# Patient Record
Sex: MALE | Race: WHITE
[De-identification: names, ages, dates, MRNs, and addresses within clinical notes are randomized per-mention and may not be internally consistent; named-entity substitution may affect disease eponyms.]

---

## 2017-08-15 ENCOUNTER — HOSPITAL ENCOUNTER (OUTPATIENT)
Dept: HOSPITAL 83 - MRI | Age: 57
Discharge: HOME | End: 2017-08-15
Attending: INTERNAL MEDICINE
Payer: MEDICARE
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
## 2017-08-15 DIAGNOSIS — S99.911A: Primary | ICD-10-CM
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
## 2018-05-10 ENCOUNTER — HOSPITAL ENCOUNTER (OUTPATIENT)
Dept: HOSPITAL 83 - CT | Age: 58
Discharge: HOME | End: 2018-05-10
Attending: INTERNAL MEDICINE
Payer: MEDICARE

## 2018-05-10 DIAGNOSIS — R10.9: Primary | ICD-10-CM

## 2018-05-13 ENCOUNTER — APPOINTMENT (OUTPATIENT)
Dept: GENERAL RADIOLOGY | Age: 58
End: 2018-05-13
Payer: MEDICARE

## 2018-05-13 ENCOUNTER — HOSPITAL ENCOUNTER (EMERGENCY)
Age: 58
Discharge: HOME | End: 2018-05-13
Attending: EMERGENCY MEDICINE
Payer: MEDICARE

## 2018-05-13 VITALS
BODY MASS INDEX: 29.52 KG/M2 | DIASTOLIC BLOOD PRESSURE: 70 MMHG | WEIGHT: 250 LBS | TEMPERATURE: 98.3 F | HEART RATE: 76 BPM | OXYGEN SATURATION: 96 % | RESPIRATION RATE: 16 BRPM | SYSTOLIC BLOOD PRESSURE: 105 MMHG | HEIGHT: 77 IN

## 2018-05-13 DIAGNOSIS — R52 BODY ACHES: Primary | ICD-10-CM

## 2018-05-13 LAB
ANION GAP SERPL CALCULATED.3IONS-SCNC: 13 MMOL/L (ref 7–16)
BASOPHILS ABSOLUTE: 0.02 E9/L (ref 0–0.2)
BASOPHILS RELATIVE PERCENT: 0.3 % (ref 0–2)
BUN BLDV-MCNC: 10 MG/DL (ref 6–20)
CALCIUM SERPL-MCNC: 9.1 MG/DL (ref 8.6–10.2)
CHLORIDE BLD-SCNC: 106 MMOL/L (ref 98–107)
CO2: 22 MMOL/L (ref 22–29)
CREAT SERPL-MCNC: 0.9 MG/DL (ref 0.7–1.2)
EKG ATRIAL RATE: 72 BPM
EKG P AXIS: 24 DEGREES
EKG P-R INTERVAL: 150 MS
EKG Q-T INTERVAL: 402 MS
EKG QRS DURATION: 82 MS
EKG QTC CALCULATION (BAZETT): 440 MS
EKG R AXIS: 20 DEGREES
EKG T AXIS: 23 DEGREES
EKG VENTRICULAR RATE: 72 BPM
EOSINOPHILS ABSOLUTE: 0.2 E9/L (ref 0.05–0.5)
EOSINOPHILS RELATIVE PERCENT: 2.8 % (ref 0–6)
GFR AFRICAN AMERICAN: >60
GFR NON-AFRICAN AMERICAN: >60 ML/MIN/1.73
GLUCOSE BLD-MCNC: 107 MG/DL (ref 74–109)
HCT VFR BLD CALC: 42.2 % (ref 37–54)
HEMOGLOBIN: 14.2 G/DL (ref 12.5–16.5)
IMMATURE GRANULOCYTES #: 0.02 E9/L
IMMATURE GRANULOCYTES %: 0.3 % (ref 0–5)
INFLUENZA A BY PCR: NOT DETECTED
INFLUENZA B BY PCR: NOT DETECTED
LYMPHOCYTES ABSOLUTE: 2.56 E9/L (ref 1.5–4)
LYMPHOCYTES RELATIVE PERCENT: 35.5 % (ref 20–42)
MCH RBC QN AUTO: 31.1 PG (ref 26–35)
MCHC RBC AUTO-ENTMCNC: 33.6 % (ref 32–34.5)
MCV RBC AUTO: 92.3 FL (ref 80–99.9)
MONOCYTES ABSOLUTE: 0.52 E9/L (ref 0.1–0.95)
MONOCYTES RELATIVE PERCENT: 7.2 % (ref 2–12)
NEUTROPHILS ABSOLUTE: 3.9 E9/L (ref 1.8–7.3)
NEUTROPHILS RELATIVE PERCENT: 53.9 % (ref 43–80)
PDW BLD-RTO: 12.2 FL (ref 11.5–15)
PLATELET # BLD: 184 E9/L (ref 130–450)
PMV BLD AUTO: 10.7 FL (ref 7–12)
POTASSIUM SERPL-SCNC: 4 MMOL/L (ref 3.5–5)
RBC # BLD: 4.57 E12/L (ref 3.8–5.8)
SODIUM BLD-SCNC: 141 MMOL/L (ref 132–146)
TROPONIN: <0.01 NG/ML (ref 0–0.03)
WBC # BLD: 7.2 E9/L (ref 4.5–11.5)

## 2018-05-13 PROCEDURE — 87502 INFLUENZA DNA AMP PROBE: CPT

## 2018-05-13 PROCEDURE — 80048 BASIC METABOLIC PNL TOTAL CA: CPT

## 2018-05-13 PROCEDURE — 85025 COMPLETE CBC W/AUTO DIFF WBC: CPT

## 2018-05-13 PROCEDURE — 6370000000 HC RX 637 (ALT 250 FOR IP): Performed by: EMERGENCY MEDICINE

## 2018-05-13 PROCEDURE — 99285 EMERGENCY DEPT VISIT HI MDM: CPT

## 2018-05-13 PROCEDURE — 84484 ASSAY OF TROPONIN QUANT: CPT

## 2018-05-13 PROCEDURE — 71045 X-RAY EXAM CHEST 1 VIEW: CPT

## 2018-05-13 RX ORDER — HYDROCODONE BITARTRATE AND ACETAMINOPHEN 5; 325 MG/1; MG/1
1 TABLET ORAL DAILY PRN
COMMUNITY
End: 2021-01-11

## 2018-05-13 RX ORDER — PHENOBARBITAL 60 MG/1
60 TABLET ORAL 3 TIMES DAILY
COMMUNITY
End: 2022-06-15 | Stop reason: SDUPTHER

## 2018-05-13 RX ORDER — M-VIT,TX,IRON,MINS/CALC/FOLIC 27MG-0.4MG
1 TABLET ORAL DAILY
COMMUNITY

## 2018-05-13 RX ORDER — CYCLOSPORINE 0.5 MG/ML
1 EMULSION OPHTHALMIC EVERY 12 HOURS
COMMUNITY
End: 2022-10-20

## 2018-05-13 RX ORDER — CYANOCOBALAMIN 1000 UG/ML
1000 INJECTION INTRAMUSCULAR; SUBCUTANEOUS
COMMUNITY

## 2018-05-13 RX ORDER — SIMVASTATIN 10 MG
10 TABLET ORAL NIGHTLY
COMMUNITY
End: 2022-02-10

## 2018-05-13 RX ORDER — LEVETIRACETAM 500 MG/1
1000 TABLET ORAL 2 TIMES DAILY
COMMUNITY
End: 2021-01-11

## 2018-05-13 RX ORDER — MIRTAZAPINE 15 MG/1
15 TABLET, FILM COATED ORAL NIGHTLY
COMMUNITY
End: 2021-01-11

## 2018-05-13 RX ORDER — CLONAZEPAM 0.5 MG/1
0.5 TABLET ORAL 2 TIMES DAILY
COMMUNITY
End: 2022-02-10 | Stop reason: SDUPTHER

## 2018-05-13 RX ORDER — ACETAMINOPHEN 325 MG/1
650 TABLET ORAL ONCE
Status: COMPLETED | OUTPATIENT
Start: 2018-05-13 | End: 2018-05-13

## 2018-05-13 RX ORDER — VITAMIN E 268 MG
400 CAPSULE ORAL DAILY
COMMUNITY

## 2018-05-13 RX ORDER — ERGOCALCIFEROL 1.25 MG/1
50000 CAPSULE ORAL WEEKLY
COMMUNITY

## 2018-05-13 RX ORDER — RIZATRIPTAN BENZOATE 10 MG/1
10 TABLET ORAL
COMMUNITY

## 2018-05-13 RX ORDER — ESOMEPRAZOLE MAGNESIUM 40 MG/1
40 CAPSULE, DELAYED RELEASE ORAL
Status: ON HOLD | COMMUNITY
End: 2020-02-17 | Stop reason: HOSPADM

## 2018-05-13 RX ORDER — TOPIRAMATE 100 MG/1
100 TABLET, FILM COATED ORAL 2 TIMES DAILY
COMMUNITY
End: 2022-05-06

## 2018-05-13 RX ORDER — FLUTICASONE PROPIONATE 50 MCG
1 SPRAY, SUSPENSION (ML) NASAL DAILY
COMMUNITY

## 2018-05-13 RX ADMIN — ACETAMINOPHEN 650 MG: 325 TABLET ORAL at 02:12

## 2018-05-13 ASSESSMENT — ENCOUNTER SYMPTOMS
COUGH: 0
BACK PAIN: 1
SHORTNESS OF BREATH: 0
COLOR CHANGE: 0
SORE THROAT: 0
BLOOD IN STOOL: 0
RHINORRHEA: 0
NAUSEA: 0
CONSTIPATION: 0
DIARRHEA: 0
ABDOMINAL PAIN: 0
VOMITING: 0

## 2018-05-13 ASSESSMENT — PAIN SCALES - GENERAL
PAINLEVEL_OUTOF10: 6
PAINLEVEL_OUTOF10: 6

## 2018-05-13 ASSESSMENT — PAIN DESCRIPTION - LOCATION: LOCATION: GENERALIZED

## 2018-05-13 ASSESSMENT — PAIN DESCRIPTION - PAIN TYPE: TYPE: ACUTE PAIN

## 2018-05-24 ENCOUNTER — HOSPITAL ENCOUNTER (OUTPATIENT)
Dept: HOSPITAL 83 - MRI | Age: 58
Discharge: HOME | End: 2018-05-24
Attending: INTERNAL MEDICINE
Payer: MEDICARE

## 2018-05-24 DIAGNOSIS — G93.89: Primary | ICD-10-CM

## 2018-07-25 ENCOUNTER — HOSPITAL ENCOUNTER (OUTPATIENT)
Dept: HOSPITAL 83 - SDC | Age: 58
Discharge: HOME | End: 2018-07-25
Attending: INTERNAL MEDICINE
Payer: MEDICARE

## 2018-07-25 VITALS — SYSTOLIC BLOOD PRESSURE: 109 MMHG | DIASTOLIC BLOOD PRESSURE: 74 MMHG

## 2018-07-25 VITALS — SYSTOLIC BLOOD PRESSURE: 100 MMHG | DIASTOLIC BLOOD PRESSURE: 70 MMHG

## 2018-07-25 VITALS — DIASTOLIC BLOOD PRESSURE: 50 MMHG

## 2018-07-25 VITALS — BODY MASS INDEX: 29.16 KG/M2 | WEIGHT: 220 LBS | HEIGHT: 72.99 IN

## 2018-07-25 VITALS — DIASTOLIC BLOOD PRESSURE: 44 MMHG

## 2018-07-25 VITALS — DIASTOLIC BLOOD PRESSURE: 54 MMHG | SYSTOLIC BLOOD PRESSURE: 89 MMHG

## 2018-07-25 DIAGNOSIS — Z90.49: ICD-10-CM

## 2018-07-25 DIAGNOSIS — K22.10: ICD-10-CM

## 2018-07-25 DIAGNOSIS — K44.9: ICD-10-CM

## 2018-07-25 DIAGNOSIS — Z88.0: ICD-10-CM

## 2018-07-25 DIAGNOSIS — Z88.8: ICD-10-CM

## 2018-07-25 DIAGNOSIS — Z82.49: ICD-10-CM

## 2018-07-25 DIAGNOSIS — Z87.820: ICD-10-CM

## 2018-07-25 DIAGNOSIS — K29.50: Primary | ICD-10-CM

## 2018-07-25 DIAGNOSIS — K21.9: ICD-10-CM

## 2018-07-25 DIAGNOSIS — F17.210: ICD-10-CM

## 2018-07-25 DIAGNOSIS — Z98.890: ICD-10-CM

## 2018-07-25 DIAGNOSIS — Z80.9: ICD-10-CM

## 2020-02-16 ENCOUNTER — APPOINTMENT (OUTPATIENT)
Dept: GENERAL RADIOLOGY | Age: 60
End: 2020-02-16
Payer: MEDICARE

## 2020-02-16 ENCOUNTER — HOSPITAL ENCOUNTER (OUTPATIENT)
Age: 60
Setting detail: OBSERVATION
Discharge: OTHER INSTITUTION | End: 2020-02-17
Attending: EMERGENCY MEDICINE | Admitting: INTERNAL MEDICINE
Payer: MEDICARE

## 2020-02-16 LAB
ALBUMIN SERPL-MCNC: 4.3 G/DL (ref 3.5–5.2)
ALP BLD-CCNC: 86 U/L (ref 40–129)
ALT SERPL-CCNC: 23 U/L (ref 0–40)
ANION GAP SERPL CALCULATED.3IONS-SCNC: 10 MMOL/L (ref 7–16)
AST SERPL-CCNC: 38 U/L (ref 0–39)
BASOPHILS ABSOLUTE: 0.03 E9/L (ref 0–0.2)
BASOPHILS RELATIVE PERCENT: 0.4 % (ref 0–2)
BILIRUB SERPL-MCNC: <0.2 MG/DL (ref 0–1.2)
BUN BLDV-MCNC: 14 MG/DL (ref 6–20)
CALCIUM SERPL-MCNC: 9.1 MG/DL (ref 8.6–10.2)
CHLORIDE BLD-SCNC: 101 MMOL/L (ref 98–107)
CO2: 24 MMOL/L (ref 22–29)
CREAT SERPL-MCNC: 1 MG/DL (ref 0.7–1.2)
EOSINOPHILS ABSOLUTE: 0.06 E9/L (ref 0.05–0.5)
EOSINOPHILS RELATIVE PERCENT: 0.8 % (ref 0–6)
GFR AFRICAN AMERICAN: >60
GFR NON-AFRICAN AMERICAN: >60 ML/MIN/1.73
GLUCOSE BLD-MCNC: 109 MG/DL (ref 74–99)
HCT VFR BLD CALC: 44.3 % (ref 37–54)
HEMOGLOBIN: 14.7 G/DL (ref 12.5–16.5)
IMMATURE GRANULOCYTES #: 0.03 E9/L
IMMATURE GRANULOCYTES %: 0.4 % (ref 0–5)
INFLUENZA A BY PCR: DETECTED
INFLUENZA B BY PCR: NOT DETECTED
LACTIC ACID, SEPSIS: 0.9 MMOL/L (ref 0.5–1.9)
LYMPHOCYTES ABSOLUTE: 1.2 E9/L (ref 1.5–4)
LYMPHOCYTES RELATIVE PERCENT: 15.6 % (ref 20–42)
MCH RBC QN AUTO: 31.3 PG (ref 26–35)
MCHC RBC AUTO-ENTMCNC: 33.2 % (ref 32–34.5)
MCV RBC AUTO: 94.5 FL (ref 80–99.9)
MONOCYTES ABSOLUTE: 0.82 E9/L (ref 0.1–0.95)
MONOCYTES RELATIVE PERCENT: 10.7 % (ref 2–12)
NEUTROPHILS ABSOLUTE: 5.54 E9/L (ref 1.8–7.3)
NEUTROPHILS RELATIVE PERCENT: 72.1 % (ref 43–80)
PDW BLD-RTO: 12.8 FL (ref 11.5–15)
PLATELET # BLD: 154 E9/L (ref 130–450)
PMV BLD AUTO: 10.5 FL (ref 7–12)
POTASSIUM REFLEX MAGNESIUM: 4.3 MMOL/L (ref 3.5–5)
PRO-BNP: 40 PG/ML (ref 0–125)
RBC # BLD: 4.69 E12/L (ref 3.8–5.8)
SODIUM BLD-SCNC: 135 MMOL/L (ref 132–146)
TOTAL PROTEIN: 6.8 G/DL (ref 6.4–8.3)
TROPONIN: <0.01 NG/ML (ref 0–0.03)
WBC # BLD: 7.7 E9/L (ref 4.5–11.5)

## 2020-02-16 PROCEDURE — 71046 X-RAY EXAM CHEST 2 VIEWS: CPT

## 2020-02-16 PROCEDURE — 87040 BLOOD CULTURE FOR BACTERIA: CPT

## 2020-02-16 PROCEDURE — 84484 ASSAY OF TROPONIN QUANT: CPT

## 2020-02-16 PROCEDURE — 85025 COMPLETE CBC W/AUTO DIFF WBC: CPT

## 2020-02-16 PROCEDURE — 83880 ASSAY OF NATRIURETIC PEPTIDE: CPT

## 2020-02-16 PROCEDURE — 93005 ELECTROCARDIOGRAM TRACING: CPT | Performed by: STUDENT IN AN ORGANIZED HEALTH CARE EDUCATION/TRAINING PROGRAM

## 2020-02-16 PROCEDURE — 36415 COLL VENOUS BLD VENIPUNCTURE: CPT

## 2020-02-16 PROCEDURE — 80053 COMPREHEN METABOLIC PANEL: CPT

## 2020-02-16 PROCEDURE — 83605 ASSAY OF LACTIC ACID: CPT

## 2020-02-16 PROCEDURE — 2580000003 HC RX 258: Performed by: STUDENT IN AN ORGANIZED HEALTH CARE EDUCATION/TRAINING PROGRAM

## 2020-02-16 PROCEDURE — 6370000000 HC RX 637 (ALT 250 FOR IP): Performed by: STUDENT IN AN ORGANIZED HEALTH CARE EDUCATION/TRAINING PROGRAM

## 2020-02-16 PROCEDURE — 99285 EMERGENCY DEPT VISIT HI MDM: CPT

## 2020-02-16 PROCEDURE — 87502 INFLUENZA DNA AMP PROBE: CPT

## 2020-02-16 RX ORDER — ACETAMINOPHEN 500 MG
1000 TABLET ORAL ONCE
Status: COMPLETED | OUTPATIENT
Start: 2020-02-16 | End: 2020-02-16

## 2020-02-16 RX ORDER — OMEPRAZOLE 20 MG/1
20 CAPSULE, DELAYED RELEASE ORAL DAILY
COMMUNITY

## 2020-02-16 RX ORDER — RIVASTIGMINE TARTRATE 6 MG/1
6 CAPSULE ORAL 2 TIMES DAILY
COMMUNITY

## 2020-02-16 RX ORDER — OSELTAMIVIR PHOSPHATE 75 MG/1
75 CAPSULE ORAL ONCE
Status: COMPLETED | OUTPATIENT
Start: 2020-02-16 | End: 2020-02-16

## 2020-02-16 RX ORDER — ONDANSETRON 4 MG/1
4 TABLET, FILM COATED ORAL EVERY 8 HOURS PRN
COMMUNITY

## 2020-02-16 RX ORDER — CALCIUM CARBONATE 200(500)MG
1 TABLET,CHEWABLE ORAL DAILY
COMMUNITY
End: 2022-04-13

## 2020-02-16 RX ORDER — METOCLOPRAMIDE 5 MG/1
5 TABLET ORAL 4 TIMES DAILY
COMMUNITY

## 2020-02-16 RX ORDER — 0.9 % SODIUM CHLORIDE 0.9 %
30 INTRAVENOUS SOLUTION INTRAVENOUS ONCE
Status: COMPLETED | OUTPATIENT
Start: 2020-02-16 | End: 2020-02-17

## 2020-02-16 RX ORDER — MEMANTINE HYDROCHLORIDE 10 MG/1
10 TABLET ORAL 2 TIMES DAILY
COMMUNITY

## 2020-02-16 RX ADMIN — OSELTAMIVIR PHOSPHATE 75 MG: 75 CAPSULE ORAL at 23:21

## 2020-02-16 RX ADMIN — ACETAMINOPHEN 1000 MG: 500 TABLET ORAL at 22:12

## 2020-02-16 RX ADMIN — SODIUM CHLORIDE 3156 ML: 9 INJECTION, SOLUTION INTRAVENOUS at 22:12

## 2020-02-16 ASSESSMENT — PAIN SCALES - GENERAL
PAINLEVEL_OUTOF10: 10
PAINLEVEL_OUTOF10: 0

## 2020-02-16 ASSESSMENT — PAIN DESCRIPTION - LOCATION: LOCATION: RIB CAGE

## 2020-02-16 ASSESSMENT — PAIN DESCRIPTION - DESCRIPTORS: DESCRIPTORS: SORE

## 2020-02-16 ASSESSMENT — PAIN DESCRIPTION - ORIENTATION: ORIENTATION: LEFT;RIGHT

## 2020-02-16 ASSESSMENT — PAIN DESCRIPTION - PAIN TYPE: TYPE: ACUTE PAIN

## 2020-02-17 VITALS
OXYGEN SATURATION: 96 % | HEART RATE: 92 BPM | RESPIRATION RATE: 18 BRPM | BODY MASS INDEX: 27.39 KG/M2 | WEIGHT: 232 LBS | HEIGHT: 77 IN | TEMPERATURE: 100.3 F | DIASTOLIC BLOOD PRESSURE: 63 MMHG | SYSTOLIC BLOOD PRESSURE: 107 MMHG

## 2020-02-17 PROBLEM — J11.1 INFLUENZA: Status: ACTIVE | Noted: 2020-02-17

## 2020-02-17 PROBLEM — J10.1 INFLUENZA A: Status: ACTIVE | Noted: 2020-02-17

## 2020-02-17 LAB
ADENOVIRUS BY PCR: NOT DETECTED
BACTERIA: NORMAL /HPF
BILIRUBIN URINE: NEGATIVE
BLOOD, URINE: NEGATIVE
BORDETELLA PARAPERTUSSIS BY PCR: NOT DETECTED
BORDETELLA PERTUSSIS BY PCR: NOT DETECTED
CHLAMYDOPHILIA PNEUMONIAE BY PCR: NOT DETECTED
CLARITY: CLEAR
COLOR: YELLOW
CORONAVIRUS 229E BY PCR: NOT DETECTED
CORONAVIRUS HKU1 BY PCR: NOT DETECTED
CORONAVIRUS NL63 BY PCR: NOT DETECTED
CORONAVIRUS OC43 BY PCR: NOT DETECTED
EKG ATRIAL RATE: 103 BPM
EKG P AXIS: 33 DEGREES
EKG P-R INTERVAL: 132 MS
EKG Q-T INTERVAL: 318 MS
EKG QRS DURATION: 82 MS
EKG QTC CALCULATION (BAZETT): 416 MS
EKG R AXIS: 54 DEGREES
EKG T AXIS: 39 DEGREES
EKG VENTRICULAR RATE: 103 BPM
GLUCOSE URINE: NEGATIVE MG/DL
HUMAN METAPNEUMOVIRUS BY PCR: NOT DETECTED
HUMAN RHINOVIRUS/ENTEROVIRUS BY PCR: NOT DETECTED
INFLUENZA A H1-2009 BY PCR: DETECTED
INFLUENZA B BY PCR: NOT DETECTED
KETONES, URINE: NEGATIVE MG/DL
LEUKOCYTE ESTERASE, URINE: NEGATIVE
MYCOPLASMA PNEUMONIAE BY PCR: NOT DETECTED
NITRITE, URINE: NEGATIVE
PARAINFLUENZA VIRUS 1 BY PCR: NOT DETECTED
PARAINFLUENZA VIRUS 2 BY PCR: NOT DETECTED
PARAINFLUENZA VIRUS 3 BY PCR: NOT DETECTED
PARAINFLUENZA VIRUS 4 BY PCR: NOT DETECTED
PH UA: 6 (ref 5–9)
PROCALCITONIN: 0.05 NG/ML (ref 0–0.08)
PROTEIN UA: NEGATIVE MG/DL
RBC UA: NORMAL /HPF (ref 0–2)
RESPIRATORY SYNCYTIAL VIRUS BY PCR: NOT DETECTED
SPECIFIC GRAVITY UA: 1.02 (ref 1–1.03)
UROBILINOGEN, URINE: 0.2 E.U./DL
WBC UA: NORMAL /HPF (ref 0–5)

## 2020-02-17 PROCEDURE — 0100U HC RESPIRPTHGN MULT REV TRANS & AMP PRB TECH 21 TRGT: CPT

## 2020-02-17 PROCEDURE — 84145 PROCALCITONIN (PCT): CPT

## 2020-02-17 PROCEDURE — 36415 COLL VENOUS BLD VENIPUNCTURE: CPT

## 2020-02-17 PROCEDURE — G0378 HOSPITAL OBSERVATION PER HR: HCPCS

## 2020-02-17 PROCEDURE — 93010 ELECTROCARDIOGRAM REPORT: CPT | Performed by: INTERNAL MEDICINE

## 2020-02-17 PROCEDURE — 96372 THER/PROPH/DIAG INJ SC/IM: CPT

## 2020-02-17 PROCEDURE — 81001 URINALYSIS AUTO W/SCOPE: CPT

## 2020-02-17 PROCEDURE — 99217 PR OBSERVATION CARE DISCHARGE MANAGEMENT: CPT | Performed by: INTERNAL MEDICINE

## 2020-02-17 PROCEDURE — 87088 URINE BACTERIA CULTURE: CPT

## 2020-02-17 PROCEDURE — 99220 PR INITIAL OBSERVATION CARE/DAY 70 MINUTES: CPT | Performed by: INTERNAL MEDICINE

## 2020-02-17 PROCEDURE — 6370000000 HC RX 637 (ALT 250 FOR IP): Performed by: INTERNAL MEDICINE

## 2020-02-17 PROCEDURE — 6360000002 HC RX W HCPCS: Performed by: INTERNAL MEDICINE

## 2020-02-17 PROCEDURE — 2580000003 HC RX 258: Performed by: INTERNAL MEDICINE

## 2020-02-17 RX ORDER — SODIUM CHLORIDE 0.9 % (FLUSH) 0.9 %
10 SYRINGE (ML) INJECTION PRN
Status: DISCONTINUED | OUTPATIENT
Start: 2020-02-17 | End: 2020-02-17 | Stop reason: HOSPADM

## 2020-02-17 RX ORDER — PHENOBARBITAL 32.4 MG/1
60 TABLET ORAL 3 TIMES DAILY
Status: DISCONTINUED | OUTPATIENT
Start: 2020-02-17 | End: 2020-02-17 | Stop reason: HOSPADM

## 2020-02-17 RX ORDER — RIVASTIGMINE TARTRATE 3 MG/1
6 CAPSULE ORAL 2 TIMES DAILY
Status: DISCONTINUED | OUTPATIENT
Start: 2020-02-17 | End: 2020-02-17 | Stop reason: HOSPADM

## 2020-02-17 RX ORDER — CLONAZEPAM 0.5 MG/1
0.5 TABLET ORAL 2 TIMES DAILY
Status: DISCONTINUED | OUTPATIENT
Start: 2020-02-17 | End: 2020-02-17 | Stop reason: HOSPADM

## 2020-02-17 RX ORDER — LEVETIRACETAM 500 MG/1
1000 TABLET ORAL 2 TIMES DAILY
Status: DISCONTINUED | OUTPATIENT
Start: 2020-02-17 | End: 2020-02-17 | Stop reason: HOSPADM

## 2020-02-17 RX ORDER — MIRTAZAPINE 15 MG/1
15 TABLET, FILM COATED ORAL NIGHTLY
Status: DISCONTINUED | OUTPATIENT
Start: 2020-02-17 | End: 2020-02-17 | Stop reason: HOSPADM

## 2020-02-17 RX ORDER — OSELTAMIVIR PHOSPHATE 75 MG/1
75 CAPSULE ORAL 2 TIMES DAILY
Qty: 10 CAPSULE | Refills: 0 | Status: SHIPPED | OUTPATIENT
Start: 2020-02-17 | End: 2020-02-22

## 2020-02-17 RX ORDER — TOPIRAMATE 100 MG/1
100 TABLET, FILM COATED ORAL 2 TIMES DAILY
Status: DISCONTINUED | OUTPATIENT
Start: 2020-02-17 | End: 2020-02-17 | Stop reason: HOSPADM

## 2020-02-17 RX ORDER — OSELTAMIVIR PHOSPHATE 75 MG/1
75 CAPSULE ORAL 2 TIMES DAILY
Status: DISCONTINUED | OUTPATIENT
Start: 2020-02-17 | End: 2020-02-17 | Stop reason: HOSPADM

## 2020-02-17 RX ORDER — ONDANSETRON 2 MG/ML
4 INJECTION INTRAMUSCULAR; INTRAVENOUS EVERY 6 HOURS PRN
Status: DISCONTINUED | OUTPATIENT
Start: 2020-02-17 | End: 2020-02-17 | Stop reason: HOSPADM

## 2020-02-17 RX ORDER — MEMANTINE HYDROCHLORIDE 10 MG/1
10 TABLET ORAL 2 TIMES DAILY
Status: DISCONTINUED | OUTPATIENT
Start: 2020-02-17 | End: 2020-02-17 | Stop reason: HOSPADM

## 2020-02-17 RX ORDER — ACETAMINOPHEN 325 MG/1
650 TABLET ORAL EVERY 4 HOURS PRN
Status: DISCONTINUED | OUTPATIENT
Start: 2020-02-17 | End: 2020-02-17 | Stop reason: HOSPADM

## 2020-02-17 RX ORDER — SIMVASTATIN 10 MG
10 TABLET ORAL NIGHTLY
Status: DISCONTINUED | OUTPATIENT
Start: 2020-02-17 | End: 2020-02-17 | Stop reason: HOSPADM

## 2020-02-17 RX ORDER — SODIUM CHLORIDE 0.9 % (FLUSH) 0.9 %
10 SYRINGE (ML) INJECTION EVERY 12 HOURS SCHEDULED
Status: DISCONTINUED | OUTPATIENT
Start: 2020-02-17 | End: 2020-02-17 | Stop reason: HOSPADM

## 2020-02-17 RX ORDER — SODIUM CHLORIDE 9 MG/ML
INJECTION, SOLUTION INTRAVENOUS CONTINUOUS
Status: DISCONTINUED | OUTPATIENT
Start: 2020-02-17 | End: 2020-02-17 | Stop reason: HOSPADM

## 2020-02-17 RX ADMIN — TOPIRAMATE 100 MG: 100 TABLET, FILM COATED ORAL at 09:03

## 2020-02-17 RX ADMIN — PHENOBARBITAL 64.8 MG: 32.4 TABLET ORAL at 14:01

## 2020-02-17 RX ADMIN — PHENOBARBITAL 64.8 MG: 32.4 TABLET ORAL at 09:03

## 2020-02-17 RX ADMIN — ACETAMINOPHEN 650 MG: 325 TABLET ORAL at 14:05

## 2020-02-17 RX ADMIN — OSELTAMIVIR PHOSPHATE 75 MG: 75 CAPSULE ORAL at 11:03

## 2020-02-17 RX ADMIN — SODIUM CHLORIDE: 9 INJECTION, SOLUTION INTRAVENOUS at 03:54

## 2020-02-17 RX ADMIN — CLONAZEPAM 0.5 MG: 0.5 TABLET ORAL at 09:03

## 2020-02-17 RX ADMIN — ENOXAPARIN SODIUM 40 MG: 40 INJECTION SUBCUTANEOUS at 09:03

## 2020-02-17 RX ADMIN — LEVETIRACETAM 1000 MG: 500 TABLET ORAL at 09:03

## 2020-02-17 RX ADMIN — MEMANTINE HYDROCHLORIDE 10 MG: 10 TABLET, FILM COATED ORAL at 09:03

## 2020-02-17 RX ADMIN — RIVASTIGMINE TARTRATE 6 MG: 3 CAPSULE ORAL at 09:03

## 2020-02-17 ASSESSMENT — PAIN SCALES - WONG BAKER: WONGBAKER_NUMERICALRESPONSE: 2

## 2020-02-17 ASSESSMENT — PAIN SCALES - GENERAL
PAINLEVEL_OUTOF10: 0
PAINLEVEL_OUTOF10: 0

## 2020-02-17 NOTE — ED PROVIDER NOTES
Consulted with Dr. John Wei, hospitalist, who accepted the patient for admission. [KG]      ED Course User Index  [KG] Antonia Long, DO       --------------------------------------------- PAST HISTORY ---------------------------------------------  Past Medical History:  has a past medical history of Bipolar 1 disorder ([facility] Depression, GERD (gastroesophageal reflux disease), Hydrocephaly ([facility] and [facility] Past Surgical History:  has no past surgical history on file. Social History:  reports that he has been smoking cigarettes. He has been smoking about 0.50 packs per day. He has never used smokeless tobacco. He reports that he does not drink alcohol or use drugs. Family History: family history is not on file. The patients home medications have been reviewed. Allergies: Asa [aspirin];  Augmentin [amoxicillin-pot clavulanate]; and Pcn [penicillins]    -------------------------------------------------- RESULTS -------------------------------------------------    LABS:  Results for orders placed or performed during the hospital encounter of 02/16/20   Rapid influenza A/B antigens   Result Value Ref Range    Influenza A by PCR DETECTED (A) Not Detected    Influenza B by PCR Not Detected Not Detected   CBC auto differential   Result Value Ref Range    WBC 7.7 4.5 - 11.5 E9/L    RBC 4.69 3.80 - 5.80 E12/L    Hemoglobin 14.7 12.5 - 16.5 g/dL    Hematocrit 44.3 37.0 - 54.0 %    MCV 94.5 80.0 - 99.9 fL    MCH 31.3 26.0 - 35.0 pg    MCHC 33.2 32.0 - 34.5 %    RDW 12.8 11.5 - 15.0 fL    Platelets 207 040 - 968 E9/L    MPV 10.5 7.0 - 12.0 fL    Neutrophils % 72.1 43.0 - 80.0 %    Immature Granulocytes % 0.4 0.0 - 5.0 %    Lymphocytes % 15.6 (L) 20.0 - 42.0 %    Monocytes % 10.7 2.0 - 12.0 %    Eosinophils % 0.8 0.0 - 6.0 %    Basophils % 0.4 0.0 - 2.0 %    Neutrophils Absolute 5.54 1.80 - 7.30 E9/L    Immature Granulocytes # 0.03 E9/L    Lymphocytes Absolute 1.20 (L) 1.50 - 4.00 E9/L Monocytes Absolute 0.82 0.10 - 0.95 E9/L    Eosinophils Absolute 0.06 0.05 - 0.50 E9/L    Basophils Absolute 0.03 0.00 - 0.20 E9/L   Comprehensive Metabolic Panel w/ Reflex to MG   Result Value Ref Range    Sodium 135 132 - 146 mmol/L    Potassium reflex Magnesium 4.3 3.5 - 5.0 mmol/L    Chloride 101 98 - 107 mmol/L    CO2 24 22 - 29 mmol/L    Anion Gap 10 7 - 16 mmol/L    Glucose 109 (H) 74 - 99 mg/dL    BUN 14 6 - 20 mg/dL    CREATININE 1.0 0.7 - 1.2 mg/dL    GFR Non-African American >60 >=60 mL/min/1.73    GFR African American >60     Calcium 9.1 8.6 - 10.2 mg/dL    Total Protein 6.8 6.4 - 8.3 g/dL    Alb 4.3 3.5 - 5.2 g/dL    Total Bilirubin <0.2 0.0 - 1.2 mg/dL    Alkaline Phosphatase 86 40 - 129 U/L    ALT 23 0 - 40 U/L    AST 38 0 - 39 U/L   Lactate, Sepsis   Result Value Ref Range    Lactic Acid, Sepsis 0.9 0.5 - 1.9 mmol/L   Brain Natriuretic Peptide   Result Value Ref Range    Pro-BNP 40 0 - 125 pg/mL   Troponin   Result Value Ref Range    Troponin <0.01 0.00 - 0.03 ng/mL   Urinalysis with Microscopic   Result Value Ref Range    Color, UA Yellow Straw/Yellow    Clarity, UA Clear Clear    Glucose, Ur Negative Negative mg/dL    Bilirubin Urine Negative Negative    Ketones, Urine Negative Negative mg/dL    Specific Gravity, UA 1.020 1.005 - 1.030    Blood, Urine Negative Negative    pH, UA 6.0 5.0 - 9.0    Protein, UA Negative Negative mg/dL    Urobilinogen, Urine 0.2 <2.0 E.U./dL    Nitrite, Urine Negative Negative    Leukocyte Esterase, Urine Negative Negative    WBC, UA NONE 0 - 5 /HPF    RBC, UA NONE 0 - 2 /HPF    Bacteria, UA NONE SEEN None Seen /HPF   EKG 12 Lead   Result Value Ref Range    Ventricular Rate 103 BPM    Atrial Rate 103 BPM    P-R Interval 132 ms    QRS Duration 82 ms    Q-T Interval 318 ms    QTc Calculation (Bazett) 416 ms    P Axis 33 degrees    R Axis 54 degrees    T Axis 39 degrees       RADIOLOGY:  XR CHEST STANDARD (2 VW)   Final Result      No evidence of pneumonia or pleural effusion.      ------------------------- NURSING NOTES AND VITALS REVIEWED ---------------------------  Date / Time Roomed:  2/16/2020  9:09 PM  ED Bed Assignment:  8991/2404-J    The nursing notes within the ED encounter and vital signs as below have been reviewed. Patient Vitals for the past 24 hrs:   BP Temp Temp src Pulse Resp SpO2 Height Weight   02/17/20 0800 107/63 99 °F (37.2 °C) Oral 92 18 96 % -- --   02/17/20 0250 129/72 99.3 °F (37.4 °C) Oral 96 18 95 % -- --   02/17/20 0210 (!) 140/72 98.4 °F (36.9 °C) Axillary 92 16 94 % -- --   02/17/20 0131 -- 98.4 °F (36.9 °C) -- -- -- -- -- --   02/16/20 2253 132/77 -- -- 98 16 92 % -- --   02/16/20 2109 -- -- -- -- -- -- -- 232 lb (105.2 kg)   02/16/20 2107 106/74 103 °F (39.4 °C) Oral 113 14 92 % 6' 5\" (1.956 m) 250 lb (113.4 kg)       Oxygen Saturation Interpretation: Normal    ------------------------------------------ PROGRESS NOTES ------------------------------------------  Re-evaluation(s):Patients symptoms show no change  Repeat physical examination is not changed    Counseling:  I have spoken with the patient and discussed todays results, in addition to providing specific details for the plan of care and counseling regarding the diagnosis and prognosis. Their questions are answered at this time and they are agreeable with the plan of admission.    --------------------------------- ADDITIONAL PROVIDER NOTES ---------------------------------  Consultations Spoke with Dr. Kenneth Lopez. Discussed case. They will admit the patient. This patient's ED course included: a personal history and physicial examination, re-evaluation prior to disposition, multiple bedside re-evaluations, IV medications, cardiac monitoring, continuous pulse oximetry and complex medical decision making and emergency management    This patient has remained hemodynamically stable during their ED course. Diagnosis:  1. Influenza    2.  History of intellectual disability Disposition:  Patient's disposition: Admit to telemetry  Patient's condition is stable.          Roxie Triana DO  Resident  02/17/20 3673

## 2020-02-17 NOTE — H&P
Hospitalist Group   History and Physical      CHIEF COMPLAINT: Shortness of breath, cough    History of Present Illness:  64 y.o. male with a history of MRDD, bipolar, seizure disorder presents with shortness breath and cough. Patient lives at the group home and he was brought in by his nurse. No one in the room with the patient. Patient is poor historian given his MRDD. He is nonverbal and oriented to place and time. He does not know when his symptoms started. He is unaware of anyone sick in the group home. Patient said that he currently has rib pain due to coughing. He denies nausea or vomiting. He mentioned he had diarrhea last week. No urinary symptoms. REVIEW OF SYSTEMS:  no fevers, chills, has ribs pain, has sob, no n/v, ha, vision/hearing changes, wt changes, hot/cold flashes, other open skin lesions, had diarrhea last week with not anymore, no constipation, dysuria/hematuria unless noted in HPI. Complete ROS performed with the patient and is otherwise negative. PMH:  Past Medical History:   Diagnosis Date    [facility] ([facility]     Depression     GERD (gastroesophageal reflux disease)     Hydrocephaly (HCC)     [facility]        Surgical History:  History reviewed. No pertinent surgical history. Medications Prior to Admission:    Prior to Admission medications    Medication Sig Start Date End Date Taking?  Authorizing Provider   ondansetron (ZOFRAN) 4 MG tablet Take 4 mg by mouth every 8 hours as needed for Nausea or Vomiting   Yes Historical Provider, MD   calcium carbonate (TUMS) 500 MG chewable tablet Take 1 tablet by mouth daily   Yes Historical Provider, MD   memantine (NAMENDA) 10 MG tablet Take 10 mg by mouth 2 times daily   Yes Historical Provider, MD   metoclopramide (REGLAN) 5 MG tablet Take 5 mg by mouth 4 times daily   Yes Historical Provider, MD   omeprazole (PRILOSEC) 20 MG delayed release capsule Take 40 mg by mouth daily   Yes Historical Provider, MD rivastigmine (EXELON) 6 MG capsule Take 6 mg by mouth 2 times daily   Yes Historical Provider, MD   clonazePAM (KLONOPIN) 0.5 MG tablet Take 0.5 mg by mouth 2 times daily. .   Yes Historical Provider, MD   esomeprazole (NEXIUM) 40 MG delayed release capsule Take 40 mg by mouth every morning (before breakfast)   Yes Historical Provider, MD   fluticasone (FLONASE) 50 MCG/ACT nasal spray 1 spray by Nasal route daily   Yes Historical Provider, MD   levETIRAcetam (KEPPRA) 500 MG tablet Take 1,000 mg by mouth 2 times daily   Yes Historical Provider, MD   mirtazapine (REMERON) 15 MG tablet Take 15 mg by mouth nightly   Yes Historical Provider, MD   Multiple Vitamins-Minerals (THERAPEUTIC MULTIVITAMIN-MINERALS) tablet Take 1 tablet by mouth daily   Yes Historical Provider, MD   PHENobarbital (LUMINAL) 60 MG tablet Take 60 mg by mouth 3 times daily. .   Yes Historical Provider, MD   cycloSPORINE (RESTASIS) 0.05 % ophthalmic emulsion Place 1 drop into both eyes every 12 hours   Yes Historical Provider, MD   simvastatin (ZOCOR) 10 MG tablet Take 10 mg by mouth nightly   Yes Historical Provider, MD   topiramate (TOPAMAX) 100 MG tablet Take 100 mg by mouth 2 times daily   Yes Historical Provider, MD   vitamin D (ERGOCALCIFEROL) 16000 units CAPS capsule Take 50,000 Units by mouth once a week   Yes Historical Provider, MD   vitamin E 400 UNIT capsule Take 400 Units by mouth daily   Yes Historical Provider, MD   HYDROcodone-acetaminophen (NORCO) 5-325 MG per tablet Take 1 tablet by mouth daily as needed for Pain. Aayush Alfonso Historical Provider, MD   rizatriptan (MAXALT) 10 MG tablet Take 10 mg by mouth once as needed for Migraine May repeat in 2 hours if needed    Historical Provider, MD   cyanocobalamin 1000 MCG/ML injection Inject 1,000 mcg into the muscle every 30 days    Historical Provider, MD   Cariprazine HCl (VRAYLAR) 3 MG CAPS Take by mouth nightly    Historical Provider, MD       Allergies:    Tonja Montesinos [aspirin];  Augmentin [amoxicillin-pot clavulanate]; and Pcn [penicillins]    Social History:    reports that he has been smoking cigarettes. He has been smoking about 0.50 packs per day. He has never used smokeless tobacco. He reports that he does not drink alcohol or use drugs. Family History:   Patient is poor historian-unknown family history    PHYSICAL EXAM:  Vitals:  /72   Pulse 96   Temp 99.3 °F (37.4 °C) (Oral)   Resp 18   Ht 6' 5\" (1.956 m)   Wt 232 lb (105.2 kg)   SpO2 95%   BMI 27.51 kg/m²   General Appearance: Slow mentation, alert and oriented to person, place and time,  in no acute distress  Skin: warm and dry, no rash or erythema  Head: normocephalic and atraumatic  Eyes: pupils equal, round, and reactive to light, extraocular eye movements intact, conjunctivae normal  ENT: tympanic membrane, external ear and ear canal normal bilaterally, nose without deformity, nasal mucosa and turbinates normal without polyps  Neck: supple and non-tender without mass, no thyromegaly or thyroid nodules, no cervical lymphadenopathy  Pulmonary/Chest: clear to auscultation bilaterally- no wheezes  Cardiovascular: normal rate, regular rhythm, normal S1 and S2, no murmurs, rubs, clicks, or gallops, distal pulses intact, no carotid bruits  Abdomen: soft, non-tender, non-distended, normal bowel sounds, no masses or organomegaly  Extremities: no cyanosis, clubbing or edema  Musculoskeletal: normal range of motion, no joint swelling, deformity or tenderness  Neurologic: reflexes normal and symmetric, no cranial nerve deficit,coordination and speech normal      LABS:  Recent Labs     02/16/20  2139      K 4.3      CO2 24   BUN 14   CREATININE 1.0   GLUCOSE 109*   CALCIUM 9.1       Recent Labs     02/16/20 2139   WBC 7.7   RBC 4.69   HGB 14.7   HCT 44.3   MCV 94.5   MCH 31.3   MCHC 33.2   RDW 12.8      MPV 10.5       No results for input(s): POCGLU in the last 72 hours.     CBC with Differential:    Lab Results Component Value Date    WBC 7.7 2020    RBC 4.69 2020    HGB 14.7 2020    HCT 44.3 2020     2020    MCV 94.5 2020    MCH 31.3 2020    MCHC 33.2 2020    RDW 12.8 2020    LYMPHOPCT 15.6 2020    MONOPCT 10.7 2020    BASOPCT 0.4 2020    MONOSABS 0.82 2020    LYMPHSABS 1.20 2020    EOSABS 0.06 2020    BASOSABS 0.03 2020     CMP:    Lab Results   Component Value Date     2020    K 4.3 2020     2020    CO2 24 2020    BUN 14 2020    CREATININE 1.0 2020    GFRAA >60 2020    LABGLOM >60 2020    GLUCOSE 109 2020    PROT 6.8 2020    LABALBU 4.3 2020    CALCIUM 9.1 2020    BILITOT <0.2 2020    ALKPHOS 86 2020    AST 38 2020    ALT 23 2020       Radiology: Xr Chest Standard (2 Vw)    Result Date: 2020  Patient MRN:  [id number] : 1960 Age: 64 years Gender: Male Order Date:  2020 9:30 PM EXAM: XR CHEST (2 VW) COMPARISON: May 13, 2018 INDICATION:  chest pain chest pain FINDINGS: There are low lung volumes. No focal airspace opacity or pleural effusion. The heart is normal in size. Left-sided ventriculoperitoneal shunt catheter present. No pneumothorax. No evidence of pneumonia or pleural effusion. EKG: Sinus tachycardia    ASSESSMENT/ PLAN[de-identified]      Active Problems:    Influenza A  Resolved Problems:    * No resolved hospital problems. *      1. Influenza A   Lives at the group home- likely sick contact   Shortness of breath, cough-unknown duration   Influenza A positive on rapid flu test   Will continue Tamiflu   IV hydration  2.  SIRS   Tachycardia, fever on presentation   Diagnosed of influenza A   Chest x-ray negative   Will send procalcitonin to rule out bacterial pneumonia   Continue IV hydration and monitor    Code Status: Full  DVT prophylaxis: Lovenox    Electronically signed by Lisa Bunch,

## 2020-02-17 NOTE — DISCHARGE SUMMARY
94.5   MCH 31.3   MCHC 33.2   RDW 12.8      MPV 10.5       No results for input(s): POCGLU in the last 72 hours. Imaging:  Xr Chest Standard (2 Vw)    Result Date: 2020  Patient MRN:  [id number] : 1960 Age: 64 years Gender: Male Order Date:  2020 9:30 PM EXAM: XR CHEST (2 VW) COMPARISON: May 13, 2018 INDICATION:  chest pain chest pain FINDINGS: There are low lung volumes. No focal airspace opacity or pleural effusion. The heart is normal in size. Left-sided ventriculoperitoneal shunt catheter present. No pneumothorax. No evidence of pneumonia or pleural effusion.       Patient Instructions:      Medication List      START taking these medications    oseltamivir 75 MG capsule  Commonly known as:  TAMIFLU  Take 1 capsule by mouth 2 times daily for 5 days        CONTINUE taking these medications    calcium carbonate 500 MG chewable tablet  Commonly known as:  TUMS     clonazePAM 0.5 MG tablet  Commonly known as:  KLONOPIN     cyanocobalamin 1000 MCG/ML injection     fluticasone 50 MCG/ACT nasal spray  Commonly known as:  FLONASE     HYDROcodone-acetaminophen 5-325 MG per tablet  Commonly known as:  NORCO     Keppra 500 MG tablet  Generic drug:  levETIRAcetam     Maxalt 10 MG tablet  Generic drug:  rizatriptan     memantine 10 MG tablet  Commonly known as:  NAMENDA     metoclopramide 5 MG tablet  Commonly known as:  REGLAN     mirtazapine 15 MG tablet  Commonly known as:  REMERON     omeprazole 20 MG delayed release capsule  Commonly known as:  PRILOSEC     PHENobarbital 60 MG tablet  Commonly known as:  LUMINAL     Restasis 0.05 % ophthalmic emulsion  Generic drug:  cycloSPORINE     rivastigmine 6 MG capsule  Commonly known as:  EXELON     simvastatin 10 MG tablet  Commonly known as:  ZOCOR     therapeutic multivitamin-minerals tablet     topiramate 100 MG tablet  Commonly known as:  TOPAMAX     vitamin D 1.25 MG (81369 UT) Caps capsule  Commonly known as:  ERGOCALCIFEROL     vitamin E 400

## 2020-02-17 NOTE — PROGRESS NOTES
Spoke to Jose Gutiérrez at [facility] to let her know that the patient is being discharged. She stated that she will send someone in to evaluate the patient to see if he is ready to return to [facility]. I let her know that the Dr evaluated the patient and feels he is ready to return. Someone from [facility] will be in to see the patient.

## 2020-02-17 NOTE — PROGRESS NOTES
[facility]/Tele  [address]  [facility] [address]  Phone: [phone]             February 17, 2020    Patient: Victor Hugo Colon   YOB: 1960   Date of Visit: 2/16/2020       To Whom It May Concern:    Victor Hugo Colon was seen and treated in our facility  beginning 2/16/2020 until 2/17/2020. He may return to work on 2/24/2020 (1 week after discharge).       Sincerely,       Mauro Richards RN         Signature:__________________________________

## 2020-02-17 NOTE — DISCHARGE INSTR - COC
Independent  Transfer  Independent  Bathing  Independent  Dressing  Independent  Toileting  Assisted  Feeding  Independent  Med [address]  Assisted  Med Delivery   whole    Wound Care Documentation and Therapy:        Elimination:  Continence:   · Bowel: Yes  · Bladder: Yes  Urinary Catheter: None   Colostomy/Ileostomy/Ileal Conduit: No       Date of Last BM: 02/17/2020  No intake or output data in the 24 hours ending 02/17/20 1625  No intake/output data recorded. Safety Concerns: At Risk for Falls    Impairments/Disabilities:      None    Nutrition Therapy:  Current Nutrition Therapy:   - Oral Diet:  General    Routes of Feeding: Oral  Liquids: Thin Liquids  Daily Fluid Restriction: no  Last Modified Barium Swallow with Video (Video Swallowing Test): not done    Treatments at the Time of Hospital Discharge:   Respiratory Treatments: ***  Oxygen Therapy:  is not on home oxygen therapy.   Ventilator:    - No ventilator support    Rehab Therapies: Physical Therapy and Occupational Therapy  Weight Bearing Status/Restrictions: No weight bearing restirctions  Other Medical Equipment (for information only, NOT a DME order):  {EQUIPMENT:[id number]}  Other Treatments: ***    Patient's personal belongings (please select all that are sent with patient):  None    RN SIGNATURE:  {Esignature:[id number]} Cecilia Martinez RN     CASE MANAGEMENT/SOCIAL WORK SECTION    Inpatient Status Date: ***    Readmission Risk Assessment Score:  Readmission Risk              Risk of Unplanned Readmission:        12           Discharging to Facility/ Agency   · Name:   · Address:  · Phone:  · Fax:    Dialysis Facility (if applicable)   · Name:  · Address:  · Dialysis Schedule:  · Phone:  · Fax:    / signature: {Esignature:[id number]}    PHYSICIAN SECTION    Prognosis: {Prognosis:[id number]}    Condition at Discharge: [address] [place] [address] Patient Condition:[id number]}    Rehab Potential (if transferring to Rehab):

## 2020-02-19 LAB — URINE CULTURE, ROUTINE: NORMAL

## 2020-02-22 LAB
BLOOD CULTURE, ROUTINE: NORMAL
CULTURE, BLOOD 2: NORMAL

## 2020-08-06 ENCOUNTER — HOSPITAL ENCOUNTER (OUTPATIENT)
Dept: HOSPITAL 83 - LAB | Age: 60
Discharge: HOME | End: 2020-08-06
Attending: INTERNAL MEDICINE
Payer: MEDICARE

## 2020-08-06 DIAGNOSIS — R76.0: ICD-10-CM

## 2020-08-06 DIAGNOSIS — M06.4: ICD-10-CM

## 2020-08-06 DIAGNOSIS — Z12.5: Primary | ICD-10-CM

## 2020-08-06 DIAGNOSIS — E78.5: ICD-10-CM

## 2020-08-06 LAB
ALBUMIN SERPL-MCNC: 3.9 GM/DL (ref 3.1–4.5)
ALP SERPL-CCNC: 119 U/L (ref 45–117)
ALT SERPL W P-5'-P-CCNC: 27 U/L (ref 12–78)
AST SERPL-CCNC: 15 IU/L (ref 3–35)
BASOPHILS # BLD AUTO: 0 10*3/UL (ref 0–0.1)
BASOPHILS NFR BLD AUTO: 0.1 % (ref 0–1)
BUN SERPL-MCNC: 12 MG/DL (ref 7–24)
CHLORIDE SERPL-SCNC: 111 MMOL/L (ref 98–107)
CHOLEST SERPL-MCNC: 208 MG/DL (ref ?–200)
CREAT SERPL-MCNC: 0.96 MG/DL (ref 0.7–1.3)
EOSINOPHIL # BLD AUTO: 0.2 10*3/UL (ref 0–0.4)
EOSINOPHIL # BLD AUTO: 2.7 % (ref 1–4)
ERYTHROCYTE [DISTWIDTH] IN BLOOD BY AUTOMATED COUNT: 12 % (ref 0–14.5)
HCT VFR BLD AUTO: 45.8 % (ref 42–52)
HDLC SERPL-MCNC: 52 MG/DL (ref 40–60)
LDLC SERPL DIRECT ASSAY-MCNC: 113 MG/DL (ref 9–159)
LYMPHOCYTES # BLD AUTO: 2.6 10*3/UL (ref 1.3–4.4)
LYMPHOCYTES NFR BLD AUTO: 36.9 % (ref 27–41)
MCH RBC QN AUTO: 31.4 PG (ref 27–31)
MCHC RBC AUTO-ENTMCNC: 32.8 G/DL (ref 33–37)
MCV RBC AUTO: 95.8 FL (ref 80–94)
MONOCYTES # BLD AUTO: 0.5 10*3/UL (ref 0.1–1)
MONOCYTES NFR BLD MANUAL: 7.6 % (ref 3–9)
NEUT #: 3.7 10*3/UL (ref 2.3–7.9)
NEUT %: 52.6 % (ref 47–73)
NRBC BLD QL AUTO: 0 10*3/UL (ref 0–0)
PLATELET # BLD AUTO: 218 10*3/UL (ref 130–400)
PMV BLD AUTO: 10.9 FL (ref 9.6–12.3)
POTASSIUM SERPL-SCNC: 4.2 MMOL/L (ref 3.5–5.1)
PROT SERPL-MCNC: 7.6 GM/DL (ref 6.4–8.2)
RBC # BLD AUTO: 4.78 10*6/UL (ref 4.5–5.9)
SODIUM SERPL-SCNC: 140 MMOL/L (ref 136–145)
TRIGL SERPL-MCNC: 213 MG/DL (ref ?–150)
VLDLC SERPL CALC-MCNC: 43 MG/DL (ref 6–40)
WBC NRBC COR # BLD AUTO: 7 10*3/UL (ref 4.8–10.8)

## 2020-08-07 LAB
DSDNA AB SER-ACNC: 4 IU/ML (ref 0–9)
RHEUMATOID FACT SERPL-ACNC: <10 IU/ML (ref 0–13.9)

## 2020-08-08 LAB — CCP IGA+IGG SERPL IA-ACNC: 6 UNITS (ref 0–19)

## 2020-10-16 ENCOUNTER — HOSPITAL ENCOUNTER (OUTPATIENT)
Dept: HOSPITAL 83 - LAB | Age: 60
Discharge: HOME | End: 2020-10-16
Attending: INTERNAL MEDICINE
Payer: MEDICARE

## 2020-10-16 DIAGNOSIS — K76.0: Primary | ICD-10-CM

## 2020-10-16 DIAGNOSIS — K44.9: ICD-10-CM

## 2020-10-16 LAB — CREAT SERPL-MCNC: 1.03 MG/DL (ref 0.7–1.3)

## 2020-10-22 ENCOUNTER — HOSPITAL ENCOUNTER (OUTPATIENT)
Age: 60
Discharge: HOME | End: 2020-10-24
Payer: MEDICARE

## 2020-10-22 LAB — HBA1C MFR BLD: 5.5 % (ref 4–5.6)

## 2020-10-22 PROCEDURE — 83036 HEMOGLOBIN GLYCOSYLATED A1C: CPT

## 2020-10-22 PROCEDURE — 36415 COLL VENOUS BLD VENIPUNCTURE: CPT

## 2021-01-11 ENCOUNTER — OFFICE VISIT (OUTPATIENT)
Dept: PRIMARY CARE CLINIC | Age: 61
End: 2021-01-11
Payer: MEDICARE

## 2021-01-11 ENCOUNTER — APPOINTMENT (OUTPATIENT)
Dept: GENERAL RADIOLOGY | Age: 61
DRG: 871 | End: 2021-01-11
Payer: MEDICARE

## 2021-01-11 ENCOUNTER — APPOINTMENT (OUTPATIENT)
Dept: CT IMAGING | Age: 61
DRG: 871 | End: 2021-01-11
Payer: MEDICARE

## 2021-01-11 ENCOUNTER — HOSPITAL ENCOUNTER (INPATIENT)
Age: 61
LOS: 4 days | Discharge: HOME | DRG: 871 | End: 2021-01-15
Attending: EMERGENCY MEDICINE | Admitting: INTERNAL MEDICINE
Payer: MEDICARE

## 2021-01-11 VITALS
BODY MASS INDEX: 27.39 KG/M2 | OXYGEN SATURATION: 88 % | RESPIRATION RATE: 20 BRPM | HEIGHT: 77 IN | HEART RATE: 122 BPM | WEIGHT: 232 LBS | TEMPERATURE: 98.2 F

## 2021-01-11 DIAGNOSIS — Z20.822 SUSPECTED COVID-19 VIRUS INFECTION: ICD-10-CM

## 2021-01-11 DIAGNOSIS — J12.82 PNEUMONIA DUE TO COVID-19 VIRUS: Primary | ICD-10-CM

## 2021-01-11 DIAGNOSIS — J96.01 ACUTE RESPIRATORY FAILURE WITH HYPOXIA (HCC): ICD-10-CM

## 2021-01-11 DIAGNOSIS — U07.1 PNEUMONIA DUE TO COVID-19 VIRUS: Primary | ICD-10-CM

## 2021-01-11 DIAGNOSIS — J40 BRONCHITIS: Primary | ICD-10-CM

## 2021-01-11 DIAGNOSIS — R09.02 HYPOXIA: ICD-10-CM

## 2021-01-11 PROBLEM — J11.1 INFLUENZA: Status: RESOLVED | Noted: 2020-02-17 | Resolved: 2021-01-11

## 2021-01-11 LAB
ADENOVIRUS BY PCR: NOT DETECTED
ANION GAP SERPL CALCULATED.3IONS-SCNC: 11 MMOL/L (ref 7–16)
B.E.: -2.2 MMOL/L (ref -3–0)
BASOPHILS ABSOLUTE: 0.01 E9/L (ref 0–0.2)
BASOPHILS RELATIVE PERCENT: 0.1 % (ref 0–2)
BORDETELLA PARAPERTUSSIS BY PCR: NOT DETECTED
BORDETELLA PERTUSSIS BY PCR: NOT DETECTED
BUN BLDV-MCNC: 17 MG/DL (ref 6–20)
CALCIUM SERPL-MCNC: 8.3 MG/DL (ref 8.6–10.2)
CHLAMYDOPHILIA PNEUMONIAE BY PCR: NOT DETECTED
CHLORIDE BLD-SCNC: 103 MMOL/L (ref 98–107)
CO2: 23 MMOL/L (ref 22–29)
CORONAVIRUS 229E BY PCR: NOT DETECTED
CORONAVIRUS HKU1 BY PCR: NOT DETECTED
CORONAVIRUS NL63 BY PCR: NOT DETECTED
CORONAVIRUS OC43 BY PCR: NOT DETECTED
CREAT SERPL-MCNC: 1.2 MG/DL (ref 0.7–1.2)
D DIMER: 731 NG/ML DDU
DELIVERY SYSTEMS: ABNORMAL
DEVICE: ABNORMAL
EOSINOPHILS ABSOLUTE: 0.01 E9/L (ref 0.05–0.5)
EOSINOPHILS RELATIVE PERCENT: 0.1 % (ref 0–6)
GFR AFRICAN AMERICAN: >60
GFR NON-AFRICAN AMERICAN: >60 ML/MIN/1.73
GLUCOSE BLD-MCNC: 116 MG/DL (ref 74–99)
HCO3 ARTERIAL: 21.5 MMOL/L (ref 22–26)
HCT VFR BLD CALC: 45.3 % (ref 37–54)
HEMOGLOBIN: 15.6 G/DL (ref 12.5–16.5)
HUMAN METAPNEUMOVIRUS BY PCR: NOT DETECTED
HUMAN RHINOVIRUS/ENTEROVIRUS BY PCR: NOT DETECTED
IMMATURE GRANULOCYTES #: 0.07 E9/L
IMMATURE GRANULOCYTES %: 0.7 % (ref 0–5)
INFLUENZA A BY PCR: NOT DETECTED
INFLUENZA B BY PCR: NOT DETECTED
LACTIC ACID: 1.3 MMOL/L (ref 0.5–2.2)
LYMPHOCYTES ABSOLUTE: 1.08 E9/L (ref 1.5–4)
LYMPHOCYTES RELATIVE PERCENT: 10.1 % (ref 20–42)
Lab: NORMAL
MCH RBC QN AUTO: 31.1 PG (ref 26–35)
MCHC RBC AUTO-ENTMCNC: 34.4 % (ref 32–34.5)
MCV RBC AUTO: 90.2 FL (ref 80–99.9)
MONOCYTES ABSOLUTE: 0.48 E9/L (ref 0.1–0.95)
MONOCYTES RELATIVE PERCENT: 4.5 % (ref 2–12)
MYCOPLASMA PNEUMONIAE BY PCR: NOT DETECTED
NEUTROPHILS ABSOLUTE: 9.07 E9/L (ref 1.8–7.3)
NEUTROPHILS RELATIVE PERCENT: 84.5 % (ref 43–80)
O2 SATURATION: 79.8 % (ref 92–98.5)
OPERATOR ID: 1210
PARAINFLUENZA VIRUS 1 BY PCR: NOT DETECTED
PARAINFLUENZA VIRUS 2 BY PCR: NOT DETECTED
PARAINFLUENZA VIRUS 3 BY PCR: NOT DETECTED
PARAINFLUENZA VIRUS 4 BY PCR: NOT DETECTED
PCO2 ARTERIAL: 33.7 MMHG (ref 35–45)
PDW BLD-RTO: 12.3 FL (ref 11.5–15)
PH BLOOD GAS: 7.41 (ref 7.35–7.45)
PLATELET # BLD: 209 E9/L (ref 130–450)
PMV BLD AUTO: 10.6 FL (ref 7–12)
PO2 ARTERIAL: 43.1 MMHG (ref 80–100)
POTASSIUM SERPL-SCNC: 3.6 MMOL/L (ref 3.5–5)
PROCALCITONIN: 0.23 NG/ML (ref 0–0.08)
QC PASS/FAIL: NORMAL
RBC # BLD: 5.02 E12/L (ref 3.8–5.8)
RESPIRATORY SYNCYTIAL VIRUS BY PCR: NOT DETECTED
SARS-COV-2, PCR: DETECTED
SARS-COV-2, POC: NORMAL
SODIUM BLD-SCNC: 137 MMOL/L (ref 132–146)
SOURCE, BLOOD GAS: ABNORMAL
WBC # BLD: 10.7 E9/L (ref 4.5–11.5)

## 2021-01-11 PROCEDURE — 2580000003 HC RX 258: Performed by: INTERNAL MEDICINE

## 2021-01-11 PROCEDURE — 6360000002 HC RX W HCPCS: Performed by: STUDENT IN AN ORGANIZED HEALTH CARE EDUCATION/TRAINING PROGRAM

## 2021-01-11 PROCEDURE — 82803 BLOOD GASES ANY COMBINATION: CPT

## 2021-01-11 PROCEDURE — 96374 THER/PROPH/DIAG INJ IV PUSH: CPT

## 2021-01-11 PROCEDURE — 6360000002 HC RX W HCPCS: Performed by: NURSE PRACTITIONER

## 2021-01-11 PROCEDURE — 87040 BLOOD CULTURE FOR BACTERIA: CPT

## 2021-01-11 PROCEDURE — 93005 ELECTROCARDIOGRAM TRACING: CPT | Performed by: EMERGENCY MEDICINE

## 2021-01-11 PROCEDURE — 71275 CT ANGIOGRAPHY CHEST: CPT

## 2021-01-11 PROCEDURE — 3017F COLORECTAL CA SCREEN DOC REV: CPT | Performed by: FAMILY MEDICINE

## 2021-01-11 PROCEDURE — 2580000003 HC RX 258: Performed by: EMERGENCY MEDICINE

## 2021-01-11 PROCEDURE — 6370000000 HC RX 637 (ALT 250 FOR IP): Performed by: INTERNAL MEDICINE

## 2021-01-11 PROCEDURE — 87426 SARSCOV CORONAVIRUS AG IA: CPT | Performed by: FAMILY MEDICINE

## 2021-01-11 PROCEDURE — G8428 CUR MEDS NOT DOCUMENT: HCPCS | Performed by: FAMILY MEDICINE

## 2021-01-11 PROCEDURE — 84145 PROCALCITONIN (PCT): CPT

## 2021-01-11 PROCEDURE — 4004F PT TOBACCO SCREEN RCVD TLK: CPT | Performed by: FAMILY MEDICINE

## 2021-01-11 PROCEDURE — 6360000002 HC RX W HCPCS: Performed by: INTERNAL MEDICINE

## 2021-01-11 PROCEDURE — 2580000003 HC RX 258: Performed by: RADIOLOGY

## 2021-01-11 PROCEDURE — 2500000003 HC RX 250 WO HCPCS: Performed by: INTERNAL MEDICINE

## 2021-01-11 PROCEDURE — 85378 FIBRIN DEGRADE SEMIQUANT: CPT

## 2021-01-11 PROCEDURE — G8419 CALC BMI OUT NRM PARAM NOF/U: HCPCS | Performed by: FAMILY MEDICINE

## 2021-01-11 PROCEDURE — 6370000000 HC RX 637 (ALT 250 FOR IP): Performed by: EMERGENCY MEDICINE

## 2021-01-11 PROCEDURE — 6360000004 HC RX CONTRAST MEDICATION: Performed by: RADIOLOGY

## 2021-01-11 PROCEDURE — 85025 COMPLETE CBC W/AUTO DIFF WBC: CPT

## 2021-01-11 PROCEDURE — 0202U NFCT DS 22 TRGT SARS-COV-2: CPT

## 2021-01-11 PROCEDURE — 99214 OFFICE O/P EST MOD 30 MIN: CPT | Performed by: FAMILY MEDICINE

## 2021-01-11 PROCEDURE — 80048 BASIC METABOLIC PNL TOTAL CA: CPT

## 2021-01-11 PROCEDURE — 99285 EMERGENCY DEPT VISIT HI MDM: CPT

## 2021-01-11 PROCEDURE — G8484 FLU IMMUNIZE NO ADMIN: HCPCS | Performed by: FAMILY MEDICINE

## 2021-01-11 PROCEDURE — 83605 ASSAY OF LACTIC ACID: CPT

## 2021-01-11 PROCEDURE — 2060000000 HC ICU INTERMEDIATE R&B

## 2021-01-11 PROCEDURE — 71045 X-RAY EXAM CHEST 1 VIEW: CPT

## 2021-01-11 RX ORDER — ONDANSETRON 2 MG/ML
4 INJECTION INTRAMUSCULAR; INTRAVENOUS EVERY 6 HOURS PRN
Status: DISCONTINUED | OUTPATIENT
Start: 2021-01-11 | End: 2021-01-15 | Stop reason: HOSPADM

## 2021-01-11 RX ORDER — 0.9 % SODIUM CHLORIDE 0.9 %
30 INTRAVENOUS SOLUTION INTRAVENOUS PRN
Status: DISCONTINUED | OUTPATIENT
Start: 2021-01-11 | End: 2021-01-15 | Stop reason: HOSPADM

## 2021-01-11 RX ORDER — DEXAMETHASONE SODIUM PHOSPHATE 4 MG/ML
6 INJECTION, SOLUTION INTRA-ARTICULAR; INTRALESIONAL; INTRAMUSCULAR; INTRAVENOUS; SOFT TISSUE DAILY
Status: DISCONTINUED | OUTPATIENT
Start: 2021-01-12 | End: 2021-01-15 | Stop reason: HOSPADM

## 2021-01-11 RX ORDER — PHENOBARBITAL 32.4 MG/1
64.8 TABLET ORAL 3 TIMES DAILY
Status: DISCONTINUED | OUTPATIENT
Start: 2021-01-11 | End: 2021-01-15 | Stop reason: HOSPADM

## 2021-01-11 RX ORDER — VITAMIN B COMPLEX
2000 TABLET ORAL DAILY
Status: DISCONTINUED | OUTPATIENT
Start: 2021-01-12 | End: 2021-01-15 | Stop reason: HOSPADM

## 2021-01-11 RX ORDER — CYCLOSPORINE 0.5 MG/ML
1 EMULSION OPHTHALMIC EVERY 12 HOURS
Status: DISCONTINUED | OUTPATIENT
Start: 2021-01-11 | End: 2021-01-11 | Stop reason: CLARIF

## 2021-01-11 RX ORDER — ATORVASTATIN CALCIUM 10 MG/1
10 TABLET, FILM COATED ORAL NIGHTLY
Status: DISCONTINUED | OUTPATIENT
Start: 2021-01-11 | End: 2021-01-15 | Stop reason: HOSPADM

## 2021-01-11 RX ORDER — SODIUM CHLORIDE 0.9 % (FLUSH) 0.9 %
10 SYRINGE (ML) INJECTION PRN
Status: DISCONTINUED | OUTPATIENT
Start: 2021-01-11 | End: 2021-01-15 | Stop reason: HOSPADM

## 2021-01-11 RX ORDER — LANOLIN ALCOHOL/MO/W.PET/CERES
100 CREAM (GRAM) TOPICAL DAILY
Status: DISCONTINUED | OUTPATIENT
Start: 2021-01-12 | End: 2021-01-15 | Stop reason: HOSPADM

## 2021-01-11 RX ORDER — DEXAMETHASONE SODIUM PHOSPHATE 10 MG/ML
6 INJECTION, SOLUTION INTRAMUSCULAR; INTRAVENOUS ONCE
Status: COMPLETED | OUTPATIENT
Start: 2021-01-11 | End: 2021-01-11

## 2021-01-11 RX ORDER — 0.9 % SODIUM CHLORIDE 0.9 %
1000 INTRAVENOUS SOLUTION INTRAVENOUS ONCE
Status: COMPLETED | OUTPATIENT
Start: 2021-01-11 | End: 2021-01-11

## 2021-01-11 RX ORDER — TOPIRAMATE 100 MG/1
100 TABLET, FILM COATED ORAL 2 TIMES DAILY
Status: DISCONTINUED | OUTPATIENT
Start: 2021-01-11 | End: 2021-01-15 | Stop reason: HOSPADM

## 2021-01-11 RX ORDER — FAMOTIDINE 20 MG/1
20 TABLET, FILM COATED ORAL 2 TIMES DAILY
Status: DISCONTINUED | OUTPATIENT
Start: 2021-01-11 | End: 2021-01-15 | Stop reason: HOSPADM

## 2021-01-11 RX ORDER — SODIUM CHLORIDE 0.9 % (FLUSH) 0.9 %
SYRINGE (ML) INJECTION
Status: DISPENSED
Start: 2021-01-11 | End: 2021-01-12

## 2021-01-11 RX ORDER — ACETAMINOPHEN 650 MG/1
650 SUPPOSITORY RECTAL EVERY 6 HOURS PRN
Status: DISCONTINUED | OUTPATIENT
Start: 2021-01-11 | End: 2021-01-15 | Stop reason: HOSPADM

## 2021-01-11 RX ORDER — POLYVINYL ALCOHOL 14 MG/ML
1 SOLUTION/ DROPS OPHTHALMIC PRN
Status: DISCONTINUED | OUTPATIENT
Start: 2021-01-11 | End: 2021-01-15 | Stop reason: HOSPADM

## 2021-01-11 RX ORDER — POTASSIUM CHLORIDE 20 MEQ/1
40 TABLET, EXTENDED RELEASE ORAL PRN
Status: DISCONTINUED | OUTPATIENT
Start: 2021-01-11 | End: 2021-01-15 | Stop reason: HOSPADM

## 2021-01-11 RX ORDER — ACETAMINOPHEN 325 MG/1
TABLET ORAL
COMMUNITY

## 2021-01-11 RX ORDER — ACETAMINOPHEN 325 MG/1
650 TABLET ORAL EVERY 6 HOURS PRN
Status: DISCONTINUED | OUTPATIENT
Start: 2021-01-11 | End: 2021-01-15 | Stop reason: HOSPADM

## 2021-01-11 RX ORDER — ALBUTEROL SULFATE 90 UG/1
2 AEROSOL, METERED RESPIRATORY (INHALATION) EVERY 6 HOURS PRN
Status: DISCONTINUED | OUTPATIENT
Start: 2021-01-11 | End: 2021-01-15 | Stop reason: HOSPADM

## 2021-01-11 RX ORDER — CLONAZEPAM 0.5 MG/1
0.5 TABLET ORAL 2 TIMES DAILY
Status: DISCONTINUED | OUTPATIENT
Start: 2021-01-11 | End: 2021-01-15 | Stop reason: HOSPADM

## 2021-01-11 RX ORDER — SENNA PLUS 8.6 MG/1
1 TABLET ORAL DAILY PRN
Status: DISCONTINUED | OUTPATIENT
Start: 2021-01-11 | End: 2021-01-15 | Stop reason: HOSPADM

## 2021-01-11 RX ORDER — ACETAMINOPHEN 325 MG/1
650 TABLET ORAL ONCE
Status: COMPLETED | OUTPATIENT
Start: 2021-01-11 | End: 2021-01-11

## 2021-01-11 RX ORDER — MEMANTINE HYDROCHLORIDE 10 MG/1
10 TABLET ORAL 2 TIMES DAILY
Status: DISCONTINUED | OUTPATIENT
Start: 2021-01-11 | End: 2021-01-15 | Stop reason: HOSPADM

## 2021-01-11 RX ORDER — METOCLOPRAMIDE 10 MG/1
5 TABLET ORAL 4 TIMES DAILY
Status: DISCONTINUED | OUTPATIENT
Start: 2021-01-11 | End: 2021-01-13 | Stop reason: SDUPTHER

## 2021-01-11 RX ORDER — METHYLPREDNISOLONE SODIUM SUCCINATE 125 MG/2ML
80 INJECTION, POWDER, LYOPHILIZED, FOR SOLUTION INTRAMUSCULAR; INTRAVENOUS ONCE
Status: COMPLETED | OUTPATIENT
Start: 2021-01-11 | End: 2021-01-11

## 2021-01-11 RX ORDER — SODIUM CHLORIDE 0.9 % (FLUSH) 0.9 %
10 SYRINGE (ML) INJECTION PRN
Status: COMPLETED | OUTPATIENT
Start: 2021-01-11 | End: 2021-01-11

## 2021-01-11 RX ORDER — SIMVASTATIN 10 MG
10 TABLET ORAL NIGHTLY
Status: DISCONTINUED | OUTPATIENT
Start: 2021-01-11 | End: 2021-01-11 | Stop reason: CLARIF

## 2021-01-11 RX ORDER — ZINC SULFATE 50(220)MG
50 CAPSULE ORAL DAILY
Status: DISCONTINUED | OUTPATIENT
Start: 2021-01-12 | End: 2021-01-15 | Stop reason: HOSPADM

## 2021-01-11 RX ORDER — SODIUM CHLORIDE 0.9 % (FLUSH) 0.9 %
10 SYRINGE (ML) INJECTION EVERY 12 HOURS SCHEDULED
Status: DISCONTINUED | OUTPATIENT
Start: 2021-01-11 | End: 2021-01-15 | Stop reason: HOSPADM

## 2021-01-11 RX ORDER — POTASSIUM CHLORIDE 7.45 MG/ML
10 INJECTION INTRAVENOUS PRN
Status: DISCONTINUED | OUTPATIENT
Start: 2021-01-11 | End: 2021-01-15 | Stop reason: HOSPADM

## 2021-01-11 RX ORDER — RIVASTIGMINE TARTRATE 3 MG/1
6 CAPSULE ORAL 2 TIMES DAILY
Status: DISCONTINUED | OUTPATIENT
Start: 2021-01-11 | End: 2021-01-15 | Stop reason: HOSPADM

## 2021-01-11 RX ORDER — PROMETHAZINE HYDROCHLORIDE 25 MG/1
12.5 TABLET ORAL EVERY 6 HOURS PRN
Status: DISCONTINUED | OUTPATIENT
Start: 2021-01-11 | End: 2021-01-15 | Stop reason: HOSPADM

## 2021-01-11 RX ORDER — BENZONATATE 100 MG/1
100 CAPSULE ORAL 3 TIMES DAILY PRN
Status: DISCONTINUED | OUTPATIENT
Start: 2021-01-11 | End: 2021-01-15 | Stop reason: HOSPADM

## 2021-01-11 RX ADMIN — METOCLOPRAMIDE 5 MG: 10 TABLET ORAL at 23:59

## 2021-01-11 RX ADMIN — Medication 10 ML: at 20:19

## 2021-01-11 RX ADMIN — DEXAMETHASONE SODIUM PHOSPHATE 6 MG: 10 INJECTION, SOLUTION INTRAMUSCULAR; INTRAVENOUS at 22:02

## 2021-01-11 RX ADMIN — MEMANTINE HYDROCHLORIDE 10 MG: 10 TABLET, FILM COATED ORAL at 23:59

## 2021-01-11 RX ADMIN — ATORVASTATIN CALCIUM 10 MG: 10 TABLET, FILM COATED ORAL at 23:59

## 2021-01-11 RX ADMIN — ENOXAPARIN SODIUM 40 MG: 40 INJECTION SUBCUTANEOUS at 23:58

## 2021-01-11 RX ADMIN — CLONAZEPAM 0.5 MG: 0.5 TABLET ORAL at 23:59

## 2021-01-11 RX ADMIN — REMDESIVIR 200 MG: 100 INJECTION, POWDER, LYOPHILIZED, FOR SOLUTION INTRAVENOUS at 23:58

## 2021-01-11 RX ADMIN — FAMOTIDINE 20 MG: 20 TABLET, FILM COATED ORAL at 23:59

## 2021-01-11 RX ADMIN — SODIUM CHLORIDE 1000 ML: 9 INJECTION, SOLUTION INTRAVENOUS at 15:53

## 2021-01-11 RX ADMIN — IOPAMIDOL 75 ML: 755 INJECTION, SOLUTION INTRAVENOUS at 20:21

## 2021-01-11 RX ADMIN — ACETAMINOPHEN 650 MG: 325 TABLET ORAL at 15:52

## 2021-01-11 RX ADMIN — METHYLPREDNISOLONE SODIUM SUCCINATE 80 MG: 125 INJECTION, POWDER, FOR SOLUTION INTRAMUSCULAR; INTRAVENOUS at 15:52

## 2021-01-11 ASSESSMENT — PAIN SCALES - GENERAL: PAINLEVEL_OUTOF10: 5

## 2021-01-11 NOTE — ED NOTES
Placed on 3l nasal cannula on arrival for low pulse ox.  Pt grunting with labored respirations     Antonio Birmingham RN  01/11/21 7387

## 2021-01-11 NOTE — LETTER
Beneficiary Notification Letter  BPCI Advanced     Your Doctor or [address],    We wanted to let you know that your health care provider, [address] [place] [address] [place], has volunteered to take part in our [facility] Medicaid Services (CMS) Bundled Payments for [facility] (BPCI Advanced). This doesnt change your Medicare rights or benefits and you dont need to do anything. What are bundled payments? A bundled payment combines, or bundles together, payments that Medicare makes to your health care providers for the many different kinds of medical services you might get in a specific time period. In BPCI Advanced, this time period could include a hospital inpatient stay or outpatient procedure, plus 90 days. Why would Medicare bundle payments? Bundled payments are thought of as a value-based way to pay because health care providers are responsible for both the quality and cost of medical care they give. This is a relatively new way of paying health care providers compared to thefee-for-service way Medicare has traditionally paid, where providers are paid separately for each service they provide. Bundled payments encourage these providers to work together to provide better, more coordinated care during your hospital stay, or outpatient procedure, and through your recovery. What does BPCI Advance mean for me? Youre more likely to get even better care when hospitals, doctors, and other health care providers work together. In BPCI Advanced, hospitals, doctors, and other health care providers may be rewarded for providing better, more coordinated health care. Medicare will watch BPCI Advanced participants closely to make sure that you and other patients keep getting efficient, high quality care. What do I need to know about BPCI Advanced? Whats most important for you to know is that your Medicare rights and benefits wont change because your health care provider is participating in [address]. Medicare will keep covering all of your medically necessary services. Even though Medicare will pay your doctor in a different way under BPCI Advanced, how much you have to pay wont change. Health care providers and suppliers who are enrolled in Medicare will submit their Medicare claims like they always have. Youll have all the same Medicare rights and protections, including the right to choose which hospital, doctor, or other health care provider you see. If you dont want to get care from a health care provider whos participating in [address], then youll have to choose a different health care provider whos not participating in the Model. How can I give feedback about my health care? Medicare might ask you to take a voluntary survey about the services and care you received from [address] [place] [address] during your hospital stay or outpatient procedure and for a specific period of time afterwards. You can decide whether you want to take the voluntary survey, but if you do, itll help Medicare make BPCI Advanced and the care of other Medicare patients better. If you have concerns or complaints about your care, you can:   · Talk to your doctor or health care provider. · Contact your Beneficiary and Family Centered Care Quality Improvement   Organization [facility]). You can get your [employer]-QIOs phone number  at  Medicare.gov/contacts or by calling 1-800-MEDICARE. TTY users can call  8-148.348.6958. Where can I learn more about BPCI Advanced? Learn more about BPCI Advanced at https://innovation.cms.gov/initiatives/bpci-advanced/:  · A list of all the hospitals and physician group practices in the country participating in [address]. · All of the inpatient and outpatient Clinical Episodes that are currently included under BPCI Advanced. A Clinical Episode is a grouping of medical conditions or diagnoses that are included in the [address] [facility].

## 2021-01-11 NOTE — LETTER
Beneficiary Notification Letter  BPCI Advanced     Your Doctor or [address],    We wanted to let you know that your health care provider, [address], has volunteered to take part in our [facility] for [facility] Medicaid Services (CMS) Bundled Payments for [facility] (BPCI Advanced). This doesnt change your Medicare rights or benefits and you dont need to do anything. What are bundled payments? A bundled payment combines, or bundles together, payments that Medicare makes to your health care providers for the many different kinds of medical services you might get in a specific time period. In BPCI Advanced, this time period could include a hospital inpatient stay or outpatient procedure, plus 90 days. Why would Medicare bundle payments? Bundled payments are thought of as a value-based way to pay because health care providers are responsible for both the quality and cost of medical care they give. This is a relatively new way of paying health care providers compared to thefee-for-service way Medicare has traditionally paid, where providers are paid separately for each service they provide. Bundled payments encourage these providers to work together to provide better, more coordinated care during your hospital stay, or outpatient procedure, and through your recovery. What does BPCI Advance mean for me? Youre more likely to get even better care when hospitals, doctors, and other health care providers work together. In BPCI Advanced, hospitals, doctors, and other health care providers may be rewarded for providing better, more coordinated health care. Medicare will watch BPCI Advanced participants closely to make sure that you and other patients keep getting efficient, high quality care. What do I need to know about BPCI Advanced? Whats most important for you to know is that your Medicare rights and benefits wont change because your health care provider is participating in [address]. Medicare will keep covering all of your medically necessary services. Even though Medicare will pay your doctor in a different way under BPCI Advanced, how much you have to pay wont change. Health care providers and suppliers who are enrolled in Medicare will submit their Medicare claims like they always have. Youll have all the same Medicare rights and protections, including the right to choose which hospital, doctor, or other health care provider you see. If you dont want to get care from a health care provider whos participating in [address] [place], then youll have to choose a different health care provider whos not participating in the Model. How can I give feedback about my health care? Medicare might ask you to take a voluntary survey about the services and care you received from [address] [place] [address] during your hospital stay or outpatient procedure and for a specific period of time afterwards. You can decide whether you want to take the voluntary survey, but if you do, itll help Medicare make BPCI Advanced and the care of other Medicare patients better. If you have concerns or complaints about your care, you can:   · Talk to your doctor or health care provider. · Contact your Beneficiary and Family Centered Care Quality Improvement   Organization [facility]). You can get your [facility]-QIOs phone number  at  Medicare.gov/contacts or by calling 1-800-MEDICARE. TTY users can call  3-773.938.7787. Where can I learn more about BPCI Advanced? Learn more about BPCI Advanced at https://innovation.cms.gov/initiatives/bpci-advanced/:  · A list of all the hospitals and physician group practices in the country participating in [address].

## 2021-01-11 NOTE — ED NOTES
Assumed care of patient. Vitals remain stable. Bloodwork sent to lab.       Christopher Garcia RN  01/11/21 5014

## 2021-01-11 NOTE — ED NOTES
Bed:  [place] [address]  Expected date:   Expected time:   Means of arrival:   Comments:  19[address] Abigail Sanders,7Gws, RN  01/11/21 1702

## 2021-01-11 NOTE — ED NOTES
Bed: 11  Expected date:   Expected time:   Means of arrival:   Comments:  Shayy Triplett RN  01/11/21 2939

## 2021-01-11 NOTE — PROGRESS NOTES
21  Alyssa Vizcaino : 1960 Sex: male  Age: 64 y.o. Chief Complaint   Patient presents with    Cough     Symptoms started over weekend.  Fever    Congestion         HPI:      High risk individual  presents today with house nurse complaining of uri  for 7-8  days. Multiple comorbidities. Known exposure to COVID -  n    Symptoms include:    Adequate PO fluid intake - y  Adequate PO nutrition - y  Fever - y up to 101.4  Chills - y  [place] - y  Generalized weakness - y  Feeling of dehydration - ? Poor historian  Loss of smell - n  Loss of taste - n  Headache -  ? Stiff Neck - n  Dizziness/Lightheadedness/Syncope/Near Syncope - n  Blurred Vision/ Diplopia - n  Nasal Congestion - y  Sinus Pressure - y  Rhinorrhea - y  PND - y  Sore Throat - y  Chest Pain - n  Palpitations - n  Cough - y  SOB - y  Wheezing - y  Hemoptysis - n  Abdominal Pain - n  Nausea  - y  Vomiting - n  Diarrhea - n  Urinary Symptoms -  y  Leg Pain/Swelling/Redness/Inflammation - n  Numbness/Tingling/Focal Weakness/Slurred Speech/Facial Droop - n        No other complaints or concerns.       ROS:  As above      Current Outpatient Medications:     acetaminophen (TYLENOL) 325 MG tablet, acetaminophen acetaminophen 325 mg tablet 2 tablet(s) Every 6 hours as needed for pain as needed for pain By Christo ([id number]), Disp: , Rfl:     ondansetron (ZOFRAN) 4 MG tablet, Take 4 mg by mouth every 8 hours as needed for Nausea or Vomiting, Disp: , Rfl:     calcium carbonate (TUMS) 500 MG chewable tablet, Take 1 tablet by mouth daily, Disp: , Rfl:     memantine (NAMENDA) 10 MG tablet, Take 10 mg by mouth 2 times daily, Disp: , Rfl:     metoclopramide (REGLAN) 5 MG tablet, Take 5 mg by mouth 4 times daily, Disp: , Rfl:     omeprazole (PRILOSEC) 20 MG delayed release capsule, Take 40 mg by mouth daily, Disp: , Rfl:     rivastigmine (EXELON) 6 MG capsule, Take 6 mg by mouth 2 times daily, Disp: , Rfl:   rizatriptan (MAXALT) 10 MG tablet, Take 10 mg by mouth once as needed for Migraine May repeat in 2 hours if needed, Disp: , Rfl:     clonazePAM (KLONOPIN) 0.5 MG tablet, Take 0.5 mg by mouth 2 times daily. ., Disp: , Rfl:     fluticasone (FLONASE) 50 MCG/ACT nasal spray, 1 spray by Nasal route daily, Disp: , Rfl:     levETIRAcetam (KEPPRA) 500 MG tablet, Take 1,000 mg by mouth 2 times daily, Disp: , Rfl:     mirtazapine (REMERON) 15 MG tablet, Take 15 mg by mouth nightly, Disp: , Rfl:     Multiple Vitamins-Minerals (THERAPEUTIC MULTIVITAMIN-MINERALS) tablet, Take 1 tablet by mouth daily, Disp: , Rfl:     PHENobarbital (LUMINAL) 60 MG tablet, Take 60 mg by mouth 3 times daily. ., Disp: , Rfl:     cycloSPORINE (RESTASIS) 0.05 % ophthalmic emulsion, Place 1 drop into both eyes every 12 hours, Disp: , Rfl:     simvastatin (ZOCOR) 10 MG tablet, Take 10 mg by mouth nightly, Disp: , Rfl:     topiramate (TOPAMAX) 100 MG tablet, Take 100 mg by mouth 2 times daily, Disp: , Rfl:     cyanocobalamin 1000 MCG/ML injection, Inject 1,000 mcg into the muscle every 30 days, Disp: , Rfl:     vitamin D (ERGOCALCIFEROL) 52358 units CAPS capsule, Take 50,000 Units by mouth once a week, Disp: , Rfl:     vitamin E 400 UNIT capsule, Take 400 Units by mouth daily, Disp: , Rfl:     Cariprazine HCl (VRAYLAR) 3 MG CAPS, Take by mouth nightly, Disp: , Rfl:   Allergies   Allergen Reactions    Asa [Aspirin]     Augmentin [Amoxicillin-Pot Clavulanate]     Pcn [Penicillins]        Past Medical History:   Diagnosis Date    Bipolar 1 disorder (HCC)     Depression     GERD (gastroesophageal reflux disease)     Hydrocephaly (HCC)     Seizure ([facility]      No past surgical history on file. No family history on file. Social History     Tobacco Use    Smoking status: Current Every Day Smoker     Packs/day: 0.50     Types: Cigarettes    Smokeless tobacco: Never Used   Substance Use Topics    Alcohol use: No    Drug use:  No Social History     Social History Narrative    Not on file        Vitals:    01/11/21 1324   Pulse: 122   Resp: 20   Temp: 98.2 °F (36.8 °C)   SpO2: (!) 88%   Weight: 232 lb (105.2 kg)   Height: 6' 5\" (1.956 m)     Wt Readings from Last 3 Encounters:   01/11/21 232 lb (105.2 kg)   02/16/20 232 lb (105.2 kg)   05/13/18 250 lb (113.4 kg)        Physical Exam    Exam:  Const: Appears weak and SOB  Head/Face: Atraumatic, normocephalic on inspection. Eyes: No discharge from the eyes. Sclerae clear. ENMT:  Ears Clear, dry MM  Neck: Supple. Palpation reveals no adenopathy. No masses appreciated. No JVD. Resp: diminished and rhonchi  CV: RRR   Extremities: No clubbing or cyanosis. No edema of the lower limbs  bilaterally. No calf inflammation or tenderness. Abdomen: Abdomen is soft, nontender, and nondistended. No abdominal masses  appreciated. No palpable hepatosplenomegaly. Bowel sounds are normoactive. Skin: Dry and warm with no rash. Muscular skeletal: No acute joint inflammation. Neuro: No acute deficit    Office Labs This Visit :  No results found for this visit on 01/11/21. Assessment and Plan:     Diagnosis Orders   1. Bronchitis r/o pneumonia COVID-19 Ambulatory   2. Suspected COVID-19 virus infection  POCT COVID-19, Antigen    COVID-19 Ambulatory   3. Hypoxia         No problem-specific Assessment & Plan notes found for this encounter. Plan as above. Counseled extensively and differential diagnoses relevant to above were reviewed, including serious etiologies. Side effects and interactions of medications were reviewed. Rapid COVID  test Negative. The risk of FN reviewed. Confirmatory test will be sent. Patient is to call us if they do not receive result. Niharika continues to be suspected based on clinical presentation. Counseled extensively. Potential comorbidities that may increase risk reviewed, multiple. Standard precautions reviewed. Quarantine recommendations reviewed. There was shared decision making throughout the process. Limitations of this flu clinic reviewed. Role of emergency room reviewed. The unfortunate uncertainties and unpredictable nature of this virus reviewed. Various potential outcomes reviewed including complete recovery, debilitating sequelae which can be permanent as well as fatality. Certain aspects of this virus including the potential thrombotic risk can cause sudden debilitating/fatal events, without warning. After discussion and presentation, it was agreed upon to proceed directly to ER for further eval/tx. Risk of PT reviewed. prog guarded. No follow-ups on file. Signs and symptoms to watch for discussed, serious signs and symptoms reviewed. ER if any. Charu Kam MD    Patients are advised to check with insurance company to ensure coverage and to fully understand benefits and cost prior to any testing. This note was created with the assistance of voice recognition software. Document was reviewed however may contain grammatical errors.

## 2021-01-11 NOTE — ED NOTES
Cherrie Chen, nurse, caretaker for patient, waiting in waiting room.       Peg Segal RN  01/11/21 4538

## 2021-01-12 DIAGNOSIS — J40 BRONCHITIS: ICD-10-CM

## 2021-01-12 DIAGNOSIS — Z20.822 SUSPECTED COVID-19 VIRUS INFECTION: ICD-10-CM

## 2021-01-12 LAB
APTT: 31.6 SEC (ref 24.5–35.1)
BASOPHILS ABSOLUTE: 0.03 E9/L (ref 0–0.2)
BASOPHILS RELATIVE PERCENT: 0.4 % (ref 0–2)
C-REACTIVE PROTEIN: 23.6 MG/DL (ref 0–0.4)
EKG ATRIAL RATE: 115 BPM
EKG P AXIS: 45 DEGREES
EKG P-R INTERVAL: 130 MS
EKG Q-T INTERVAL: 318 MS
EKG QRS DURATION: 92 MS
EKG QTC CALCULATION (BAZETT): 439 MS
EKG R AXIS: 69 DEGREES
EKG T AXIS: 60 DEGREES
EKG VENTRICULAR RATE: 115 BPM
EOSINOPHILS ABSOLUTE: 0.05 E9/L (ref 0.05–0.5)
EOSINOPHILS RELATIVE PERCENT: 0.6 % (ref 0–6)
FERRITIN: 1088 NG/ML
FIBRINOGEN: >700 MG/DL (ref 225–540)
HCT VFR BLD CALC: 41.6 % (ref 37–54)
HEMOGLOBIN: 14 G/DL (ref 12.5–16.5)
IMMATURE GRANULOCYTES #: 0.04 E9/L
IMMATURE GRANULOCYTES %: 0.5 % (ref 0–5)
INR BLD: 1.4
LYMPHOCYTES ABSOLUTE: 2.11 E9/L (ref 1.5–4)
LYMPHOCYTES RELATIVE PERCENT: 27.3 % (ref 20–42)
MCH RBC QN AUTO: 31.5 PG (ref 26–35)
MCHC RBC AUTO-ENTMCNC: 33.7 % (ref 32–34.5)
MCV RBC AUTO: 93.7 FL (ref 80–99.9)
MONOCYTES ABSOLUTE: 0.48 E9/L (ref 0.1–0.95)
MONOCYTES RELATIVE PERCENT: 6.2 % (ref 2–12)
NEUTROPHILS ABSOLUTE: 5.01 E9/L (ref 1.8–7.3)
NEUTROPHILS RELATIVE PERCENT: 65 % (ref 43–80)
PDW BLD-RTO: 12.9 FL (ref 11.5–15)
PLATELET # BLD: 177 E9/L (ref 130–450)
PMV BLD AUTO: 11.4 FL (ref 7–12)
PROTHROMBIN TIME: 17 SEC (ref 9.3–12.4)
RBC # BLD: 4.44 E12/L (ref 3.8–5.8)
REASON FOR REJECTION: NORMAL
REJECTED TEST: NORMAL
SEDIMENTATION RATE, ERYTHROCYTE: 54 MM/HR (ref 0–15)
WBC # BLD: 7.7 E9/L (ref 4.5–11.5)

## 2021-01-12 PROCEDURE — 2500000003 HC RX 250 WO HCPCS: Performed by: INTERNAL MEDICINE

## 2021-01-12 PROCEDURE — 85610 PROTHROMBIN TIME: CPT

## 2021-01-12 PROCEDURE — 82728 ASSAY OF FERRITIN: CPT

## 2021-01-12 PROCEDURE — 85025 COMPLETE CBC W/AUTO DIFF WBC: CPT

## 2021-01-12 PROCEDURE — 85384 FIBRINOGEN ACTIVITY: CPT

## 2021-01-12 PROCEDURE — 6360000002 HC RX W HCPCS: Performed by: INTERNAL MEDICINE

## 2021-01-12 PROCEDURE — 2580000003 HC RX 258: Performed by: INTERNAL MEDICINE

## 2021-01-12 PROCEDURE — 2060000000 HC ICU INTERMEDIATE R&B

## 2021-01-12 PROCEDURE — 6370000000 HC RX 637 (ALT 250 FOR IP): Performed by: INTERNAL MEDICINE

## 2021-01-12 PROCEDURE — 97530 THERAPEUTIC ACTIVITIES: CPT

## 2021-01-12 PROCEDURE — 97161 PT EVAL LOW COMPLEX 20 MIN: CPT

## 2021-01-12 PROCEDURE — 93010 ELECTROCARDIOGRAM REPORT: CPT | Performed by: INTERNAL MEDICINE

## 2021-01-12 PROCEDURE — 85730 THROMBOPLASTIN TIME PARTIAL: CPT

## 2021-01-12 PROCEDURE — 86140 C-REACTIVE PROTEIN: CPT

## 2021-01-12 PROCEDURE — 2700000000 HC OXYGEN THERAPY PER DAY

## 2021-01-12 PROCEDURE — 97165 OT EVAL LOW COMPLEX 30 MIN: CPT

## 2021-01-12 PROCEDURE — 85651 RBC SED RATE NONAUTOMATED: CPT

## 2021-01-12 RX ORDER — PETROLATUM 420 MG/G
OINTMENT TOPICAL 2 TIMES DAILY PRN
Status: DISCONTINUED | OUTPATIENT
Start: 2021-01-12 | End: 2021-01-15 | Stop reason: HOSPADM

## 2021-01-12 RX ADMIN — TOPIRAMATE 100 MG: 100 TABLET, FILM COATED ORAL at 11:07

## 2021-01-12 RX ADMIN — FAMOTIDINE 20 MG: 20 TABLET, FILM COATED ORAL at 11:07

## 2021-01-12 RX ADMIN — ACETAMINOPHEN 650 MG: 325 TABLET ORAL at 22:00

## 2021-01-12 RX ADMIN — Medication 2000 UNITS: at 11:08

## 2021-01-12 RX ADMIN — RIVASTIGMINE TARTRATE 6 MG: 3 CAPSULE ORAL at 22:00

## 2021-01-12 RX ADMIN — RIVASTIGMINE TARTRATE 6 MG: 3 CAPSULE ORAL at 11:07

## 2021-01-12 RX ADMIN — MICONAZOLE NITRATE: 20 POWDER TOPICAL at 22:00

## 2021-01-12 RX ADMIN — METOCLOPRAMIDE 5 MG: 10 TABLET ORAL at 11:08

## 2021-01-12 RX ADMIN — FAMOTIDINE 20 MG: 20 TABLET, FILM COATED ORAL at 22:01

## 2021-01-12 RX ADMIN — CARIPRAZINE 3 MG: 1.5 CAPSULE, GELATIN COATED ORAL at 22:00

## 2021-01-12 RX ADMIN — PHENOBARBITAL 64.8 MG: 32.4 TABLET ORAL at 11:07

## 2021-01-12 RX ADMIN — RIVASTIGMINE TARTRATE 6 MG: 3 CAPSULE ORAL at 00:00

## 2021-01-12 RX ADMIN — REMDESIVIR 100 MG: 100 INJECTION, POWDER, LYOPHILIZED, FOR SOLUTION INTRAVENOUS at 22:07

## 2021-01-12 RX ADMIN — CARIPRAZINE 3 MG: 1.5 CAPSULE, GELATIN COATED ORAL at 00:00

## 2021-01-12 RX ADMIN — TOPIRAMATE 100 MG: 100 TABLET, FILM COATED ORAL at 22:01

## 2021-01-12 RX ADMIN — ENOXAPARIN SODIUM 40 MG: 40 INJECTION SUBCUTANEOUS at 21:59

## 2021-01-12 RX ADMIN — CLONAZEPAM 0.5 MG: 0.5 TABLET ORAL at 22:00

## 2021-01-12 RX ADMIN — PHENOBARBITAL 64.8 MG: 32.4 TABLET ORAL at 22:01

## 2021-01-12 RX ADMIN — PHENOBARBITAL 64.8 MG: 32.4 TABLET ORAL at 00:00

## 2021-01-12 RX ADMIN — TOPIRAMATE 100 MG: 100 TABLET, FILM COATED ORAL at 00:00

## 2021-01-12 RX ADMIN — Medication 100 MG: at 11:08

## 2021-01-12 RX ADMIN — ZINC SULFATE 220 MG (50 MG) CAPSULE 50 MG: CAPSULE at 11:08

## 2021-01-12 RX ADMIN — SODIUM CHLORIDE, PRESERVATIVE FREE 10 ML: 5 INJECTION INTRAVENOUS at 11:45

## 2021-01-12 RX ADMIN — METOCLOPRAMIDE 5 MG: 10 TABLET ORAL at 17:14

## 2021-01-12 RX ADMIN — MICONAZOLE NITRATE: 20 POWDER TOPICAL at 11:45

## 2021-01-12 RX ADMIN — PHENOBARBITAL 64.8 MG: 32.4 TABLET ORAL at 17:14

## 2021-01-12 RX ADMIN — SODIUM CHLORIDE, PRESERVATIVE FREE 10 ML: 5 INJECTION INTRAVENOUS at 21:59

## 2021-01-12 RX ADMIN — ENOXAPARIN SODIUM 40 MG: 40 INJECTION SUBCUTANEOUS at 11:08

## 2021-01-12 RX ADMIN — BENZONATATE 100 MG: 100 CAPSULE ORAL at 11:08

## 2021-01-12 RX ADMIN — CLONAZEPAM 0.5 MG: 0.5 TABLET ORAL at 11:07

## 2021-01-12 RX ADMIN — SODIUM CHLORIDE, PRESERVATIVE FREE 10 ML: 5 INJECTION INTRAVENOUS at 00:00

## 2021-01-12 RX ADMIN — MEMANTINE HYDROCHLORIDE 10 MG: 10 TABLET, FILM COATED ORAL at 22:00

## 2021-01-12 RX ADMIN — METOCLOPRAMIDE 5 MG: 10 TABLET ORAL at 22:00

## 2021-01-12 RX ADMIN — DEXAMETHASONE SODIUM PHOSPHATE 6 MG: 4 INJECTION, SOLUTION INTRAMUSCULAR; INTRAVENOUS at 21:59

## 2021-01-12 RX ADMIN — MEMANTINE HYDROCHLORIDE 10 MG: 10 TABLET, FILM COATED ORAL at 11:08

## 2021-01-12 RX ADMIN — ATORVASTATIN CALCIUM 10 MG: 10 TABLET, FILM COATED ORAL at 22:00

## 2021-01-12 ASSESSMENT — PAIN SCALES - GENERAL: PAINLEVEL_OUTOF10: 0

## 2021-01-12 NOTE — PROGRESS NOTES
Order placed for catheter removal.  Pt does not have an indwelling catheter, external [place] cath was applied this morning for incontinence.

## 2021-01-12 NOTE — CARE COORDINATION
Social work / Discharge Planning:       [address] Social work spoke to the patient's nurse from [facility] ([phone]). She informed social work that patient is normally independent and very high functioning. He is currently using 3 liters of oxygen which he does not have at home. PT/OT evaluations ordered with request for daily therapy. Per Mamie from [facility], the patient will need to be independent to return and can return as covid positive. Remdesivir has been ordered. Will need to keep the group home updated daily for discharge planning.   Electronically signed by BETTINA Molina on 1/12/2021 at 10:50 AM

## 2021-01-12 NOTE — PROGRESS NOTES
Physical Therapy    Facility/Department: [facility] MED SURG  Initial Assessment    NAME: Lázaro Lake  : 1960  MRN: [id number]    Date of Service: 2021      Patient Diagnosis(es): There were no encounter diagnoses. has a past medical history of Bipolar 1 disorder ([facility] [address] Depression, GERD (gastroesophageal reflux disease), Hydrocephaly ([facility] [address] and Seizure disorder ([facility] has no past surgical history on file. Referring Provider:  Elijah Vela DO      Evaluating Therapist: Alie Seaman PT    Room #:610  DIAGNOSIS: Covid  Additional Pertinent History:Bipolar, Hydrocephaly, Seizure disorder  PRECAUTIONS: falls, O2, droplet plus isolation, alarm    Social:  Pt admitted from group home. Independent without deivce. Initial Evaluation  Date: 21 Treatment      Short Term/ Long Term   Goals   Was pt agreeable to Eval/treatment? yes     Does pt have pain? No c/o pain     Bed Mobility  Rolling: independent  Supine to sit: independent  Sit to supine: NT  Scooting: independent  independent   Transfers Sit to stand: SBA  Stand to sit: SBA  Stand pivot: SBA  independent   Ambulation    20 feet with no devcie with CGA  75 feet with no device independent   Stair Negotiation  Ascended and descended  NT secondary to isolation   TBA   LE strength     4-/5    4./5   balance      Fair+     AM-PAC Raw score               20/24         Pt is alert and grossly oriented  LE ROM: WFL  Sensation: intact  Edema: none  Endurance: fair  Chair alarm: yes     ASSESSMENT  Pt displays functional ability as noted in the objective portion of this evaluation. Patient education  Pt educated on fall risk    Patient response to education:   Pt verbalized understanding Pt demonstrated skill Pt requires further education in this area   yes   yes     Comments/treatment:  Pt in bed upon arrival to room. Agreeable to PT session. Increased time to complete mobility. Pt instructed in safety with transfer.   Ambulated in room without device CGA. Knees flexed and decreased step length. Assist to manage O2 line. Pt in chair at end of session with lunch tray in front of him and call light in reach. SpO2 on 2L remained 90 or above during session. Pt's/ family goals   1. None satated    Patient and or family understand(s) diagnosis, prognosis, and plan of care. PLAN:    PLAN OF CARE:    Current Treatment Recommendations     [x] Strengthening     [] ROM   [x] Balance Training   [x] Endurance Training   [x] Transfer Training   [x] Gait Training   [] Stair Training   [] Positioning   [x] Safety and Education Training   [x] Patient/Caregiver Education   [] HEP  [] Other     Frequency of treatments: 2-5x/week x 5.days    Time in  1140  Time out  1205    Total Treatment Time  9 minutes     Evaluation Time includes thorough review of current medical information, gathering information on past medical history/social history and prior level of function, completion of standardized testing/informal observation of tasks, assessment of data and education on plan of care and goals.     CPT codes:  [x] Low Complexity PT evaluation 81411  [] Moderate Complexity PT evaluation 92058  [] High Complexity PT evaluation 95468  [] PT Re-evaluation 39120  [] Gait training 79764 minutes  [] Manual therapy 45115 minutes  [x] Therapeutic activities 9 74746 minutes  [] Therapeutic exercises 87323 minutes  [] Neuromuscular reeducation 30680 minutes     [facility] PSYCHIATRY PT [id number]

## 2021-01-12 NOTE — PROGRESS NOTES
Occupational Therapy  OCCUPATIONAL THERAPY INITIAL EVALUATION      Date:2021  Patient Name: Tierra Mcgill  MRN: [id number]  : 1960  Room: [address] [place] [address]A    Referring Provider: Geneva Truong       Evaluating OT: Torrey Castorena OTR/L [id number]    AM-PAC Daily Activity Raw Score:     Recommended Adaptive Equipment:  TBD     Diagnosis: Pneumonia, COVID 19     Pertinent Medical History: bipolar disorder, seizure   Precautions:  Falls, alarm, O2, DROPLET PLUS      Home Living: Pt lives at group home,  Bed/bath on 2nd floor   Independent with ADL, no device for mobility      Pain Level: no pain ;   Cognition: alert, oriented and conversing. Slow responses - flat affect    Memory:  Fair    Sequencing:  Fair    Problem solving:  Fair    Judgement/safety:  fair     Functional Assessment:   Initial Eval Status  Date: 21 Treatment Status  Date: STGs = LTGs  Time frame: 7-10 days   Feeding Independent     Grooming Set-up   Independent   UB Dressing Set-up   Independent    LB Dressing Min A  Able to doff sock, assist donning   Mod I    Bathing Min A  Mod I    Toileting Independent      Bed Mobility  Independent   Supine <> sit      Functional Transfers SBA  Sit-stand from bed, chair  Mod  I   Functional Mobility CGA, no device  Ambulating in room   2 L O2   No signs or complaints of SOB  O2 sats in 90s   Mod I with good tolerance    Balance Sitting:     Static:  Independent     Dynamic:SBA   Standing: CGA   Independent  with good tolerance    Activity Tolerance Fair with light activity   Good with ADL activity    Visual/  Perceptual Glasses: none by bedside                 Hand Dominance right    Strength ROM Additional Info:    RUE  4/5  WFL good  and wfl FMC/dexterity noted during ADL tasks       LUE 4/5  WFL good  and wfl FMC/dexterity noted during ADL tasks       Hearing: WFL   Sensation:  No c/o numbness or tingling   Tone: WFL    Comments: Upon arrival patient lying in bed .   At end of session, patient sitting in chair  with call light and phone within reach, all lines and tubes intact. ALARM  ON      Overall patient demonstrated  decreased independence and safety during completion of ADL/functional transfer/mobility tasks. Pt would benefit from continued skilled OT to increase safety and independence with completion of ADL/IADL tasks for functional independence and quality of life. Assessment of current deficits   Functional mobility [x]  ADLs [x] Strength [x]  Cognition []  Functional transfers  [x] IADLs [x] Safety Awareness [x]  Endurance [x]  Fine Motor Coordination [] Balance [x] Vision/perception [] Sensation []   Gross Motor Coordination [] ROM [] Delirium []                  Motor Control []       Plan of Care: 1-3 days/week for 1-2 weeks PRN   [x]ADL retraining/adapted techniques and AE recommendations to increase functional independence within precautions                    [x]Energy conservation techniques to improve tolerance for selfcare routine   [x]Functional transfer/mobility training/DME recommendations for increased independence, safety and fall prevention         [x]Patient/family education to increase safety and functional independence             [x]Environmental modifications for safe mobility and completion of ADLs                             []Cognitive retraining ex's to improve problem solving skills & safe participation in ADLs/IADLs     []Sensory re-education techniques to improve extremity awareness, maintain skin integrity and improve hand function                             []Visual/Perceptual retraining  to improve body awareness and safety during transfers and ADLs  []Splinting/positioning needs to maintain joint/skin integrity and prevent contractures  [x]Therapeutic activity to improve functional performance during ADLs.                                          [x]Therapeutic exercise to improve tolerance and functional strength for ADLs   [x]Balance retraining/tolerance tasks for facilitation of postural control with dynamic challenges during ADLs . []Neuromuscular re-education: facilitation of righting/equilibrium reactions, midline orientation, scapular stability/mobility, Normalization muscle     tone and facilitation active functional movement/Attention                         []Delirium prevention/treatment    [x]Positioning to improve functional independence  []Other:       Rehab Potential:  Good for established goals     Patient / Family Goal: return home      Patient and/or family were instructed on functional diagnosis, prognosis/goals and OT plan of care. Demonstrated fair  understanding. Eval Complexity: Low      Time In:1135  Time Out: 1150        Min Units   OT Eval Low 97165  x 1   OT Eval Medium 21328      OT Eval High 03624       OT Re-Eval [id number]       Therapeutic Ex 14520       Therapeutic Activities 95301       ADL/Self Care 51932       Orthotic Management 81050       Neuro Re-Ed 97204       Non-Billable Time          Evaluation Time includes thorough review of current medical information, gathering information on past medical history/social history and prior level of function, completion of standardized testing/informal observation of tasks, assessment of data and education on plan of care and goals.             Maria R Handy OTR/L [id number]

## 2021-01-12 NOTE — H&P
Internal Medicine History & Physical     Name: Rachel Vieyra  : 1960  Chief Complaint: Cough (SaO2 88-92% at urgent care earlier today) and Fever (as high as 101.4 at 0400 this am)  Primary Care Physician: Karen Miller MD  Admission date: 2021  Date of service: 2021     History of Present Illness  Lakeisha Jenkins is a 64y.o. year old male. Talked with the nursing staff at the patients group home who state the patient came to the ED due to low O2 sats at their facility and cold sxs for 4 days along with a fever. They stated his oxygen dropped into the 80s while sitting in his chair and ambulating. They took patient to the doctors office due to his sxs where he had a neg covid test but due to his sxs and his oxygen sats they sent him into the emergency room. He did have a positive exposure to covid on Wednesday. The patient presents with hypoxia that has been going on for 4 days. These symptoms are moderate in severity. Symptoms are made better by nothing. Symptoms are made worse by nothing. Associated symptoms include fever, headache, nasal congestion. There are no family or friends at bedside. The history is provided by the nursing staff at the group home. The patient is felt to be a poor historian. ED course:   Initial blood work and imaging studies performed. Admission recommended by ED physician. Case discussed with ED provider.  Meds in ED consisted of the following:  Medications   sodium chloride flush 0.9 % injection (has no administration in time range)   dexamethasone (PF) (DECADRON) injection 6 mg (has no administration in time range)   methylPREDNISolone sodium (SOLU-MEDROL) injection 80 mg (80 mg Intravenous Given 21 155)   acetaminophen (TYLENOL) tablet 650 mg (650 mg Oral Given 21 155)   0.9 % sodium chloride bolus (0 mLs Intravenous Stopped 21 180)   iopamidol (ISOVUE-370) 76 % injection 75 mL (75 mLs Intravenous Given 21)   sodium chloride flush 0.9 % injection 10 mL (10 mLs Intravenous Given 1/11/21 2019)       Past Medical History:   Diagnosis Date    Bipolar 1 disorder (HCC)     Depression     GERD (gastroesophageal reflux disease)     Hydrocephaly (HCC)     Seizure disorder (HCC)        No past surgical history on file. No family history on file. No pertinent family medical history with regard to current issues. Social History  Patient [facility] at [facility]. Employment: disabled  Illicit drug use- denies  TOBACCO:   reports that he has been smoking cigarettes. He has been smoking about 0.50 packs per day. He has never used smokeless tobacco.  ETOH:   reports no history of alcohol use. Home Medications  Prior to Admission medications    Medication Sig Start Date End Date Taking? Authorizing Provider   acetaminophen (TYLENOL) 325 MG tablet acetaminophen acetaminophen 325 mg tablet 2 tablet(s) Every 6 hours as needed for pain as needed for pain By Christo ([id number])   Yes Historical Provider, MD   ondansetron (ZOFRAN) 4 MG tablet Take 4 mg by mouth every 8 hours as needed for Nausea or Vomiting   Yes Historical Provider, MD   calcium carbonate (TUMS) 500 MG chewable tablet Take 1 tablet by mouth daily   Yes Historical Provider, MD   memantine (NAMENDA) 10 MG tablet Take 10 mg by mouth 2 times daily   Yes Historical Provider, MD   metoclopramide (REGLAN) 5 MG tablet Take 5 mg by mouth 4 times daily   Yes Historical Provider, MD   rivastigmine (EXELON) 6 MG capsule Take 6 mg by mouth 2 times daily   Yes Historical Provider, MD   rizatriptan (MAXALT) 10 MG tablet Take 10 mg by mouth once as needed for Migraine May repeat in 2 hours if needed   Yes Historical Provider, MD   clonazePAM (KLONOPIN) 0.5 MG tablet Take 0.5 mg by mouth 2 times daily. .   Yes Historical Provider, MD   fluticasone (FLONASE) 50 MCG/ACT nasal spray 1 spray by Nasal route daily   Yes Historical Provider, MD   Multiple Vitamins-Minerals (THERAPEUTIC MULTIVITAMIN-MINERALS) tablet Take 1 tablet by mouth daily   Yes Historical Provider, MD   PHENobarbital (LUMINAL) 60 MG tablet Take 60 mg by mouth 3 times daily. .   Yes Historical Provider, MD   cycloSPORINE (RESTASIS) 0.05 % ophthalmic emulsion Place 1 drop into both eyes every 12 hours   Yes Historical Provider, MD   simvastatin (ZOCOR) 10 MG tablet Take 10 mg by mouth nightly   Yes Historical Provider, MD   topiramate (TOPAMAX) 100 MG tablet Take 100 mg by mouth 2 times daily   Yes Historical Provider, MD   vitamin D (ERGOCALCIFEROL) 42254 units CAPS capsule Take 50,000 Units by mouth once a week   Yes Historical Provider, MD   vitamin E 400 UNIT capsule Take 400 Units by mouth daily   Yes Historical Provider, MD   Cariprazine HCl (VRAYLAR) 3 MG CAPS Take by mouth nightly   Yes Historical Provider, MD   omeprazole (PRILOSEC) 20 MG delayed release capsule Take 40 mg by mouth daily    Historical Provider, MD   cyanocobalamin 1000 MCG/ML injection Inject 1,000 mcg into the muscle every 30 days    Historical Provider, MD       Allergies  Allergies   Allergen Reactions    Asa [Aspirin]     Augmentin [Amoxicillin-Pot Clavulanate]     Pcn [Penicillins]        Review of Systems  Please see HPI above. All bolded are positive. All un-bolded are negative.   Constitutional Symptoms: fever, chills, fatigue, generalized weakness, diaphoresis, increase in thirst, loss of appetite  Eyes: vision change   Ears, Nose, Mouth, Throat: hearing loss, nasal congestion, sores in the mouth  Cardiovascular: chest pain, chest heaviness, palpitations  Respiratory: shortness of breath, wheezing, coughing  Gastrointestinal: abdominal pain, nausea, vomiting, diarrhea, constipation, melena, hematochezia, hematemesis  Genitourinary: dysuria, hematuria, increased frequency  Musculoskeletal: lower extremity edema, myalgias, arthralgias, back pain  Integumentary: rashes, itching   Neurological: headache, lightheadedness, dizziness, confusion, syncope, numbness, tingling, focal weakness  Psychiatric: depression, suicidal ideation, anxiety  Endocrine: unintentional weight change  Hematologic/Lymphatic: lymphadenopathy, easy bruising, easy bleeding   Allergic/Immunologic: recurrent infections      Objective  VITALS:  /74   Pulse 93   Temp 98.6 °F (37 °C) (Oral)   Resp 22   Ht 6' (1.829 m)   Wt 216 lb (98 kg)   SpO2 94%   BMI 29.29 kg/m²     Physical Exam:  General: awake, alert, oriented to person, seems confused, appears stated age, cooperative, no acute distress, pleasant, appropriate mood  Eyes: conjunctivae/corneas clear, sclera non icteric, EOMI  Ears: no obvious scars, no lesions, no masses, hearing intact  Mouth: mucous membranes moist, no obvious oral sores  Head: normocephalic, atraumatic  Neck: no JVD, no adenopathy, no thyromegaly, neck is supple, trachea is midline  Back: ROM normal, no CVA tenderness.   Chest: no pain on palpation  Lungs: clear to auscultation bilaterally, without rhonchi, crackle, wheezing, or rale, no retractions or use of accessory muscles  Heart: regular rate and regular rhythm, no murmur, normal S1, S2  Abdomen: soft, non-tender; bowel sounds normal; no masses, no organomegaly  : Deferred   Extremities: no lower extremity edema, extremities atraumatic, no cyanosis, no clubbing, 2+ pedal pulses palpated  Skin: normal color, normal texture, normal turgor, no rashes, no lesions  Neurologic:5/5 muscle strength throughout, normal muscle tone throughout, face symmetric, hearing intact, tongue midline, speech appropriate without slurring, sensation to fine touch intact in upper and lower extremities    Labs-   Lab Results   Component Value Date    WBC 7.7 01/12/2021    HGB 14.0 01/12/2021    HCT 41.6 01/12/2021     01/12/2021     01/11/2021    K 3.6 01/11/2021     01/11/2021    CREATININE 1.2 01/11/2021    BUN 17 01/11/2021    CO2 23 01/11/2021    GLUCOSE 116 (H) 01/11/2021    ALT 23 02/16/2020    AST 38 02/16/2020    INR 1.4 01/12/2021     Lab Results   Component Value Date    TROPONINI <0.01 02/16/2020       Recent Radiological Studies:  CTA PULMONARY W CONTRAST   Final Result   No evidence of pulmonary embolism. Bilateral patchy areas of ground-glass appearance mostly peripheral based   consistent with pneumonia. COVID pneumonia suspected. XR CHEST PORTABLE   Final Result   Pulmonary vascular congestion/early CHF. Assessment  Active Hospital Problems    Diagnosis    Pneumonia due to COVID-19 virus [U07.1, J12.82]     Priority: High    Acute respiratory failure with hypoxia ([facility]) [J96.01]     Priority: Medium    Seizure disorder (HCC) [G40.909]    Hydrocephaly ([facility]) [G91.9]    GERD (gastroesophageal reflux disease) [K21.9]    Depression [F32.9]    Bipolar 1 disorder (HCC) [F31.9]       Patient Active Problem List    Diagnosis Date Noted    Pneumonia due to COVID-19 virus 01/11/2021     Priority: High    Acute respiratory failure with hypoxia ([facility]) 01/11/2021     Priority: Medium    GERD (gastroesophageal reflux disease)     Depression     Bipolar 1 disorder (HCC)     Seizure disorder ([facility])     Hydrocephaly ([facility] [address]        Plan  · COVID-19 viral pneumonia w/ associated acute hypoxic respiratory failure: Contact/droplet isolation. Remdesivir per pharmacy. Decadron. ESR/CRP/ddimer/fibronogen/procalcitonin/ferritin/INR/PTT. Peg Perri as able. Tylenol prn fevers or myalgias. Self proning as able. BID Lovenox. Vitamin C/thiamine/Zinc/VitD/Pepcid. · Continue home medications. · PT/OT-- 20/24  · Follow labs  · DVT prophylaxis. · Please see orders for further management and care. ·  for discharge planning  · Discharge plan: TBD pending clinical improvement     The patient was seen and evaluated by myself and Kane Carrillo MD PGY-1  1/12/2021 1:48 PM     NOTE:  This report was transcribed using voice recognition software.   Every effort was made to ensure accuracy; however, inadvertent computerized transcription errors may be present. Addendum: I have personally participated in a face-to-face history and physical exam on the date of service with the patient. I have discussed the case with the resident. I also participated in medical decision making with the resident on the date of service and I agree with all of the pertinent clinical information unless indicated in my editing of the note. I have reviewed and edited the note above based on my findings during my history, exam, and decision making on the same day of service. My additional thoughts:    My resident discussed case with group home  [facility] as above     Electronically signed by Geovanny Coates DO on 1/12/2021 at 2:38 PM    I can be reached through [employer].

## 2021-01-13 LAB
ALBUMIN SERPL-MCNC: 3.1 G/DL (ref 3.5–5.2)
ALP BLD-CCNC: 71 U/L (ref 40–129)
ALT SERPL-CCNC: 156 U/L (ref 0–40)
ANION GAP SERPL CALCULATED.3IONS-SCNC: 9 MMOL/L (ref 7–16)
AST SERPL-CCNC: 179 U/L (ref 0–39)
ATYPICAL LYMPHOCYTE RELATIVE PERCENT: 5 % (ref 0–4)
BASOPHILS ABSOLUTE: 0 E9/L (ref 0–0.2)
BASOPHILS RELATIVE PERCENT: 0 % (ref 0–2)
BILIRUB SERPL-MCNC: 0.3 MG/DL (ref 0–1.2)
BUN BLDV-MCNC: 18 MG/DL (ref 6–20)
CALCIUM SERPL-MCNC: 8.2 MG/DL (ref 8.6–10.2)
CHLORIDE BLD-SCNC: 103 MMOL/L (ref 98–107)
CO2: 24 MMOL/L (ref 22–29)
CREAT SERPL-MCNC: 0.8 MG/DL (ref 0.7–1.2)
EOSINOPHILS ABSOLUTE: 0.07 E9/L (ref 0.05–0.5)
EOSINOPHILS RELATIVE PERCENT: 1 % (ref 0–6)
GFR AFRICAN AMERICAN: >60
GFR NON-AFRICAN AMERICAN: >60 ML/MIN/1.73
GLUCOSE BLD-MCNC: 131 MG/DL (ref 74–99)
HCT VFR BLD CALC: 39.1 % (ref 37–54)
HEMOGLOBIN: 13.5 G/DL (ref 12.5–16.5)
LYMPHOCYTES ABSOLUTE: 0.99 E9/L (ref 1.5–4)
LYMPHOCYTES RELATIVE PERCENT: 10 % (ref 20–42)
MCH RBC QN AUTO: 31.5 PG (ref 26–35)
MCHC RBC AUTO-ENTMCNC: 34.5 % (ref 32–34.5)
MCV RBC AUTO: 91.1 FL (ref 80–99.9)
MONOCYTES ABSOLUTE: 0.46 E9/L (ref 0.1–0.95)
MONOCYTES RELATIVE PERCENT: 7 % (ref 2–12)
NEUTROPHILS ABSOLUTE: 5.08 E9/L (ref 1.8–7.3)
NEUTROPHILS RELATIVE PERCENT: 77 % (ref 43–80)
PDW BLD-RTO: 12.9 FL (ref 11.5–15)
PLATELET # BLD: 238 E9/L (ref 130–450)
PMV BLD AUTO: 10.8 FL (ref 7–12)
POTASSIUM REFLEX MAGNESIUM: 4.8 MMOL/L (ref 3.5–5)
RBC # BLD: 4.29 E12/L (ref 3.8–5.8)
RBC # BLD: NORMAL 10*6/UL
SODIUM BLD-SCNC: 136 MMOL/L (ref 132–146)
TOTAL PROTEIN: 6.6 G/DL (ref 6.4–8.3)
WBC # BLD: 6.6 E9/L (ref 4.5–11.5)

## 2021-01-13 PROCEDURE — 85025 COMPLETE CBC W/AUTO DIFF WBC: CPT

## 2021-01-13 PROCEDURE — 6370000000 HC RX 637 (ALT 250 FOR IP): Performed by: INTERNAL MEDICINE

## 2021-01-13 PROCEDURE — 80053 COMPREHEN METABOLIC PANEL: CPT

## 2021-01-13 PROCEDURE — 2060000000 HC ICU INTERMEDIATE R&B

## 2021-01-13 PROCEDURE — 2580000003 HC RX 258: Performed by: INTERNAL MEDICINE

## 2021-01-13 PROCEDURE — 6360000002 HC RX W HCPCS: Performed by: INTERNAL MEDICINE

## 2021-01-13 PROCEDURE — 2500000003 HC RX 250 WO HCPCS: Performed by: INTERNAL MEDICINE

## 2021-01-13 PROCEDURE — XW033E5 INTRODUCTION OF REMDESIVIR ANTI-INFECTIVE INTO PERIPHERAL VEIN, PERCUTANEOUS APPROACH, NEW TECHNOLOGY GROUP 5: ICD-10-PCS | Performed by: INTERNAL MEDICINE

## 2021-01-13 PROCEDURE — 2700000000 HC OXYGEN THERAPY PER DAY

## 2021-01-13 RX ORDER — METOCLOPRAMIDE 10 MG/1
5 TABLET ORAL 4 TIMES DAILY
Status: DISCONTINUED | OUTPATIENT
Start: 2021-01-13 | End: 2021-01-15 | Stop reason: HOSPADM

## 2021-01-13 RX ADMIN — Medication 2000 UNITS: at 08:48

## 2021-01-13 RX ADMIN — BENZONATATE 100 MG: 100 CAPSULE ORAL at 08:49

## 2021-01-13 RX ADMIN — MEMANTINE HYDROCHLORIDE 10 MG: 10 TABLET, FILM COATED ORAL at 08:49

## 2021-01-13 RX ADMIN — CARIPRAZINE 3 MG: 1.5 CAPSULE, GELATIN COATED ORAL at 23:45

## 2021-01-13 RX ADMIN — PHENOBARBITAL 64.8 MG: 32.4 TABLET ORAL at 15:13

## 2021-01-13 RX ADMIN — TOPIRAMATE 100 MG: 100 TABLET, FILM COATED ORAL at 08:49

## 2021-01-13 RX ADMIN — RIVASTIGMINE TARTRATE 6 MG: 3 CAPSULE ORAL at 23:45

## 2021-01-13 RX ADMIN — Medication 100 MG: at 08:49

## 2021-01-13 RX ADMIN — FAMOTIDINE 20 MG: 20 TABLET, FILM COATED ORAL at 08:49

## 2021-01-13 RX ADMIN — METOCLOPRAMIDE 5 MG: 10 TABLET ORAL at 23:45

## 2021-01-13 RX ADMIN — REMDESIVIR 100 MG: 100 INJECTION, POWDER, LYOPHILIZED, FOR SOLUTION INTRAVENOUS at 23:45

## 2021-01-13 RX ADMIN — TOPIRAMATE 100 MG: 100 TABLET, FILM COATED ORAL at 23:45

## 2021-01-13 RX ADMIN — MICONAZOLE NITRATE: 20 POWDER TOPICAL at 08:50

## 2021-01-13 RX ADMIN — PHENOBARBITAL 64.8 MG: 32.4 TABLET ORAL at 08:48

## 2021-01-13 RX ADMIN — MEMANTINE HYDROCHLORIDE 10 MG: 10 TABLET, FILM COATED ORAL at 23:45

## 2021-01-13 RX ADMIN — ZINC SULFATE 220 MG (50 MG) CAPSULE 50 MG: CAPSULE at 08:49

## 2021-01-13 RX ADMIN — PHENOBARBITAL 64.8 MG: 32.4 TABLET ORAL at 23:45

## 2021-01-13 RX ADMIN — FAMOTIDINE 20 MG: 20 TABLET, FILM COATED ORAL at 23:45

## 2021-01-13 RX ADMIN — DEXAMETHASONE SODIUM PHOSPHATE 6 MG: 4 INJECTION, SOLUTION INTRAMUSCULAR; INTRAVENOUS at 23:45

## 2021-01-13 RX ADMIN — SODIUM CHLORIDE, PRESERVATIVE FREE 10 ML: 5 INJECTION INTRAVENOUS at 08:50

## 2021-01-13 RX ADMIN — CLONAZEPAM 0.5 MG: 0.5 TABLET ORAL at 23:45

## 2021-01-13 RX ADMIN — RIVASTIGMINE TARTRATE 6 MG: 3 CAPSULE ORAL at 08:48

## 2021-01-13 RX ADMIN — CLONAZEPAM 0.5 MG: 0.5 TABLET ORAL at 08:49

## 2021-01-13 RX ADMIN — ENOXAPARIN SODIUM 40 MG: 40 INJECTION SUBCUTANEOUS at 23:45

## 2021-01-13 RX ADMIN — MICONAZOLE NITRATE: 20 POWDER TOPICAL at 23:45

## 2021-01-13 RX ADMIN — ENOXAPARIN SODIUM 40 MG: 40 INJECTION SUBCUTANEOUS at 08:48

## 2021-01-13 RX ADMIN — METOCLOPRAMIDE 5 MG: 10 TABLET ORAL at 08:49

## 2021-01-13 RX ADMIN — ATORVASTATIN CALCIUM 10 MG: 10 TABLET, FILM COATED ORAL at 23:45

## 2021-01-13 RX ADMIN — METOCLOPRAMIDE 5 MG: 10 TABLET ORAL at 15:12

## 2021-01-13 ASSESSMENT — PAIN SCALES - GENERAL: PAINLEVEL_OUTOF10: 0

## 2021-01-13 NOTE — PROGRESS NOTES
Internal Medicine Progress Note    Patient's name: Jennifer Tay  : 1960  Admission date: 2021  Date of service: 2021   Room: [facility]  Primary care physician: Dee Dee Khan MD  Reason for visit: Follow-up for COVID-19    Subjective  Salvador Childs was seen and examined at bedside. He was resting comfortably in his room. He had no complaints. He was not wearing oxygen. Breathing is stable. He is tolerating all current treatments and medications. Review of Systems  There are no new complaints of chest pain, shortness of breath, abdominal pain, nausea, vomiting, diarrhea, constipation.     Hospital Medications  Current Facility-Administered Medications   Medication Dose Route Frequency Provider Last Rate Last Admin    Petrolatum 42 % OINT   Topical BID PRN Jeff Christensen, DO        miconazole (MICOTIN) 2 % powder   Topical BID Jeff DEAL Volino, DO   Given at 21 0850    thiamine tablet 100 mg  100 mg Oral Daily Jeff Christensen, DO   100 mg at 21 0849    Vitamin D (CHOLECALCIFEROL) tablet 2,000 Units  2,000 Units Oral Daily Jeff Gaminoino, DO   2,000 Units at 21 0848    zinc sulfate (ZINCATE) capsule 50 mg  50 mg Oral Daily Jeff Christensen, DO   50 mg at 21 0849    HYDROcodone-homatropine (HYCODAN) 5-1.5 MG/5ML syrup 5 mL  5 mL Oral Q4H PRN Jeff Gaminoino, DO        dexamethasone (DECADRON) injection 6 mg  6 mg Intravenous Daily Jeff Christensen, DO   6 mg at 21 2159    benzonatate (TESSALON) capsule 100 mg  100 mg Oral TID PRN Jeff Christensen, DO   100 mg at 21 0849    albuterol sulfate  (90 Base) MCG/ACT inhaler 2 puff  2 puff Inhalation Q6H PRN Jeff Gaminoino, DO        sodium chloride flush 0.9 % injection 10 mL  10 mL Intravenous 2 times per day Jeff Gaminoino, DO   10 mL at 21 0850    sodium chloride flush 0.9 % injection 10 mL  10 mL Intravenous PRN Jeff Christensen, DO        potassium chloride (KLOR-CON M) extended release tablet 40 mEq  40 mEq Oral PRN Jeff E Volino, DO        Or    potassium bicarb-citric acid (EFFER-K) effervescent tablet 40 mEq  40 mEq Oral PRN Jeff E Volino, DO        Or    potassium chloride 10 mEq/100 mL IVPB (Peripheral Line)  10 mEq Intravenous PRN Jeff E Volino, DO        enoxaparin (LOVENOX) injection 40 mg  40 mg Subcutaneous BID Jeff E Volino, DO   40 mg at 01/13/21 0848    promethazine (PHENERGAN) tablet 12.5 mg  12.5 mg Oral Q6H PRN Jeff E Hannyino, DO        Or    ondansetron (ZOFRAN) injection 4 mg  4 mg Intravenous Q6H PRN Jeff E Volino, DO        senna (SENOKOT) tablet 8.6 mg  1 tablet Oral Daily PRN Jeff E Hannyino, DO        famotidine (PEPCID) tablet 20 mg  20 mg Oral BID Jeff E Volino, DO   20 mg at 01/13/21 0849    acetaminophen (TYLENOL) tablet 650 mg  650 mg Oral Q6H PRN Jeff E Hannyino, DO   650 mg at 01/12/21 2200    Or    acetaminophen (TYLENOL) suppository 650 mg  650 mg Rectal Q6H PRN Jeff Gaminoino, DO        cariprazine hcl (VRAYLAR) capsule 3 mg  3 mg Oral Nightly Jeff TOYIN Hannyino, DO   3 mg at 01/12/21 2200    clonazePAM (KLONOPIN) tablet 0.5 mg  0.5 mg Oral BID Jeff E Volino, DO   0.5 mg at 01/13/21 0849    memantine (NAMENDA) tablet 10 mg  10 mg Oral BID Jeff Gaminoino, DO   10 mg at 01/13/21 0849    metoclopramide (REGLAN) tablet 5 mg  5 mg Oral 4x Daily Jeff E Volino, DO   5 mg at 01/13/21 0849    PHENobarbital (LUMINAL) tablet 64.8 mg  64.8 mg Oral TID Jeff Gaminoino, DO   64.8 mg at 01/13/21 0848    rivastigmine (EXELON) capsule 6 mg  6 mg Oral BID Jeff E Volino, DO   6 mg at 01/13/21 0848    topiramate (TOPAMAX) tablet 100 mg  100 mg Oral BID Jeff Christensen, DO   100 mg at 01/13/21 0849    atorvastatin (LIPITOR) tablet 10 mg  10 mg Oral Nightly Jeff Christensen DO   10 mg at 01/12/21 2200    polyvinyl alcohol (LIQUIFILM TEARS) 1.4 % ophthalmic solution 1 drop  1 drop Both Eyes PRN Jeff Christensen DO        remdesivir 100 mg in sodium chloride 0.9 % 250 mL IVPB 100 mg Intravenous Q24H Jeff Christensen DO   Stopped at 01/12/21 2244    0.9 % sodium chloride bolus  30 mL Intravenous PRN Jeff Christensen DO           PRN Medications  Petrolatum, HYDROcodone-homatropine, benzonatate, albuterol sulfate HFA, sodium chloride flush, potassium chloride **OR** potassium alternative oral replacement **OR** potassium chloride, promethazine **OR** ondansetron, senna, acetaminophen **OR** acetaminophen, polyvinyl alcohol, sodium chloride    Objective  Most Recent Recorded Vitals  BP (!) 92/57   Pulse 86   Temp 97.9 °F (36.6 °C) (Oral) Comment (Src): double checked 97.4 axillary  Resp 20   Ht 6' (1.829 m)   Wt 216 lb (98 kg)   SpO2 93%   BMI 29.29 kg/m²   I/O last 3 completed shifts: In: 500 [P.O.:240; I.V.:10; IV Piggyback:250]  Out: 4488 [Urine:1075]  No intake/output data recorded. Physical Exam:  General: AAO to person/place/time/purpose, NAD, no labored breathing  Eyes: conjunctivae/corneas clear, sclera non icteric  Skin: color/texture/turgor normal, no rashes or lesions  Lungs: Diminished but CTAB, no retractions/use of accessory muscles, no vocal fremitus, no rhonchi, no crackle, no rales  Heart: regular rate, regular rhythm, no murmur  Abdomen: soft, NT; bowel sounds normal; no masses,  no organomegaly  Extremities: atraumatic, no cyanosis, no edema  Neurologic: cranial nerves 2-12 grossly intact, no slurred speech. Most Recent Labs  Lab Results   Component Value Date    WBC 6.6 01/13/2021    HGB 13.5 01/13/2021    HCT 39.1 01/13/2021     01/13/2021     01/13/2021    K 4.8 01/13/2021     01/13/2021    CREATININE 0.8 01/13/2021    BUN 18 01/13/2021    CO2 24 01/13/2021    GLUCOSE 131 (H) 01/13/2021     (H) 01/13/2021     (H) 01/13/2021    INR 1.4 01/12/2021    LABA1C 5.5 10/22/2020       CTA PULMONARY W CONTRAST   Final Result   No evidence of pulmonary embolism.    Bilateral patchy areas of ground-glass appearance mostly peripheral based   consistent with pneumonia. COVID pneumonia suspected. XR CHEST PORTABLE   Final Result   Pulmonary vascular congestion/early CHF. Assessment   Active Hospital Problems    Diagnosis    Pneumonia due to COVID-19 virus [U07.1, J12.82]     Priority: High    Acute respiratory failure with hypoxia (HCC) [J96.01]     Priority: Medium    Seizure disorder ([facility] [address] [G40.909]    Hydrocephaly (HCC) [G91.9]    GERD (gastroesophageal reflux disease) [K21.9]    Depression [F32.9]    Bipolar 1 disorder ([facility]) [F31.9]         Plan  · COVID-19 viral pneumonia w/ associated acute hypoxic respiratory failure: Contact/droplet isolation. Remdesivir per pharmacy. Decadron. Jose Noland as able. Tylenol prn fevers or myalgias. Self proning as able. BID Lovenox. Vitamin C/thiamine/Zinc/VitD/Pepcid. Check ambulatory pulse ox. · PT/OT-- 20/24  · Follow labs  · DVT prophylaxis. · Please see orders for further management and care. ·  for discharge planning  · Discharge plan: Likely discharge back to group home tomorrow    Electronically signed by Destiny Simpson DO on 1/13/2021 at 1:04 PM    I can be reached through [employer].

## 2021-01-13 NOTE — PLAN OF CARE
Problem: Airway Clearance - Ineffective  Goal: Achieve or maintain patent airway  Outcome: Ongoing     Problem: Gas Exchange - Impaired  Goal: Absence of hypoxia  Outcome: Ongoing     Problem: Gas Exchange - Impaired  Goal: Promote optimal lung function  Outcome: Ongoing     Problem: Falls - Risk of:  Goal: Will remain free from falls  Description: Will remain free from falls  Outcome: Ongoing     Problem: Skin Integrity:  Goal: Will show no infection signs and symptoms  Description: Will show no infection signs and symptoms  Outcome: Ongoing     Problem: Skin Integrity:  Goal: Absence of new skin breakdown  Description: Absence of new skin breakdown  Outcome: Ongoing

## 2021-01-13 NOTE — PROGRESS NOTES
Occupational Therapy  OT BEDSIDE TREATMENT NOTE      Date:2021  Patient Name: Cassi Braswell  MRN: [id number]  : 1960  Room: [address] [place] [address]-A     Referring Provider: Monica Navarrete         Evaluating OT: Nicci Chakraborty OTR/L [id number]     AM-PAC Daily Activity Raw Score:      Recommended Adaptive Equipment:  TBD      Diagnosis: Pneumonia, COVID 19     Pertinent Medical History: bipolar disorder, seizure   Precautions:  Falls, alarm, O2, DROPLET PLUS      Home Living: Pt lives at group home,  Bed/bath on 2nd floor   Independent with ADL, no device for mobility       Pain Level: Pt did not report pain during session. Cognition: Awake. Flat affect. Slow to respond. Moderate cues for safety. Memory:  Fair               Sequencing:  Fair               Problem solving:  Fair               Judgement/safety:  fair                Functional Assessment:    Initial Eval Status  Date: 21 Treatment Status   STGs = LTGs  Time frame: 7-10 days   Feeding Independent       Grooming Set-up  Min  A   Independent   UB Dressing Set-up  Min  A to change gown.   Independent    LB Dressing Min A  Able to doff sock, assist donning  Pt able to doff soiled socks.  Assist to don clean socks over feet.   Mod I    Bathing Min A LB bathing min  A   Mod I    Toileting Independent  Pt completed toilet hygiene however cues for thoroughness to wash feces from back of legs.       Bed Mobility  Independent   Supine <> sit  Supervision       Functional Transfers SBA  Sit-stand from bed, chair CGA and cues for safety  Mod  I   Functional Mobility CGA, no device  Ambulating in room   2 L O2   No signs or complaints of SOB  O2 sats in 90s  CGA HHA and assist to manage O2 line safely   Mod I with good tolerance    Balance Sitting:     Static:  Independent     Dynamic:SBA   Standing: CGA    Independent  with good tolerance    Activity Tolerance Fair with light activity  Fair-   Good with ADL activity        Comments:  Pt found laying in the bed. Condom catheter off and on the floor. On the floor in the bathroom was a soiled brief and a large puddle of diarrhea. Pt sat to the side of the bed to bathe LE's and change gown and socks. Upon sitting to the side of the bed, pt IV found under his scrotum. Pt assisted to chair to eat his lunch. Nursing notified of IV. Pt remained in chair with alarm activated. Education/treatment:  ADL retraining with facilitation of movement to increase self care. Therapeutic activity to address balance, strength, and endurance. Pt education of transfer safety and daily orientation. · Pt has made  progress towards set goals.    Plan of Care: 1-3 days/week for 1-2 weeks PRN   [x]? ?ADL retraining/adapted techniques and AE recommendations to increase functional independence within precautions                    [x]? ? Energy conservation techniques to improve tolerance for selfcare routine   [x]? ? Functional transfer/mobility training/DME recommendations for increased independence, safety and fall prevention         [x]? ?Patient/family education to increase safety and functional independence             [x]? ? Environmental modifications for safe mobility and completion of ADLs                             []? ? Cognitive retraining ex's to improve problem solving skills & safe participation in ADLs/IADLs     []? ?Sensory re-education techniques to improve extremity awareness, maintain skin integrity and improve hand function                             []? ? Visual/Perceptual retraining  to improve body awareness and safety during transfers and ADLs  []? ? Splinting/positioning needs to maintain joint/skin integrity and prevent contractures  [x]? ? Therapeutic activity to improve functional performance during ADLs.                                         [x]? ? Therapeutic exercise to improve tolerance and functional strength for ADLs   [x]? ? Balance retraining/tolerance tasks for facilitation of postural control with dynamic challenges during ADLs .  []?? Neuromuscular re-education: facilitation of righting/equilibrium reactions, midline orientation, scapular stability/mobility, Normalization muscle     tone and facilitation active functional movement/Attention                         []? ? Delirium prevention/treatment    [x]? Positioning to improve functional independence  []? ? Other:     Time In: 1:35  Time Out: 2:13     Min Units   Therapeutic Ex 10863     Therapeutic Activities [id number] 8 1   ADL/Self Care 40170 30 2   Orthotic Management 49904     Neuro Re-Ed 45403     Non-Billable Time     TOTAL TIMED TREATMENT 38 1239 [facility] PA/L [id number]

## 2021-01-13 NOTE — PROGRESS NOTES
Spoke with pharmacy in regards to elevated ALT/AST. Per pharmacy, okay to give Remdesevir until enzymes reach > 200.     Electronically signed by Luz Rosales RN on 1/13/2021 at 5:13 PM

## 2021-01-13 NOTE — CARE COORDINATION
Spoke to the patient's nurse from [facility] [phone]). Provided update on clinical status including but not limited to O2 use, pulse ox, pt/ot ampac scores, medications ordered. Indicated to [facility] that dc would be anticipated in the 48 hour range. She voiced appreciation for the information and stated she would appreciate as much notice of discharge to ensure O2, DME, staffing are in place. Communicated that best estimate is a discharge on Friday 1/15/21.      1355  Spoke to the patient's nurse from [facility] [phone]). Informed her of Dr. Gerard Pat notes indicating potential discharge for 1/14/21. She voiced understanding and appreciation of the information. Helena Reyes.  Gina, MSN, RN  [facility] Case Management  [phone]

## 2021-01-13 NOTE — PROGRESS NOTES
Pt pulled out IV and removed liberty cath. IV access attempted x4 unsuccessfully. IV team consulted. [address] cath not replaced but supplies in room. Pt reports there were bugs on his sandwich and that's why he did not eat it from his lunch. No bugs noticed on food, table, or in the vicinity.

## 2021-01-14 LAB
ALBUMIN SERPL-MCNC: 3.1 G/DL (ref 3.5–5.2)
ALP BLD-CCNC: 84 U/L (ref 40–129)
ALT SERPL-CCNC: 124 U/L (ref 0–40)
ANION GAP SERPL CALCULATED.3IONS-SCNC: 10 MMOL/L (ref 7–16)
AST SERPL-CCNC: 97 U/L (ref 0–39)
ATYPICAL LYMPHOCYTE RELATIVE PERCENT: 11 % (ref 0–4)
BASOPHILS ABSOLUTE: 0 E9/L (ref 0–0.2)
BASOPHILS RELATIVE PERCENT: 0 % (ref 0–2)
BILIRUB SERPL-MCNC: 0.4 MG/DL (ref 0–1.2)
BUN BLDV-MCNC: 15 MG/DL (ref 6–20)
CALCIUM SERPL-MCNC: 9 MG/DL (ref 8.6–10.2)
CHLORIDE BLD-SCNC: 106 MMOL/L (ref 98–107)
CO2: 23 MMOL/L (ref 22–29)
CREAT SERPL-MCNC: 0.9 MG/DL (ref 0.7–1.2)
EOSINOPHILS ABSOLUTE: 0 E9/L (ref 0.05–0.5)
EOSINOPHILS RELATIVE PERCENT: 0 % (ref 0–6)
GFR AFRICAN AMERICAN: >60
GFR NON-AFRICAN AMERICAN: >60 ML/MIN/1.73
GLUCOSE BLD-MCNC: 173 MG/DL (ref 74–99)
HCT VFR BLD CALC: 42.5 % (ref 37–54)
HEMOGLOBIN: 14.1 G/DL (ref 12.5–16.5)
LYMPHOCYTES ABSOLUTE: 1.75 E9/L (ref 1.5–4)
LYMPHOCYTES RELATIVE PERCENT: 14 % (ref 20–42)
MCH RBC QN AUTO: 30.8 PG (ref 26–35)
MCHC RBC AUTO-ENTMCNC: 33.2 % (ref 32–34.5)
MCV RBC AUTO: 92.8 FL (ref 80–99.9)
METAMYELOCYTES RELATIVE PERCENT: 1 % (ref 0–1)
MONOCYTES ABSOLUTE: 0.42 E9/L (ref 0.1–0.95)
MONOCYTES RELATIVE PERCENT: 6 % (ref 2–12)
NEUTROPHILS ABSOLUTE: 4.83 E9/L (ref 1.8–7.3)
NEUTROPHILS RELATIVE PERCENT: 68 % (ref 43–80)
PDW BLD-RTO: 12.5 FL (ref 11.5–15)
PLATELET # BLD: 319 E9/L (ref 130–450)
PMV BLD AUTO: 10.5 FL (ref 7–12)
POTASSIUM REFLEX MAGNESIUM: 3.8 MMOL/L (ref 3.5–5)
RBC # BLD: 4.58 E12/L (ref 3.8–5.8)
RBC # BLD: NORMAL 10*6/UL
SARS-COV-2: DETECTED
SODIUM BLD-SCNC: 139 MMOL/L (ref 132–146)
SOURCE: ABNORMAL
TOTAL PROTEIN: 7.4 G/DL (ref 6.4–8.3)
WBC # BLD: 7 E9/L (ref 4.5–11.5)

## 2021-01-14 PROCEDURE — 36415 COLL VENOUS BLD VENIPUNCTURE: CPT

## 2021-01-14 PROCEDURE — 80053 COMPREHEN METABOLIC PANEL: CPT

## 2021-01-14 PROCEDURE — 6360000002 HC RX W HCPCS: Performed by: INTERNAL MEDICINE

## 2021-01-14 PROCEDURE — 2500000003 HC RX 250 WO HCPCS: Performed by: INTERNAL MEDICINE

## 2021-01-14 PROCEDURE — 85025 COMPLETE CBC W/AUTO DIFF WBC: CPT

## 2021-01-14 PROCEDURE — 6370000000 HC RX 637 (ALT 250 FOR IP): Performed by: INTERNAL MEDICINE

## 2021-01-14 PROCEDURE — 2060000000 HC ICU INTERMEDIATE R&B

## 2021-01-14 PROCEDURE — 97530 THERAPEUTIC ACTIVITIES: CPT

## 2021-01-14 PROCEDURE — 2580000003 HC RX 258: Performed by: INTERNAL MEDICINE

## 2021-01-14 PROCEDURE — 2700000000 HC OXYGEN THERAPY PER DAY

## 2021-01-14 RX ORDER — SODIUM CHLORIDE 9 MG/ML
INJECTION, SOLUTION INTRAVENOUS CONTINUOUS
Status: DISCONTINUED | OUTPATIENT
Start: 2021-01-14 | End: 2021-01-15 | Stop reason: HOSPADM

## 2021-01-14 RX ADMIN — FAMOTIDINE 20 MG: 20 TABLET, FILM COATED ORAL at 20:26

## 2021-01-14 RX ADMIN — MEMANTINE HYDROCHLORIDE 10 MG: 10 TABLET, FILM COATED ORAL at 20:26

## 2021-01-14 RX ADMIN — SODIUM CHLORIDE: 9 INJECTION, SOLUTION INTRAVENOUS at 20:26

## 2021-01-14 RX ADMIN — ENOXAPARIN SODIUM 40 MG: 40 INJECTION SUBCUTANEOUS at 08:46

## 2021-01-14 RX ADMIN — TOPIRAMATE 100 MG: 100 TABLET, FILM COATED ORAL at 08:46

## 2021-01-14 RX ADMIN — ENOXAPARIN SODIUM 40 MG: 40 INJECTION SUBCUTANEOUS at 20:24

## 2021-01-14 RX ADMIN — SODIUM CHLORIDE: 9 INJECTION, SOLUTION INTRAVENOUS at 10:14

## 2021-01-14 RX ADMIN — TOPIRAMATE 100 MG: 100 TABLET, FILM COATED ORAL at 20:26

## 2021-01-14 RX ADMIN — RIVASTIGMINE TARTRATE 6 MG: 3 CAPSULE ORAL at 08:46

## 2021-01-14 RX ADMIN — ATORVASTATIN CALCIUM 10 MG: 10 TABLET, FILM COATED ORAL at 20:26

## 2021-01-14 RX ADMIN — METOCLOPRAMIDE 5 MG: 10 TABLET ORAL at 20:26

## 2021-01-14 RX ADMIN — PHENOBARBITAL 64.8 MG: 32.4 TABLET ORAL at 08:46

## 2021-01-14 RX ADMIN — ACETAMINOPHEN 650 MG: 325 TABLET ORAL at 00:33

## 2021-01-14 RX ADMIN — PHENOBARBITAL 64.8 MG: 32.4 TABLET ORAL at 20:25

## 2021-01-14 RX ADMIN — MICONAZOLE NITRATE: 20 POWDER TOPICAL at 08:46

## 2021-01-14 RX ADMIN — METOCLOPRAMIDE 5 MG: 10 TABLET ORAL at 14:15

## 2021-01-14 RX ADMIN — CLONAZEPAM 0.5 MG: 0.5 TABLET ORAL at 20:25

## 2021-01-14 RX ADMIN — RIVASTIGMINE TARTRATE 6 MG: 3 CAPSULE ORAL at 20:25

## 2021-01-14 RX ADMIN — SODIUM CHLORIDE, PRESERVATIVE FREE 10 ML: 5 INJECTION INTRAVENOUS at 00:23

## 2021-01-14 RX ADMIN — DEXAMETHASONE SODIUM PHOSPHATE 6 MG: 4 INJECTION, SOLUTION INTRAMUSCULAR; INTRAVENOUS at 20:25

## 2021-01-14 RX ADMIN — CARIPRAZINE 3 MG: 1.5 CAPSULE, GELATIN COATED ORAL at 20:25

## 2021-01-14 RX ADMIN — Medication 2000 UNITS: at 08:46

## 2021-01-14 RX ADMIN — CLONAZEPAM 0.5 MG: 0.5 TABLET ORAL at 08:46

## 2021-01-14 RX ADMIN — MICONAZOLE NITRATE: 20 POWDER TOPICAL at 20:27

## 2021-01-14 RX ADMIN — METOCLOPRAMIDE 5 MG: 10 TABLET ORAL at 10:14

## 2021-01-14 RX ADMIN — Medication 100 MG: at 08:46

## 2021-01-14 RX ADMIN — ZINC SULFATE 220 MG (50 MG) CAPSULE 50 MG: CAPSULE at 08:45

## 2021-01-14 RX ADMIN — PETROLATUM: 420 OINTMENT TOPICAL at 08:46

## 2021-01-14 RX ADMIN — MEMANTINE HYDROCHLORIDE 10 MG: 10 TABLET, FILM COATED ORAL at 08:46

## 2021-01-14 RX ADMIN — FAMOTIDINE 20 MG: 20 TABLET, FILM COATED ORAL at 08:46

## 2021-01-14 RX ADMIN — PHENOBARBITAL 64.8 MG: 32.4 TABLET ORAL at 14:15

## 2021-01-14 RX ADMIN — REMDESIVIR 100 MG: 100 INJECTION, POWDER, LYOPHILIZED, FOR SOLUTION INTRAVENOUS at 20:24

## 2021-01-14 ASSESSMENT — PAIN SCALES - GENERAL
PAINLEVEL_OUTOF10: 0

## 2021-01-14 NOTE — CARE COORDINATION
LUIS F NOTE: Per QFR----covid positive from [facility]. Hypotensive & in need of IVF bolus along with last dose IV RDV. IV team notified to place IV site after site lost last pm per note by pt's nurse. LUIS F spoke with Adolfo Paz from [facility] yesterday to provide update that pt may discharge back to [facility] today. CM & SW are following pending progress with IVF & IV med. CM & SW met with PCP to discuss current plan of care & transition of care. Will place IV site & give IVF, IV RDV. Possible discharge next 24hrs. LUIS F updated Macy @ [facility].

## 2021-01-14 NOTE — PROGRESS NOTES
Physical Therapy    Facility/Department: [facility] MED SURG  Treatment note    NAME: Mark Burnette  : 1960  MRN: [id number]    Date of Service: 2021      Patient Diagnosis(es): There were no encounter diagnoses. has a past medical history of Bipolar 1 disorder ([facility] [address] Depression, GERD (gastroesophageal reflux disease), Hydrocephaly ([facility] [address] and Seizure disorder ([facility] has no past surgical history on file. Referring Provider:  Karin Pemberton DO      Evaluating Therapist: Sparkle Cruz PT    Room #:610  DIAGNOSIS: Covid  Additional Pertinent History:Bipolar, Hydrocephaly, Seizure disorder  PRECAUTIONS: falls, O2, droplet plus isolation, alarm    Social:  Pt admitted from group home. Independent without deivce. Initial Evaluation  Date: 21 Treatment  21    Short Term/ Long Term   Goals   Was pt agreeable to Eval/treatment? yes yes    Does pt have pain? No c/o pain No complaints    Bed Mobility  Rolling: independent  Supine to sit: independent  Sit to supine: NT  Scooting: independent Sit to supine: SBA  Scooting: Min A side to side in bed independent   Transfers Sit to stand: SBA  Stand to sit: SBA  Stand pivot: SBA Sit to stand; SBA  Stand to sit: SBA   independent   Ambulation    20 feet with no devcie with CGA 40 feet x 1 without A/D Min/CGA for balance 75 feet with no device independent   Stair Negotiation  Ascended and descended  NT secondary to isolation   TBA   LE strength     4-/5    4./5   balance      Fair+     AM-PAC Raw score                         Pt is alert and able to follow instruction, slow to respond  Balance: fair minus/poor dynamic without A//D    ASSESSMENT:   Comments: Pt found in the bathroom, stood at the sink SBA while washing hands, assist given for hygiene care. Gait then performed, Pt unsteady, noted mild LE instability. Pt in need of constant hands on assist for balance and safety, presently unsafe to gait alone.   Pt on 2 L 02 NC all rx,

## 2021-01-14 NOTE — PROGRESS NOTES
Physician Progress Note      Carine Chua  CSN #:                  [id number]  :                       1960  ADMIT DATE:       2021 2:24 PM  [address] DATE:  RESPONDING  PROVIDER #:        Shahab JOSHUA DO          QUERY TEXT:    Pt admitted with COVID-19 viral pneumonia . Pt noted to have Fever,   tachycardia elevated procalcitonin, ESR and CRP. If possible, please document   in the progress notes and discharge summary if you are evaluating and /or   treating any of the following: The medical record reflects the following:  Risk Factors: COVID-19 viral pneumonia  Clinical Indicators: On admission T 102.2, , RR 38, procalcitonin 0.23,   CRP 23.6, ESR 54, respiratory failure  Treatment: Remdesivir, Vitamin D, Zinc, oxygen      Thank you,  Josseline Galvan RN, CCDS  Clinical Documentation Improvement Specialist  [email or web address]  Options provided:  -- Viral Sepsis, present on admission  -- Viral Sepsis, not present on admission  -- No Sepsis, (specify localized infection such as pneumonia or cellulitis)   only  -- Sepsis was ruled out  -- Other - I will add my own diagnosis  -- Disagree - Not applicable / Not valid  -- Disagree - Clinically unable to determine / Unknown  -- Refer to Clinical Documentation Reviewer    PROVIDER RESPONSE TEXT:    This patient has Viral Sepsis (organism, if known) which was present on   admission.     Query created by: Randal Rapp on 2021 10:44 AM      Electronically signed by:  Amaya Green DO 2021 7:21 AM

## 2021-01-14 NOTE — PROGRESS NOTES
Internal Medicine Progress Note    Patient's name: Solomon éPrez  : 1960  Admission date: 2021  Date of service: 2021   Room: [facility]  Primary care physician: Ashley Foreman MD  Reason for visit: Follow-up for COVID-19    Subjective  Bijal Champagne was seen and examined at bedside. He was resting comfortably in his room. He denied complaints. He was wearing oxygen. Breathing is stable. He denies new issues or problems. Review of Systems  There are no new complaints of chest pain, shortness of breath, abdominal pain, nausea, vomiting, diarrhea, constipation.     Hospital Medications  Current Facility-Administered Medications   Medication Dose Route Frequency Provider Last Rate Last Admin    0.9 % sodium chloride infusion   Intravenous Continuous Jeff Christensen,  mL/hr at 21 1014 New Bag at 21 1014    metoclopramide (REGLAN) tablet 5 mg  5 mg Oral 4x Daily Jeff Christensen, DO   5 mg at 21 1415    Petrolatum 42 % OINT   Topical BID PRN Jeff Gaminoino, DO   Given at 21 0846    miconazole (MICOTIN) 2 % powder   Topical BID Jeff Gaminoino, DO   Given at 21 0846    thiamine tablet 100 mg  100 mg Oral Daily Jeff Gaminoino, DO   100 mg at 21 0846    Vitamin D (CHOLECALCIFEROL) tablet 2,000 Units  2,000 Units Oral Daily Jeff Christensen, DO   2,000 Units at 21 0846    zinc sulfate (ZINCATE) capsule 50 mg  50 mg Oral Daily Jeff Christensen, DO   50 mg at 21 0845    HYDROcodone-homatropine (HYCODAN) 5-1.5 MG/5ML syrup 5 mL  5 mL Oral Q4H PRN Jeff Gaminoino, DO        dexamethasone (DECADRON) injection 6 mg  6 mg Intravenous Daily Jeff Christensen, DO   6 mg at 21 2345    benzonatate (TESSALON) capsule 100 mg  100 mg Oral TID PRN Jeff Christensen, DO   100 mg at 21 0849    albuterol sulfate  (90 Base) MCG/ACT inhaler 2 puff  2 puff Inhalation Q6H PRN Jeff Christensen, DO        sodium chloride flush 0.9 % injection 10 mL  10 ophthalmic solution 1 drop  1 drop Both Eyes PRN Jeff Christensen DO        remdesivir 100 mg in sodium chloride 0.9 % 250 mL IVPB  100 mg Intravenous Q24H Jeff Christensen DO   Stopped at 01/14/21 0512    0.9 % sodium chloride bolus  30 mL Intravenous PRN Jeff Christensen DO           PRN Medications  Petrolatum, HYDROcodone-homatropine, benzonatate, albuterol sulfate HFA, sodium chloride flush, potassium chloride **OR** potassium alternative oral replacement **OR** potassium chloride, promethazine **OR** ondansetron, senna, acetaminophen **OR** acetaminophen, polyvinyl alcohol, sodium chloride    Objective  Most Recent Recorded Vitals  BP 90/67   Pulse 93   Temp 98 °F (36.7 °C) (Oral)   Resp 20   Ht 6' (1.829 m)   Wt 216 lb (98 kg)   SpO2 93%   BMI 29.29 kg/m²   I/O last 3 completed shifts: In: 543 [I.V.:543]  Out: 300 [Urine:300]  No intake/output data recorded. Physical Exam:  General: AAO to person/place/time/purpose, NAD, no labored breathing  Eyes: conjunctivae/corneas clear, sclera non icteric  Skin: color/texture/turgor normal, no rashes or lesions  Lungs: Diminished but CTAB, no retractions/use of accessory muscles, no vocal fremitus, no rhonchi, no crackle, no rales  Heart: regular rate, regular rhythm, no murmur  Abdomen: soft, NT; bowel sounds normal; no masses,  no organomegaly  Extremities: atraumatic, no cyanosis, no edema  Neurologic: cranial nerves 2-12 grossly intact, no slurred speech. Most Recent Labs  Lab Results   Component Value Date    WBC 7.0 01/14/2021    HGB 14.1 01/14/2021    HCT 42.5 01/14/2021     01/14/2021     01/14/2021    K 3.8 01/14/2021     01/14/2021    CREATININE 0.9 01/14/2021    BUN 15 01/14/2021    CO2 23 01/14/2021    GLUCOSE 173 (H) 01/14/2021     (H) 01/14/2021    AST 97 (H) 01/14/2021    INR 1.4 01/12/2021    LABA1C 5.5 10/22/2020       CTA PULMONARY W CONTRAST   Final Result   No evidence of pulmonary embolism.    Bilateral patchy areas of ground-glass appearance mostly peripheral based   consistent with pneumonia. COVID pneumonia suspected. XR CHEST PORTABLE   Final Result   Pulmonary vascular congestion/early CHF. Assessment   Active Hospital Problems    Diagnosis    Pneumonia due to COVID-19 virus [U07.1, J12.82]     Priority: High    Acute respiratory failure with hypoxia (HCC) [J96.01]     Priority: Medium    Seizure disorder ([facility] [address] [G40.909]    Hydrocephaly (HCC) [G91.9]    GERD (gastroesophageal reflux disease) [K21.9]    Depression [F32.9]    Bipolar 1 disorder ([facility]) [F31.9]         Plan  · COVID-19 viral pneumonia w/ associated acute hypoxic respiratory failure: Contact/droplet isolation. Remdesivir per pharmacy. Decadron. [place] as able. Tylenol prn fevers or myalgias. Self proning as able. BID Lovenox. Vitamin C/thiamine/Zinc/VitD/Pepcid. Check ambulatory pulse ox. · Relative hypotension: IVF. Consider Midodrine. Check orthostatic BP's-- neg. · Transaminitis: likely related to COVID or REMN. Continue to monitor. PT/OT-- 20/24  · ANA maher  · Follow labs  · DVT prophylaxis. · Please see orders for further management and care. ·  for discharge planning  · Discharge plan: Likely discharge back to group home tomorrow     Electronically signed by Kayla Suh DO on 1/14/2021 at 3:39 PM    I can be reached through [employer].

## 2021-01-15 VITALS
RESPIRATION RATE: 17 BRPM | OXYGEN SATURATION: 94 % | HEART RATE: 93 BPM | SYSTOLIC BLOOD PRESSURE: 110 MMHG | BODY MASS INDEX: 29.26 KG/M2 | WEIGHT: 216 LBS | HEIGHT: 72 IN | TEMPERATURE: 96.6 F | DIASTOLIC BLOOD PRESSURE: 70 MMHG

## 2021-01-15 LAB
ALBUMIN SERPL-MCNC: 2.7 G/DL (ref 3.5–5.2)
ALP BLD-CCNC: 93 U/L (ref 40–129)
ALT SERPL-CCNC: 92 U/L (ref 0–40)
ANION GAP SERPL CALCULATED.3IONS-SCNC: 8 MMOL/L (ref 7–16)
AST SERPL-CCNC: 83 U/L (ref 0–39)
ATYPICAL LYMPHOCYTE RELATIVE PERCENT: 8 % (ref 0–4)
BASOPHILS ABSOLUTE: 0 E9/L (ref 0–0.2)
BASOPHILS RELATIVE PERCENT: 0 % (ref 0–2)
BILIRUB SERPL-MCNC: 0.2 MG/DL (ref 0–1.2)
BUN BLDV-MCNC: 12 MG/DL (ref 6–20)
CALCIUM SERPL-MCNC: 8 MG/DL (ref 8.6–10.2)
CHLORIDE BLD-SCNC: 114 MMOL/L (ref 98–107)
CO2: 22 MMOL/L (ref 22–29)
CREAT SERPL-MCNC: 0.9 MG/DL (ref 0.7–1.2)
EOSINOPHILS ABSOLUTE: 0 E9/L (ref 0.05–0.5)
EOSINOPHILS RELATIVE PERCENT: 0 % (ref 0–6)
GFR AFRICAN AMERICAN: >60
GFR NON-AFRICAN AMERICAN: >60 ML/MIN/1.73
GLUCOSE BLD-MCNC: 109 MG/DL (ref 74–99)
HCT VFR BLD CALC: 38.6 % (ref 37–54)
HEMOGLOBIN: 12.4 G/DL (ref 12.5–16.5)
LYMPHOCYTES ABSOLUTE: 1.83 E9/L (ref 1.5–4)
LYMPHOCYTES RELATIVE PERCENT: 23 % (ref 20–42)
MCH RBC QN AUTO: 30.2 PG (ref 26–35)
MCHC RBC AUTO-ENTMCNC: 32.1 % (ref 32–34.5)
MCV RBC AUTO: 93.9 FL (ref 80–99.9)
MONOCYTES ABSOLUTE: 0.53 E9/L (ref 0.1–0.95)
MONOCYTES RELATIVE PERCENT: 9 % (ref 2–12)
NEUTROPHILS ABSOLUTE: 3.54 E9/L (ref 1.8–7.3)
NEUTROPHILS RELATIVE PERCENT: 60 % (ref 43–80)
PDW BLD-RTO: 12.7 FL (ref 11.5–15)
PLATELET # BLD: 171 E9/L (ref 130–450)
PMV BLD AUTO: 11.2 FL (ref 7–12)
POTASSIUM REFLEX MAGNESIUM: 3.6 MMOL/L (ref 3.5–5)
RBC # BLD: 4.11 E12/L (ref 3.8–5.8)
RBC # BLD: NORMAL 10*6/UL
SODIUM BLD-SCNC: 144 MMOL/L (ref 132–146)
TOTAL PROTEIN: 6.1 G/DL (ref 6.4–8.3)
WBC # BLD: 5.9 E9/L (ref 4.5–11.5)

## 2021-01-15 PROCEDURE — 2580000003 HC RX 258: Performed by: INTERNAL MEDICINE

## 2021-01-15 PROCEDURE — 97535 SELF CARE MNGMENT TRAINING: CPT

## 2021-01-15 PROCEDURE — 97530 THERAPEUTIC ACTIVITIES: CPT

## 2021-01-15 PROCEDURE — 6360000002 HC RX W HCPCS: Performed by: INTERNAL MEDICINE

## 2021-01-15 PROCEDURE — 85025 COMPLETE CBC W/AUTO DIFF WBC: CPT

## 2021-01-15 PROCEDURE — 2700000000 HC OXYGEN THERAPY PER DAY

## 2021-01-15 PROCEDURE — 6370000000 HC RX 637 (ALT 250 FOR IP): Performed by: INTERNAL MEDICINE

## 2021-01-15 PROCEDURE — 2500000003 HC RX 250 WO HCPCS: Performed by: INTERNAL MEDICINE

## 2021-01-15 PROCEDURE — 80053 COMPREHEN METABOLIC PANEL: CPT

## 2021-01-15 RX ORDER — BENZONATATE 100 MG/1
100 CAPSULE ORAL 3 TIMES DAILY PRN
Qty: 21 CAPSULE | Refills: 0 | Status: SHIPPED | OUTPATIENT
Start: 2021-01-15 | End: 2021-01-22

## 2021-01-15 RX ORDER — LANOLIN ALCOHOL/MO/W.PET/CERES
100 CREAM (GRAM) TOPICAL DAILY
Qty: 30 TABLET | Refills: 0 | Status: SHIPPED | OUTPATIENT
Start: 2021-01-16 | End: 2022-02-10

## 2021-01-15 RX ORDER — ALBUTEROL SULFATE 90 UG/1
2 AEROSOL, METERED RESPIRATORY (INHALATION) EVERY 6 HOURS PRN
Qty: 1 INHALER | Refills: 0 | Status: SHIPPED | OUTPATIENT
Start: 2021-01-15 | End: 2022-02-10

## 2021-01-15 RX ORDER — ZINC SULFATE 50(220)MG
50 CAPSULE ORAL DAILY
Qty: 30 CAPSULE | Refills: 0 | COMMUNITY
Start: 2021-01-16 | End: 2022-02-10

## 2021-01-15 RX ADMIN — REMDESIVIR 100 MG: 100 INJECTION, POWDER, LYOPHILIZED, FOR SOLUTION INTRAVENOUS at 15:59

## 2021-01-15 RX ADMIN — MEMANTINE HYDROCHLORIDE 10 MG: 10 TABLET, FILM COATED ORAL at 11:21

## 2021-01-15 RX ADMIN — PHENOBARBITAL 64.8 MG: 32.4 TABLET ORAL at 13:56

## 2021-01-15 RX ADMIN — CLONAZEPAM 0.5 MG: 0.5 TABLET ORAL at 11:20

## 2021-01-15 RX ADMIN — MICONAZOLE NITRATE: 20 POWDER TOPICAL at 10:00

## 2021-01-15 RX ADMIN — TOPIRAMATE 100 MG: 100 TABLET, FILM COATED ORAL at 11:21

## 2021-01-15 RX ADMIN — METOCLOPRAMIDE 5 MG: 10 TABLET ORAL at 15:01

## 2021-01-15 RX ADMIN — METOCLOPRAMIDE 5 MG: 10 TABLET ORAL at 11:20

## 2021-01-15 RX ADMIN — ENOXAPARIN SODIUM 40 MG: 40 INJECTION SUBCUTANEOUS at 11:20

## 2021-01-15 RX ADMIN — RIVASTIGMINE TARTRATE 6 MG: 3 CAPSULE ORAL at 11:21

## 2021-01-15 RX ADMIN — Medication 100 MG: at 11:21

## 2021-01-15 RX ADMIN — ZINC SULFATE 220 MG (50 MG) CAPSULE 50 MG: CAPSULE at 11:21

## 2021-01-15 RX ADMIN — Medication 2000 UNITS: at 11:20

## 2021-01-15 RX ADMIN — PHENOBARBITAL 64.8 MG: 32.4 TABLET ORAL at 11:45

## 2021-01-15 RX ADMIN — FAMOTIDINE 20 MG: 20 TABLET, FILM COATED ORAL at 11:20

## 2021-01-15 RX ADMIN — SODIUM CHLORIDE, PRESERVATIVE FREE 10 ML: 5 INJECTION INTRAVENOUS at 11:21

## 2021-01-15 ASSESSMENT — PAIN SCALES - GENERAL: PAINLEVEL_OUTOF10: 0

## 2021-01-15 NOTE — CARE COORDINATION
LUIS F NOTE: Per QFR---covid positive from group home. Checking need for oxygen at discharge. Per nurse---did not qualify for home O2. LUIS F spoke with Cyn Casillas at [facility] to update that pt is being discharged back to home & does not require home O2. PER SYDNEE @ [facility]---have pt's nurse contact [facility] for nurse to nurse report at [phone]. IF ANY NEW MEDS ORDERED---WILL NEED THEM CALLED TO/ OR E-SCRIBED [facility].  Cyn Casillas states pt will be picked up at 4pm. LUIS F updated Leroy Fuentes (pt's nurse) & EUGENIE.

## 2021-01-15 NOTE — PROGRESS NOTES
Placed all scripts on hold. Patient resides in a group home.  talked to RN, Valerie Parker and told her all scripts have to be sent to the [address] [place] [address]

## 2021-01-15 NOTE — PROGRESS NOTES
Internal Medicine Progress Note    Patient's name: Hair Peacock  : 1960  Admission date: 2021  Date of service: 1/15/2021   Room: [facility]  Primary care physician: Melina Collazo MD  Reason for visit: Follow-up for COVID-19    Subjective  Severo Hayden was seen and examined at bedside. He was resting comfortably watching television. He did not have oxygen on when I saw him. He feels well. He is tolerating all of his medications. He is due for his fifth dose of remdesivir today. He wants to go back to his group home. He is not short of breath. Review of Systems  There are no new complaints of chest pain, shortness of breath, abdominal pain, nausea, vomiting, diarrhea, constipation.     Hospital Medications  Current Facility-Administered Medications   Medication Dose Route Frequency Provider Last Rate Last Admin    0.9 % sodium chloride infusion   Intravenous Continuous Jeff Christensen,  mL/hr at 21 New Bag at 21    metoclopramide (REGLAN) tablet 5 mg  5 mg Oral 4x Daily Jeff Gaminoino, DO   5 mg at 01/15/21 1120    Petrolatum 42 % OINT   Topical BID PRN Jeff Gaminoino, DO   Given at 21 0846    miconazole (MICOTIN) 2 % powder   Topical BID Jeff DEAL Volino, DO   Given at 01/15/21 1000    thiamine tablet 100 mg  100 mg Oral Daily Jeff Gaminoino, DO   100 mg at 01/15/21 1121    Vitamin D (CHOLECALCIFEROL) tablet 2,000 Units  2,000 Units Oral Daily Jeff Gaminoino, DO   2,000 Units at 01/15/21 1120    zinc sulfate (ZINCATE) capsule 50 mg  50 mg Oral Daily Jeff Gaminoino, DO   50 mg at 01/15/21 1121    HYDROcodone-homatropine (HYCODAN) 5-1.5 MG/5ML syrup 5 mL  5 mL Oral Q4H PRN Jeff Gaminoino, DO        dexamethasone (DECADRON) injection 6 mg  6 mg Intravenous Daily Jeff Gaminoino, DO   6 mg at 21    benzonatate (TESSALON) capsule 100 mg  100 mg Oral TID PRN Jeff Christensen, DO   100 mg at 21 0849    albuterol sulfate  (90 Base) MCG/ACT inhaler 2 puff  2 puff Inhalation Q6H PRN Jeff E Volino, DO        sodium chloride flush 0.9 % injection 10 mL  10 mL Intravenous 2 times per day Jeff E Volino, DO   10 mL at 01/15/21 1121    sodium chloride flush 0.9 % injection 10 mL  10 mL Intravenous PRN Jeff E Volino, DO        potassium chloride (KLOR-CON M) extended release tablet 40 mEq  40 mEq Oral PRN Jeff Gaminoino, DO        Or    potassium bicarb-citric acid (EFFER-K) effervescent tablet 40 mEq  40 mEq Oral PRN Jeff E Volino, DO        Or    potassium chloride 10 mEq/100 mL IVPB (Peripheral Line)  10 mEq Intravenous PRN Jeff Gaminoino, DO        enoxaparin (LOVENOX) injection 40 mg  40 mg Subcutaneous BID Jeff TOYIN Volino, DO   40 mg at 01/15/21 1120    promethazine (PHENERGAN) tablet 12.5 mg  12.5 mg Oral Q6H PRN Jeff Christensen, DO        Or    ondansetron (ZOFRAN) injection 4 mg  4 mg Intravenous Q6H PRN Jeff Christensen, DO        senna (SENOKOT) tablet 8.6 mg  1 tablet Oral Daily PRN Jeff Gaminoino, DO        famotidine (PEPCID) tablet 20 mg  20 mg Oral BID Jeff Christensen, DO   20 mg at 01/15/21 1120    acetaminophen (TYLENOL) tablet 650 mg  650 mg Oral Q6H PRN Jeff TOYIN Hannyino, DO   650 mg at 01/14/21 0033    Or    acetaminophen (TYLENOL) suppository 650 mg  650 mg Rectal Q6H PRN Jeff Gaminoino, DO        cariprazine hcl (VRAYLAR) capsule 3 mg  3 mg Oral Nightly Jeff DEAL Volino, DO   3 mg at 01/14/21 2025    clonazePAM (KLONOPIN) tablet 0.5 mg  0.5 mg Oral BID Jeff Gaminoino, DO   0.5 mg at 01/15/21 1120    memantine (NAMENDA) tablet 10 mg  10 mg Oral BID Jeff Gaminoino, DO   10 mg at 01/15/21 1121    PHENobarbital (LUMINAL) tablet 64.8 mg  64.8 mg Oral TID Jeff Christensen, DO   64.8 mg at 01/15/21 1145    rivastigmine (EXELON) capsule 6 mg  6 mg Oral BID Jeff Christensen DO   6 mg at 01/15/21 1121    topiramate (TOPAMAX) tablet 100 mg  100 mg Oral BID Jeff Christensen DO   100 mg at 01/15/21 1121    atorvastatin (LIPITOR) 01/15/2021    AST 83 (H) 01/15/2021    INR 1.4 01/12/2021    LABA1C 5.5 10/22/2020       CTA PULMONARY W CONTRAST   Final Result   No evidence of pulmonary embolism. Bilateral patchy areas of ground-glass appearance mostly peripheral based   consistent with pneumonia. COVID pneumonia suspected. XR CHEST PORTABLE   Final Result   Pulmonary vascular congestion/early CHF. Assessment   Active Hospital Problems    Diagnosis    Pneumonia due to COVID-19 virus [U07.1, J12.82]     Priority: High    Acute respiratory failure with hypoxia ([facility]) [J96.01]     Priority: Medium    Seizure disorder ([facility] [address] [G40.909]    Hydrocephaly ([facility]) [G91.9]    GERD (gastroesophageal reflux disease) [K21.9]    Depression [F32.9]    Bipolar 1 disorder ([facility]) [F31.9]         Plan  · COVID-19 viral pneumonia w/ associated acute hypoxic respiratory failure: Contact/droplet isolation. Remdesivir per pharmacy. Decadron. [place] as able. Tylenol prn fevers or myalgias. Self proning as able. BID Lovenox. Vitamin C/thiamine/Zinc/VitD/Pepcid. Check ambulatory pulse ox-nursing to call me if he needs oxygen at home. · Relative hypotension: IVF. I do not think he needs Midodrine. Check orthostatic BP's-- neg. · Transaminitis, improving: likely related to COVID or REM. Continue to monitor. · PT/OT-- 20/24  · Follow labs  · DVT prophylaxis. · Please see orders for further management and care. ·  for discharge planning  · Discharge plan: Likely discharge back to group home today    Electronically signed by Russell Satnos DO on 1/15/2021 at 12:52 PM    I can be reached through [employer].

## 2021-01-15 NOTE — DISCHARGE SUMMARY
IVF. I do not think he needs Midodrine. Negative orthostatic BP's. · Transaminitis, improving: likely related to COVID or REM. Continue to monitor. · PT/OT-- 20/24   · Discharged back to the group home    BRIEF PHYSICAL EXAMINATION AND LABORATORIES ON DAY OF DISCHARGE:  VITALS:  /70   Pulse 93   Temp 96.6 °F (35.9 °C) (Oral)   Resp 17   Ht 6' (1.829 m)   Wt 216 lb (98 kg)   SpO2 94%   BMI 29.29 kg/m²     Please see note from the same day. LABS[de-identified]  Recent Labs     01/13/21 0428 01/14/21  0940 01/15/21  0455    139 144   K 4.8 3.8 3.6    106 114*   CO2 24 23 22   BUN 18 15 12   CREATININE 0.8 0.9 0.9   GLUCOSE 131* 173* 109*   CALCIUM 8.2* 9.0 8.0*     Recent Labs     01/13/21 0428 01/14/21  0940 01/15/21  0455   ALKPHOS 71 84 93   * 124* 92*   * 97* 83*   PROT 6.6 7.4 6.1*   BILITOT 0.3 0.4 0.2   LABALBU 3.1* 3.1* 2.7*     Recent Labs     01/13/21 0428 01/14/21  0940 01/15/21  0455   WBC 6.6 7.0 5.9   RBC 4.29 4.58 4.11   HGB 13.5 14.1 12.4*   HCT 39.1 42.5 38.6   MCV 91.1 92.8 93.9   MCH 31.5 30.8 30.2   MCHC 34.5 33.2 32.1   RDW 12.9 12.5 12.7    319 171   MPV 10.8 10.5 11.2     Lab Results   Component Value Date    LABA1C 5.5 10/22/2020     Lab Results   Component Value Date    INR 1.4 01/12/2021    PROTIME 17.0 (H) 01/12/2021      No results found for: TSH  No results found for: TRIG, HDL, LDLCALC  No results for input(s): MG in the last 72 hours. No results for input(s): CKTOTAL, CKMB, TROPONINI in the last 72 hours. No results for input(s): LACTA in the last 72 hours. IMAGING:  Xr Chest Portable    Result Date: 1/11/2021  EXAMINATION: ONE XRAY VIEW OF THE CHEST 1/11/2021 4:13 pm COMPARISON: February 16, 2020 HISTORY: ORDERING SYSTEM PROVIDED HISTORY: sob TECHNOLOGIST PROVIDED HISTORY: Reason for exam:->sob FINDINGS: The heart is enlarged. Pulmonary vessels are congested. Mild haziness at the lung bases. No pneumothorax.     Pulmonary vascular congestion/early CHF. Cta Pulmonary W Contrast    Result Date: 1/11/2021  EXAMINATION: CTA OF THE CHEST 1/11/2021 7:20 pm TECHNIQUE: CTA of the chest was performed after the administration of intravenous contrast.  Multiplanar reformatted images are provided for review. MIP images are provided for review. Dose modulation, iterative reconstruction, and/or weight based adjustment of the mA/kV was utilized to reduce the radiation dose to as low as reasonably achievable. COMPARISON: None. HISTORY: ORDERING SYSTEM PROVIDED HISTORY: SOB fever negative covid TECHNOLOGIST PROVIDED HISTORY: Reason for exam:->SOB fever negative covid FINDINGS: Pulmonary Arteries: Pulmonary arteries are adequately opacified for evaluation. No evidence of intraluminal filling defect to suggest pulmonary embolism. Main pulmonary artery is normal in caliber. Mediastinum: No evidence of mediastinal lymphadenopathy. The heart and pericardium demonstrate no acute abnormality. There is no acute abnormality of the thoracic aorta. Lungs/pleura: Bilateral patchy areas of ground-glass appearance mostly peripheral based consistent with pneumonia. COVID pneumonia suspected. Bibasilar atelectasis. No evidence of pleural effusion or pneumothorax. Upper Abdomen: Limited images of the upper abdomen are unremarkable. Soft Tissues/Bones: No acute bone or soft tissue abnormality. Degenerative changes thoracic spine. No evidence of pulmonary embolism. Bilateral patchy areas of ground-glass appearance mostly peripheral based consistent with pneumonia. COVID pneumonia suspected. MICROBIOLOGY:  BLOOD CX #1  No results for input(s): BC in the last 72 hours. BLOOD CX #2  No results for input(s): Rolm Rodrigez in the last 72 hours. TIP CULTURE  No results for input(s): CXCATHTIP in the last 72 hours. CULTURE, RESPIRATORY   No results for input(s): CULTRESP in the last 72 hours.   RESPIRATORY SMEAR  No results for input(s): RESPSMEAR in the last 72 hours.     DISPOSITION:  The patient's condition is fair. The patient is being discharged to home    DISCHARGE MEDICATIONS:    Oc Guerra   Home Medication Instructions [facility]:[id number]    Printed on:01/17/21 1100   Medication Information                      acetaminophen (TYLENOL) 325 MG tablet  acetaminophen acetaminophen 325 mg tablet 2 tablet(s) Every 6 hours as needed for pain as needed for pain By Christo ([id number])             albuterol sulfate  (90 Base) MCG/ACT inhaler  Inhale 2 puffs into the lungs every 6 hours as needed for Wheezing             benzonatate (TESSALON) 100 MG capsule  Take 1 capsule by mouth 3 times daily as needed for Cough             calcium carbonate (TUMS) 500 MG chewable tablet  Take 1 tablet by mouth daily             Cariprazine HCl (VRAYLAR) 3 MG CAPS  Take by mouth nightly             clonazePAM (KLONOPIN) 0.5 MG tablet  Take 0.5 mg by mouth 2 times daily. .             cyanocobalamin 1000 MCG/ML injection  Inject 1,000 mcg into the muscle every 30 days             cycloSPORINE (RESTASIS) 0.05 % ophthalmic emulsion  Place 1 drop into both eyes every 12 hours             fluticasone (FLONASE) 50 MCG/ACT nasal spray  1 spray by Nasal route daily             memantine (NAMENDA) 10 MG tablet  Take 10 mg by mouth 2 times daily             metoclopramide (REGLAN) 5 MG tablet  Take 5 mg by mouth 4 times daily             Multiple Vitamins-Minerals (THERAPEUTIC MULTIVITAMIN-MINERALS) tablet  Take 1 tablet by mouth daily             omeprazole (PRILOSEC) 20 MG delayed release capsule  Take 40 mg by mouth daily             ondansetron (ZOFRAN) 4 MG tablet  Take 4 mg by mouth every 8 hours as needed for Nausea or Vomiting             PHENobarbital (LUMINAL) 60 MG tablet  Take 60 mg by mouth 3 times daily. .             rivastigmine (EXELON) 6 MG capsule  Take 6 mg by mouth 2 times daily             rizatriptan (MAXALT) 10 MG tablet  Take 10 mg by mouth once as needed for Migraine May repeat in 2 hours if needed             simvastatin (ZOCOR) 10 MG tablet  Take 10 mg by mouth nightly             thiamine 100 MG tablet  Take 1 tablet by mouth daily             topiramate (TOPAMAX) 100 MG tablet  Take 100 mg by mouth 2 times daily             vitamin D (ERGOCALCIFEROL) 17880 units CAPS capsule  Take 50,000 Units by mouth once a week             vitamin E 400 UNIT capsule  Take 400 Units by mouth daily             zinc sulfate (ZINCATE) 220 (50 Zn) MG capsule  Take 1 capsule by mouth daily                 Discharge Medication List as of 1/15/2021  4:14 PM        Discharge Medication List as of 1/15/2021  4:14 PM      STOP taking these medications       levETIRAcetam (KEPPRA) 500 MG tablet Comments:   Reason for Stopping:         mirtazapine (REMERON) 15 MG tablet Comments:   Reason for Stopping:             Discharge Medication List as of 1/15/2021  4:14 PM      START taking these medications    Details   albuterol sulfate  (90 Base) MCG/ACT inhaler Inhale 2 puffs into the lungs every 6 hours as needed for Wheezing, Disp-1 Inhaler, R-0Normal      benzonatate (TESSALON) 100 MG capsule Take 1 capsule by mouth 3 times daily as needed for Cough, Disp-21 capsule, R-0Normal      thiamine 100 MG tablet Take 1 tablet by mouth daily, Disp-30 tablet, R-0Normal      zinc sulfate (ZINCATE) 220 (50 Zn) MG capsule Take 1 capsule by mouth daily, Disp-30 capsule, R-0OTC             INTERNAL MEDICINE FOLLOW UP/INSTRUCTIONS:  · Follow-up with primary care physician as directed in discharge paperwork. · Please review results of imaging studies with PCP. · Follow-up with consultants as directed by them. · If recurrence or worsening of symptoms go to the ED or call primary care physician. · Diet: DIET GENERAL    Preparing for this patient's discharge, including paperwork, orders, instructions, and meeting with patient did required 35 minutes.     Electronically signed by Delon Sagastume DO on 1/17/2021 at 11:00 AM

## 2021-01-15 NOTE — CARE COORDINATION
Social work / Discharge Planning:      [facility] 346. RN updated social work that patient will not require oxygen for discharge. Social work updated Dr. Surinder Dunn.     CM spoke to group home nurse and they will come to pick the patient up around 4pm.   Electronically signed by BETTINA Valera on 1/15/2021 at 1:51 PM

## 2021-01-15 NOTE — PROGRESS NOTES
Occupational Therapy  OT BEDSIDE TREATMENT NOTE      Date:1/15/2021  Patient Name: Frank Barker  MRN: [id number]  : 1960  Room: [address] [place] [address]-A     Referring Provider: hCago Costa         Evaluating OT: Keith Erazo OTR/L [id number]     AM-PAC Daily Activity Raw Score:      Recommended Adaptive Equipment:  TBD      Diagnosis: Pneumonia, COVID 19     Pertinent Medical History: bipolar disorder, seizure   Precautions:  Falls, alarm, O2, DROPLET PLUS      Home Living: Pt lives at group home,  Bed/bath on 2nd floor   Independent with ADL, no device for mobility       Pain Level: Pt did not report pain during session. Cognition: Awake. Flat affect. Slow to respond. Moderate cues for safety. Memory:  Fair               Sequencing:  Fair               Problem solving:  Fair               Judgement/safety:  fair                Functional Assessment:    Initial Eval Status  Date: 21 Treatment Status   STGs = LTGs  Time frame: 7-10 days   Feeding Independent       Grooming Set-up  Mod A comb hair Independent   UB Dressing Set-up  Min  A to change gown.   Independent    LB Dressing Min A  Able to doff sock, assist donning  Mod A thread clothing over feet. Mod I    Bathing Min A Mod A   Pt completed caitlyn hygiene. Mod I    Toileting Independent  Pt requesting to go to the bathroom. Pt had condom catheter however urine coming out around catheter.  Nursing present and removed catheter.      Bed Mobility  Independent   Supine <> sit  SBA supine <>  sit      Functional Transfers SBA  Sit-stand from bed, chair CGA and cues for safety  Mod  I   Functional Mobility CGA, no device  Ambulating in room   2 L O2   No signs or complaints of SOB  O2 sats in 90s  Min  A HHA   Mod I with good tolerance    Balance Sitting:     Static:  Independent     Dynamic:SBA   Standing: CGA    Independent  with good tolerance    Activity Tolerance Fair with light activity  Fair-   Good with ADL activity        Comments: Pt requesting to use the bathroom. Nurse aid present however unsure of how pt transfers/mobility. ADL activity completed while seated on the toilet. SOB with exertion. Pt declined to sit in the chair. Returned to bed at end of the session. Education/treatment:  ADL retraining with facilitation of movement to increase self care. Therapeutic activity to address balance, strength, and endurance. Pt education of transfer safety and daily orientation. · Pt has made  progress towards set goals.    Plan of Care: 1-3 days/week for 1-2 weeks PRN   [x]? ?ADL retraining/adapted techniques and AE recommendations to increase functional independence within precautions                    [x]? ? Energy conservation techniques to improve tolerance for selfcare routine   [x]? ? Functional transfer/mobility training/DME recommendations for increased independence, safety and fall prevention         [x]? ?Patient/family education to increase safety and functional independence             [x]? ? Environmental modifications for safe mobility and completion of ADLs                             []? ? Cognitive retraining ex's to improve problem solving skills & safe participation in ADLs/IADLs     []? ?Sensory re-education techniques to improve extremity awareness, maintain skin integrity and improve hand function                             []? ? Visual/Perceptual retraining  to improve body awareness and safety during transfers and ADLs  []? ? Splinting/positioning needs to maintain joint/skin integrity and prevent contractures  [x]? ? Therapeutic activity to improve functional performance during ADLs.                                         [x]? ? Therapeutic exercise to improve tolerance and functional strength for ADLs   [x]? ? Balance retraining/tolerance tasks for facilitation of postural control with dynamic challenges during ADLs .  []?? Neuromuscular re-education: facilitation of righting/equilibrium reactions, midline orientation, scapular stability/mobility, Normalization muscle     tone and facilitation active functional movement/Attention                         []? ? Delirium prevention/treatment    [x]? Positioning to improve functional independence  []? ? Other:     Time In: 10:19     Time Out: 10:55     Min Units   Therapeutic Ex 46186     Therapeutic Activities 76285 5 2   ADL/Self Care 85564 15 1   Orthotic Management 26002     Neuro Re-Ed 34640     Non-Billable Time     TOTAL TIMED TREATMENT 24 [facility] PA/L [id number]

## 2021-01-15 NOTE — DISCHARGE INSTR - COC
Continuity of Care Form    Patient Name: Amarilis Denson   :  1960  MRN:  [id number]    Admit date:  2021  Discharge date:  1/15/2021    Code Status Order: Full Code   Advance Directives:   [address] [facility] Documentation     Date/Time Healthcare Directive Type of Healthcare Directive Copy in [address] Agent's Name Healthcare Agent's Phone Number    [phone]  Unknown, patient unable to respond due to medical condition -- -- -- -- --          Admitting Physician:  Diandra López DO  PCP: Rosalinda Real MD    Discharging Nurse: [facility]PIRAT[facility] COM[facility]ARE Unit/Room#: 3853/0561-J  Discharging Unit Phone Number: [phone]    Emergency Contact:   Extended Emergency Contact Information  Primary Emergency Contact: Edie Jensen  Address: [address] [facility], [address] Hand of [address] [place] [address] Phone: [phone]  Mobile Phone: [phone]  Relation: Other  Secondary Emergency Contact: Adis Esquivel  [place] Phone: [phone]  Relation: Other    Past Surgical History:  No past surgical history on file. Immunization History: There is no immunization history on file for this patient.     Active Problems:  Patient Active Problem List   Diagnosis Code    Pneumonia due to COVID-19 virus U07.1, J12.82    GERD (gastroesophageal reflux disease) K21.9    Depression F32.9    Bipolar 1 disorder ([address] F31.9    Seizure disorder ([address] G40.909    Hydrocephaly ([address] G91.9    Acute respiratory failure with hypoxia ([facility] [address] [facility] J96.01       Isolation/Infection:   Isolation          Droplet Plus        Patient Infection Status     Infection Onset Added Last Indicated Last Indicated By Review Planned Expiration Resolved Resolved By    COVID-19 21 Respiratory Panel, Molecular, with COVID-19 (Restricted: peds pts or suitable admitted adults) 21      Resolved    COVID-19 Rule Out 21 21 COVID-19 Ambulatory (Ordered)   21 Rule-Out Test Resulted    COVID-19 Rule Out 21 Respiratory Panel, Molecular, with COVID-19 (Restricted: peds pts or suitable admitted adults) (Ordered)   21 Rule-Out Test Resulted    INFLUENZA 20 Respiratory Panel, Molecular   20           Nurse Assessment:  Last Vital Signs: /70   Pulse 93   Temp 96.6 °F (35.9 °C) (Oral)   Resp 17   Ht 6' (1.829 m)   Wt 216 lb (98 kg)   SpO2 94%   BMI 29.29 kg/m²     Last documented pain score (0-10 scale): Pain Level: 0  Last Weight:   Wt Readings from Last 1 Encounters:   21 216 lb (98 kg)     Mental Status:  oriented, alert, coherent and able to concentrate and follow conversation    IV Access:  - None    Nursing Mobility/ADLs:  Walking   Assisted  Transfer  Assisted  Bathing  Assisted  Dressing  Assisted  Toileting  Assisted  Feeding  Independent after set-up  Med Admin  Dependent  Med Delivery   whole with apple sauce    Wound Care Documentation and Therapy:        Elimination:  Continence:   · Bowel: Yes  · Bladder: Yes  Urinary Catheter: None   Colostomy/Ileostomy/Ileal Conduit: No       Date of Last BM: 1-15-21    Intake/Output Summary (Last 24 hours) at 1/15/2021 1610  Last data filed at 1/15/2021 1120  Gross per 24 hour   Intake 1841.66 ml   Output 600 ml   Net 1241.66 ml     I/O last 3 completed shifts: In: 1841.7 [P.O.:120; I.V.:1471.7; IV Piggyback:250]  Out: 600 [Urine:600]    Safety Concerns:      At Risk for Falls and History of Seizures    Impairments/Disabilities:      Speech    Nutrition Therapy:  Current Nutrition Therapy:   - Oral Diet:  General    Routes of Feeding: Oral  Liquids: No Restrictions  Daily Fluid Restriction: no  Last Modified Barium Swallow with Video (Video Swallowing Test): not done    Treatments at the Time of Hospital Discharge:   Respiratory Treatments: ***  Oxygen Therapy:  is not on home oxygen therapy. Ventilator:    - No ventilator support    Rehab Therapies: Physical Therapy and Occupational Therapy @ HOSPITAL  Weight Bearing Status/Restrictions: No weight bearing restirctions  Other Medical Equipment (for information only, NOT a DME order):  hospital bed and grabs onto staff for ambulation  Other Treatments: none    Patient's personal belongings (please select all that are sent with patient):  None    RN SIGNATURE:  Electronically signed by Leslie Richter RN on 1/15/21 at 4:14 PM EST    CASE MANAGEMENT/SOCIAL WORK SECTION    Inpatient Status Date: ***    Readmission Risk Assessment Score:  Readmission Risk              Risk of Unplanned Readmission:        20           Discharging to Facility/ Agency   · Name:   · Address:  · Phone:  · Fax:    Dialysis Facility (if applicable)   · Name:  · Address:  · Dialysis Schedule:  · Phone:  · Fax:    / signature: {Esignature:[id number]}    PHYSICIAN SECTION    Prognosis: {Prognosis:4305859966}    Condition at Discharge: Alfredo Hemphill Patient Condition:986061977}    Rehab Potential (if transferring to Rehab): {Prognosis:2278401426}    Recommended Labs or Other Treatments After Discharge: ***    Physician Certification: I certify the above information and transfer of Larry Later  is necessary for the continuing treatment of the diagnosis listed and that he requires {Admit to Appropriate Level of Care:42699} for {GREATER/LESS:419665475} 30 days.      Update Admission H&P: {CHP DME Changes in [facility]:468039337}    PHYSICIAN SIGNATURE:  {Esignature:[id number]}

## 2021-01-15 NOTE — PROGRESS NOTES
Pulse ox was 94% on room air at rest.  Ambulated patient on room air. Oxygen saturation was 90% on room air while ambulating. Oxygen applied. Recovery pulse ox was 92% on 1liters of oxygen while ambulating.

## 2021-01-16 ENCOUNTER — CARE COORDINATION (OUTPATIENT)
Dept: CASE MANAGEMENT | Age: 61
End: 2021-01-16

## 2021-01-16 LAB
BLOOD CULTURE, ROUTINE: NORMAL
CULTURE, BLOOD 2: NORMAL

## 2021-01-16 NOTE — CARE COORDINATION
[address] Transitions Initial Follow Up Call    Call within 2 business days of discharge: Yes    Patient: Larry Rojas Patient : 1960   MRN: [id number]  Reason for Admission: covid positive   Discharge Date: 1/15/21 RARS: Readmission Risk Score: 20      Last Discharge [facility]       Complaint Diagnosis Description Type Department Provider    21 Cough; Fever  ED to Hosp-Admission (Discharged) (ADMITTED) [address] Alberto Christensen DO; Rosalba Leon, ... Facility: [facility]      First attempt to reach [place], LPN at [facility], for [facility] and [facility] monitoring Care Transition call post hospital discharge. Message left with CTN's contact information requesting return phone call. Follow Up  No future appointments.     Sheri Delatorre RN

## 2021-01-18 ENCOUNTER — CARE COORDINATION (OUTPATIENT)
Dept: CASE MANAGEMENT | Age: 61
End: 2021-01-18

## 2021-01-18 NOTE — CARE COORDINATION
[address] Transitions Initial Follow Up Call    Call within 2 business days of discharge: Yes    Patient: Marichuy Fast Patient : 1960   MRN: [id number]  Reason for Admission: covid positive   Discharge Date: 1/15/21 RARS: Readmission Risk Score: 20      Last Discharge [facility]       Complaint Diagnosis Description Type Department Provider    21 Cough; Fever  ED to Hosp-Admission (Discharged) (ADMITTED) [address] Alberto Christensen DO; Jyothi Millan, ... Second attempt to reach [place], LPN at [facility], for BPCI and covid monitoring Care Transition call post hospital discharge. Message left with CTN's contact information requesting return phone call, will hand off to the Care Transition Central team to follow. Follow Up  No future appointments.     Fernando Pinon RN

## 2021-01-23 NOTE — ED PROVIDER NOTES
HPI:  1/23/21, Time: 7:10 AM GIGI Carty is a 64 y.o. male presenting to the ED for SOB Fever concern for COVID, beginning several hours ago. The complaint has been persistent, moderate in severity, and worsened by light exertion. Patient sudden onset of fever 4:00 this morning temperature of 101.4. With cough. Concern for Covid. He was seen at [facility]  urgent care earlier today with a pulse ox 88 to 92%. He was sent to the emergency department for evaluation. He denies hymoptysis. No Covid exposures. No chest pain  POS short of breath. there is no abdominal pain nausea vomiting. Reports some watery diarrhea. ROS:   Pertinent positives and negatives are stated within HPI, all other systems reviewed and are negative.  --------------------------------------------- PAST HISTORY ---------------------------------------------  Past Medical History:  has a past medical history of Bipolar 1 disorder [facility] Depression, GERD (gastroesophageal reflux disease), Hydrocephaly ([facility] 75.), and Seizure disorder ([facility] 75.). Past Surgical History:  has no past surgical history on file. Social History:  reports that he has been smoking cigarettes. He has been smoking about 0.50 packs per day. He has never used smokeless tobacco. He reports that he does not drink alcohol or use drugs. Family History: family history is not on file. The patients home medications have been reviewed.     Allergies: Asa [aspirin], Augmentin [amoxicillin-pot clavulanate], and Pcn [penicillins]    ---------------------------------------------------PHYSICAL EXAM--------------------------------------    Constitutional/General: Alert and oriented x3, well appearing, non toxic in NAD  Head: Normocephalic and atraumatic  Eyes: PERRL, EOMI  Mouth: Oropharynx clear, handling secretions, no trismus  Neck: Supple, full ROM, non tender to palpation in the midline, no stridor, no crepitus, no meningeal signs  Pulmonary: Lungs clear to auscultation bilaterally, no wheezes, rales, or rhonchi. Not in respiratory distress  Cardiovascular:  Regular rate. Regular rhythm. No murmurs, gallops, or rubs. 2+ distal pulses  Chest: no chest wall tenderness  Abdomen: Soft. Non tender. Non distended. +BS. No rebound, guarding, or rigidity. No pulsatile masses appreciated. Musculoskeletal: Moves all extremities x 4. Warm and well perfused, no clubbing, cyanosis, or edema. Capillary refill <3 seconds  Skin: warm and dry. No rashes. Neurologic: GCS 15, CN 2-12 grossly intact, no focal deficits, symmetric strength 5/5 in the upper and lower extremities bilaterally  Psych: Normal Affect    -------------------------------------------------- RESULTS -------------------------------------------------  I have personally reviewed all laboratory and imaging results for this patient. Results are listed below.      LABS:  Results for orders placed or performed during the hospital encounter of 01/11/21   Culture, Blood 1    Specimen: Blood   Result Value Ref Range    Blood Culture, Routine 5 Days no growth    Culture, Blood 2    Specimen: Blood   Result Value Ref Range    Culture, Blood 2 5 Days no growth    Respiratory Panel, Molecular, with COVID-19 (Restricted: peds pts or suitable admitted adults)    Specimen: Nasopharyngeal   Result Value Ref Range    Adenovirus by PCR Not Detected Not Detected    Bordetella parapertussis by PCR Not Detected Not Detected    Bordetella pertussis by PCR Not Detected Not Detected    Chlamydophilia pneumoniae by PCR Not Detected Not Detected    Coronavirus 229E by PCR Not Detected Not Detected    Coronavirus HKU1 by PCR Not Detected Not Detected    Coronavirus NL63 by PCR Not Detected Not Detected    Coronavirus OC43 by PCR Not Detected Not Detected    SARS-CoV-2, PCR DETECTED (A) Not Detected    Human Metapneumovirus by PCR Not Detected Not Detected    Human Rhinovirus/Enterovirus by PCR Not Detected Not Detected    Influenza A by PCR Not Detected Not Detected    Influenza B by PCR Not Detected Not Detected    Mycoplasma pneumoniae by PCR Not Detected Not Detected    Parainfluenza Virus 1 by PCR Not Detected Not Detected    Parainfluenza Virus 2 by PCR Not Detected Not Detected    Parainfluenza Virus 3 by PCR Not Detected Not Detected    Parainfluenza Virus 4 by PCR Not Detected Not Detected    Respiratory Syncytial Virus by PCR Not Detected Not Detected   Lactic Acid, Plasma   Result Value Ref Range    Lactic Acid 1.3 0.5 - 2.2 mmol/L   CBC Auto Differential   Result Value Ref Range    WBC 10.7 4.5 - 11.5 E9/L    RBC 5.02 3.80 - 5.80 E12/L    Hemoglobin 15.6 12.5 - 16.5 g/dL    Hematocrit 45.3 37.0 - 54.0 %    MCV 90.2 80.0 - 99.9 fL    MCH 31.1 26.0 - 35.0 pg    MCHC 34.4 32.0 - 34.5 %    RDW 12.3 11.5 - 15.0 fL    Platelets 753 269 - 351 E9/L    MPV 10.6 7.0 - 12.0 fL    Neutrophils % 84.5 (H) 43.0 - 80.0 %    Immature Granulocytes % 0.7 0.0 - 5.0 %    Lymphocytes % 10.1 (L) 20.0 - 42.0 %    Monocytes % 4.5 2.0 - 12.0 %    Eosinophils % 0.1 0.0 - 6.0 %    Basophils % 0.1 0.0 - 2.0 %    Neutrophils Absolute 9.07 (H) 1.80 - 7.30 E9/L    Immature Granulocytes # 0.07 E9/L    Lymphocytes Absolute 1.08 (L) 1.50 - 4.00 E9/L    Monocytes Absolute 0.48 0.10 - 0.95 E9/L    Eosinophils Absolute 0.01 (L) 0.05 - 0.50 E9/L    Basophils Absolute 0.01 0.00 - 0.20 M3/U   Basic Metabolic Panel   Result Value Ref Range    Sodium 137 132 - 146 mmol/L    Potassium 3.6 3.5 - 5.0 mmol/L    Chloride 103 98 - 107 mmol/L    CO2 23 22 - 29 mmol/L    Anion Gap 11 7 - 16 mmol/L    Glucose 116 (H) 74 - 99 mg/dL    BUN 17 6 - 20 mg/dL    CREATININE 1.2 0.7 - 1.2 mg/dL    GFR Non-African American >60 >=60 mL/min/1.73    GFR African American >60     Calcium 8.3 (L) 8.6 - 10.2 mg/dL   D-Dimer, Quantitative   Result Value Ref Range    D-Dimer, Quant 731 ng/mL DDU   Procalcitonin   Result Value Ref Range    Procalcitonin 0.23 (H) 0.00 - 0.08 ng/mL   Arterial Blood Gas, Respiratory Only   Result Value Ref Range    Source: Arterial     pH, Blood Gas 7.413 7.350 - 7.450    pCO2, Arterial 33.7 (L) 35.0 - 45.0 mmHg    pO2, Arterial 43.1 (L) 80.0 - 100.0 mmHg    HCO3, Arterial 21.5 (L) 22.0 - 26.0 mmol/L    B.E. -2.2 -3.0 - 0.0 mmol/L    O2 Sat 79.8 (L) 92.0 - 98.5 %     ID 1,210     DEVICE 15,[id number]     Delivery Systems [employer]    Protime-INR   Result Value Ref Range    Protime 17.0 (H) 9.3 - 12.4 sec    INR 1.4    Ferritin   Result Value Ref Range    Ferritin 1,088 ng/mL   APTT   Result Value Ref Range    aPTT 31.6 24.5 - 35.1 sec   CBC auto differential   Result Value Ref Range    WBC 7.7 4.5 - 11.5 E9/L    RBC 4.44 3.80 - 5.80 E12/L    Hemoglobin 14.0 12.5 - 16.5 g/dL    Hematocrit 41.6 37.0 - 54.0 %    MCV 93.7 80.0 - 99.9 fL    MCH 31.5 26.0 - 35.0 pg    MCHC 33.7 32.0 - 34.5 %    RDW 12.9 11.5 - 15.0 fL    Platelets 150 303 - 418 E9/L    MPV 11.4 7.0 - 12.0 fL    Neutrophils % 65.0 43.0 - 80.0 %    Immature Granulocytes % 0.5 0.0 - 5.0 %    Lymphocytes % 27.3 20.0 - 42.0 %    Monocytes % 6.2 2.0 - 12.0 %    Eosinophils % 0.6 0.0 - 6.0 %    Basophils % 0.4 0.0 - 2.0 %    Neutrophils Absolute 5.01 1.80 - 7.30 E9/L    Immature Granulocytes # 0.04 E9/L    Lymphocytes Absolute 2.11 1.50 - 4.00 E9/L    Monocytes Absolute 0.48 0.10 - 0.95 E9/L    Eosinophils Absolute 0.05 0.05 - 0.50 E9/L    Basophils Absolute 0.03 0.00 - 0.20 E9/L   C-Reactive Protein   Result Value Ref Range    CRP 23.6 (H) 0.0 - 0.4 mg/dL   Sedimentation Rate   Result Value Ref Range    Sed Rate 54 (H) 0 - 15 mm/Hr   Fibrinogen   Result Value Ref Range    Fibrinogen >700 (H) 225 - 540 mg/dL   SPECIMEN REJECTION   Result Value Ref Range    Rejected Test cmpx     Reason for Rejection see below    CBC auto differential   Result Value Ref Range    WBC 6.6 4.5 - 11.5 E9/L    RBC 4.29 3.80 - 5.80 E12/L    Hemoglobin 13.5 12.5 - 16.5 g/dL    Hematocrit 39.1 37.0 - 54.0 %    MCV 91.1 80.0 - 99.9 fL    MCH 31.5 26.0 - 35.0 pg    MCHC 34.5 32.0 - 34.5 %    RDW 12.9 11.5 - 15.0 fL    Platelets 256 642 - 379 E9/L    MPV 10.8 7.0 - 12.0 fL    Neutrophils % 77.0 43.0 - 80.0 %    Lymphocytes % 10.0 (L) 20.0 - 42.0 %    Monocytes % 7.0 2.0 - 12.0 %    Eosinophils % 1.0 0.0 - 6.0 %    Basophils % 0.0 0.0 - 2.0 %    Neutrophils Absolute 5.08 1.80 - 7.30 E9/L    Lymphocytes Absolute 0.99 (L) 1.50 - 4.00 E9/L    Monocytes Absolute 0.46 0.10 - 0.95 E9/L    Eosinophils Absolute 0.07 0.05 - 0.50 E9/L    Basophils Absolute 0.00 0.00 - 0.20 E9/L    Atypical Lymphocytes Relative 5.0 (H) 0.0 - 4.0 %    RBC Morphology Normal    Comprehensive Metabolic Panel w/ Reflex to MG   Result Value Ref Range    Sodium 136 132 - 146 mmol/L    Potassium reflex Magnesium 4.8 3.5 - 5.0 mmol/L    Chloride 103 98 - 107 mmol/L    CO2 24 22 - 29 mmol/L    Anion Gap 9 7 - 16 mmol/L    Glucose 131 (H) 74 - 99 mg/dL    BUN 18 6 - 20 mg/dL    CREATININE 0.8 0.7 - 1.2 mg/dL    GFR Non-African American >60 >=60 mL/min/1.73    GFR African American >60     Calcium 8.2 (L) 8.6 - 10.2 mg/dL    Total Protein 6.6 6.4 - 8.3 g/dL    Alb 3.1 (L) 3.5 - 5.2 g/dL    Total Bilirubin 0.3 0.0 - 1.2 mg/dL    Alkaline Phosphatase 71 40 - 129 U/L     (H) 0 - 40 U/L     (H) 0 - 39 U/L   CBC auto differential   Result Value Ref Range    WBC 7.0 4.5 - 11.5 E9/L    RBC 4.58 3.80 - 5.80 E12/L    Hemoglobin 14.1 12.5 - 16.5 g/dL    Hematocrit 42.5 37.0 - 54.0 %    MCV 92.8 80.0 - 99.9 fL    MCH 30.8 26.0 - 35.0 pg    MCHC 33.2 32.0 - 34.5 %    RDW 12.5 11.5 - 15.0 fL    Platelets 394 275 - 443 E9/L    MPV 10.5 7.0 - 12.0 fL    Neutrophils % 68.0 43.0 - 80.0 %    Lymphocytes % 14.0 (L) 20.0 - 42.0 %    Monocytes % 6.0 2.0 - 12.0 %    Eosinophils % 0.0 0.0 - 6.0 %    Basophils % 0.0 0.0 - 2.0 %    Neutrophils Absolute 4.83 1.80 - 7.30 E9/L    Lymphocytes Absolute 1.75 1.50 - 4.00 E9/L    Monocytes Absolute 0.42 0.10 - 0.95 E9/L    Eosinophils Absolute 0.00 (L) 0.05 - 0.50 E9/L    Basophils Absolute 0.00 0.00 - 0.20 E9/L    Atypical Lymphocytes Relative 11.0 (H) 0.0 - 4.0 %    Metamyelocytes Relative 1.0 0.0 - 1.0 %    RBC Morphology Normal    Comprehensive Metabolic Panel w/ Reflex to MG   Result Value Ref Range    Sodium 139 132 - 146 mmol/L    Potassium reflex Magnesium 3.8 3.5 - 5.0 mmol/L    Chloride 106 98 - 107 mmol/L    CO2 23 22 - 29 mmol/L    Anion Gap 10 7 - 16 mmol/L    Glucose 173 (H) 74 - 99 mg/dL    BUN 15 6 - 20 mg/dL    CREATININE 0.9 0.7 - 1.2 mg/dL    GFR Non-African American >60 >=60 mL/min/1.73    GFR African American >60     Calcium 9.0 8.6 - 10.2 mg/dL    Total Protein 7.4 6.4 - 8.3 g/dL    Alb 3.1 (L) 3.5 - 5.2 g/dL    Total Bilirubin 0.4 0.0 - 1.2 mg/dL    Alkaline Phosphatase 84 40 - 129 U/L     (H) 0 - 40 U/L    AST 97 (H) 0 - 39 U/L   CBC auto differential   Result Value Ref Range    WBC 5.9 4.5 - 11.5 E9/L    RBC 4.11 3.80 - 5.80 E12/L    Hemoglobin 12.4 (L) 12.5 - 16.5 g/dL    Hematocrit 38.6 37.0 - 54.0 %    MCV 93.9 80.0 - 99.9 fL    MCH 30.2 26.0 - 35.0 pg    MCHC 32.1 32.0 - 34.5 %    RDW 12.7 11.5 - 15.0 fL    Platelets 589 444 - 870 E9/L    MPV 11.2 7.0 - 12.0 fL    Neutrophils % 60.0 43.0 - 80.0 %    Lymphocytes % 23.0 20.0 - 42.0 %    Monocytes % 9.0 2.0 - 12.0 %    Eosinophils % 0.0 0.0 - 6.0 %    Basophils % 0.0 0.0 - 2.0 %    Neutrophils Absolute 3.54 1.80 - 7.30 E9/L    Lymphocytes Absolute 1.83 1.50 - 4.00 E9/L    Monocytes Absolute 0.53 0.10 - 0.95 E9/L    Eosinophils Absolute 0.00 (L) 0.05 - 0.50 E9/L    Basophils Absolute 0.00 0.00 - 0.20 E9/L    Atypical Lymphocytes Relative 8.0 (H) 0.0 - 4.0 %    RBC Morphology Normal    Comprehensive Metabolic Panel w/ Reflex to MG   Result Value Ref Range    Sodium 144 132 - 146 mmol/L    Potassium reflex Magnesium 3.6 3.5 - 5.0 mmol/L    Chloride 114 (H) 98 - 107 mmol/L    CO2 22 22 - 29 mmol/L    Anion Gap 8 7 - 16 mmol/L    Glucose 109 (H) 74 - 99 mg/dL    BUN 12 6 - 20 mg/dL    CREATININE 0.9 0.7 - 1.2 mg/dL    GFR Non-African American >60 >=60 mL/min/1.73    GFR African American >60     Calcium 8.0 (L) 8.6 - 10.2 mg/dL    Total Protein 6.1 (L) 6.4 - 8.3 g/dL    Alb 2.7 (L) 3.5 - 5.2 g/dL    Total Bilirubin 0.2 0.0 - 1.2 mg/dL    Alkaline Phosphatase 93 40 - 129 U/L    ALT 92 (H) 0 - 40 U/L    AST 83 (H) 0 - 39 U/L   EKG 12 Lead   Result Value Ref Range    Ventricular Rate 115 BPM    Atrial Rate 115 BPM    P-R Interval 130 ms    QRS Duration 92 ms    Q-T Interval 318 ms    QTc Calculation (Bazett) 439 ms    P Axis 45 degrees    R Axis 69 degrees    T Axis 60 degrees       RADIOLOGY:  Interpreted by Radiologist.  CTA PULMONARY W CONTRAST   Final Result   No evidence of pulmonary embolism. Bilateral patchy areas of ground-glass appearance mostly peripheral based   consistent with pneumonia. COVID pneumonia suspected. XR CHEST PORTABLE   Final Result   Pulmonary vascular congestion/early CHF. EKG Interpretation  Interpreted by emergency department physician    Rhythm: sinus tachycardia  Rate: 115 bpm  Axis: normal  Conduction: normal  ST Segments: nonspecific changes  T Waves: non specific changes    Clinical Impression: sinus tachycardia  Comparison to prior EKG: None      ------------------------- NURSING NOTES AND VITALS REVIEWED ---------------------------   The nursing notes within the ED encounter and vital signs as below have been reviewed by myself. /70   Pulse 93   Temp 96.6 °F (35.9 °C) (Oral)   Resp 17   Ht 6' (1.829 m)   Wt 216 lb (98 kg)   SpO2 94%   BMI 29.29 kg/m²   Oxygen Saturation Interpretation: Normal    The patients available past medical records and past encounters were reviewed.         ------------------------------ ED COURSE/MEDICAL DECISION MAKING----------------------  Medications   sodium chloride flush 0.9 % injection (has no administration in time range)   methylPREDNISolone sodium (SOLU-MEDROL) injection 80 mg (80 mg Intravenous Given 1/11/21 1552)   acetaminophen (TYLENOL) tablet 650 mg (650 mg Oral Given 1/11/21 1552)   0.9 % sodium chloride bolus (0 mLs Intravenous Stopped 1/11/21 1806)   iopamidol (ISOVUE-370) 76 % injection 75 mL (75 mLs Intravenous Given 1/11/21 2021)   sodium chloride flush 0.9 % injection 10 mL (10 mLs Intravenous Given 1/11/21 2019)   dexamethasone (PF) (DECADRON) injection 6 mg (6 mg Intravenous Given 1/11/21 2202)   remdesivir 200 mg in sodium chloride 0.9 % 250 mL IVPB (0 mg Intravenous Stopped 1/12/21 0248)     Followed by   remdesivir 100 mg in sodium chloride 0.9 % 250 mL IVPB (0 mg Intravenous Stopped 1/15/21 1629)             Medical Decision Making:    Presets with Fever chills and SOB concern for COVID-19. Patient required supplemental oxygen. He was given dexamethasone. He was admitted with random severe treatment. Tested positive for COVID-19. CAT scan of the chest confirmed bilateral groundglass opacities no PE. Consultation made with hospitalist service for admission. Re-Evaluations:             Re-evaluation. Patients symptoms are improving with above treatment       Consultations:             Dr. Jacob Paige will admit    Critical Care:   CRITICAL CARE:   30 MINUTES. Please note that the withdrawal or failure to initiate urgent interventions for this patient would likely result in a life threatening deterioration or permanent disability. Accordingly this patient received the above mentioned time, excluding separately billable procedures.              This patient's ED course included: a personal history and physicial examination, re-evaluation prior to disposition, multiple bedside re-evaluations, IV medications, cardiac monitoring, continuous pulse oximetry, complex medical decision making and emergency management, Supplemental Oxygen and a personal history and physicial eaxmination    This patient has remained hemodynamically stable, improved and been closely monitored during their ED course. Counseling: The emergency provider has spoken with the patient and discussed todays results, in addition to providing specific details for the plan of care and counseling regarding the diagnosis and prognosis. Questions are answered at this time and they are agreeable with the plan.       --------------------------------- IMPRESSION AND DISPOSITION ---------------------------------    IMPRESSION  1. Pneumonia due to COVID-19 virus    2. Acute respiratory failure with hypoxia (HCC)        DISPOSITION  Disposition: Admit to telemetry  Patient condition is serious        NOTE: This report was transcribed using voice recognition software.  Every effort was made to ensure accuracy; however, inadvertent computerized transcription errors may be present       Diamond Craig DO  01/23/21 3834

## 2021-01-25 ENCOUNTER — CARE COORDINATION (OUTPATIENT)
Dept: CASE MANAGEMENT | Age: 61
End: 2021-01-25

## 2021-01-25 NOTE — CARE COORDINATION
[facility] Transitions Initial Follow Up Call    Call within 2 business days of discharge: No    Patient: Mike Quezada Patient : 1960   MRN: [id number]  Reason for Admission: Cough, fever  Discharge Date: 1/15/21 RARS: Readmission Risk Score: 20      Last Discharge [facility]       Complaint Diagnosis Description Type Department Provider    21 Cough; Fever Pneumonia due to COVID-19 virus . .. ED to Hosp-Admission (Discharged) (ADMITTED) [facility], DO; Diamond Craig, ... Spoke with: FAYE Greene, at [facility]: [address] [facility]    Non-face-to-face services provided:  Obtained and reviewed discharge summary and/or continuity of care documents   -medication review completed       Care Transitions 24 Hour Call    Do you have any ongoing symptoms?: Yes  Patient-reported symptoms: Weakness, Cough  Have you scheduled your follow up appointment?: No (Comment: nurse to call tomorrow to schedule)  Do you feel like you have everything you need to keep you well at home?: Yes  Care Transitions Interventions  No Identified Needs    Ramona reports today is patient's last day in quarantine.   -denies patient with fever/chills  -reports patient's appetite has improved   -patient with normal bladder/bowel elimination   -falls x 2 without injury since returning to home     Discussed BPCI-A 90-day post-acute outreach; Gratz, LPN verbalized understanding. Will continue to follow. Follow Up  No future appointments.     Gilles Sarkar RN

## 2021-02-01 ENCOUNTER — CARE COORDINATION (OUTPATIENT)
Dept: CASE MANAGEMENT | Age: 61
End: 2021-02-01

## 2021-02-01 NOTE — CARE COORDINATION
[facility] Transitions Follow Up Call    2021    Patient: Angie Allen  Patient : 1960   MRN: <[id number]>  Reason for Admission: PNA  Discharge Date: 1/15/21 RARS: Readmission Risk Score: 20         Attempted to contact patient's RN at [facility] for BPCI-A call. Contact information left to  requesting call back at the earliest convenience. Follow Up  No future appointments.     Mario Rajput RN

## 2021-02-08 ENCOUNTER — CARE COORDINATION (OUTPATIENT)
Dept: CASE MANAGEMENT | Age: 61
End: 2021-02-08

## 2021-02-08 NOTE — CARE COORDINATION
[facility] Transitions Follow Up Call    2021    Patient: Leroy Garcia  Patient : 1960   MRN: <[id number]>  Reason for Admission:   Discharge Date: 1/15/21 RARS: Readmission Risk Score: 20    Follow Up: Attempted to contact nurse at [facility].  Unable to reach her. Unable to leave a message. Voice mailbox is full. Will try again at a later time. No future appointments.     Melissa Syed RN

## 2021-02-17 ENCOUNTER — CARE COORDINATION (OUTPATIENT)
Dept: CASE MANAGEMENT | Age: 61
End: 2021-02-17

## 2021-03-03 ENCOUNTER — CARE COORDINATION (OUTPATIENT)
Dept: CASE MANAGEMENT | Age: 61
End: 2021-03-03

## 2021-03-03 NOTE — CARE COORDINATION
[facility] Transitions Bundled Payments for Care Improvement (BPCI) Follow Up Call  Qualifying Diagnosis of Qualifying Diagnosis Sepsis   3/3/2021  Patient Name:  Gay Griggs   YOB: 1960  Discharge Date:  1/15/21  RARS:  Readmission Risk Score: 20    PCP:  Bandar Anthony MD  Spoke with: Marva Farris, Nurse at [facility] who states Mildred Stein is doing well, \"no issues\".    Care Transitions will continue to follow per [facility].  Sal Segal, RN, CTN    Follow Up Concerns:  No future appointments.

## 2021-03-23 LAB
ALBUMIN SERPL-MCNC: 4.4 G/DL (ref 3.5–5.2)
ALP BLD-CCNC: 149 U/L (ref 40–129)
ALT SERPL-CCNC: 13 U/L (ref 0–40)
ANION GAP SERPL CALCULATED.3IONS-SCNC: 17 MMOL/L (ref 7–16)
AST SERPL-CCNC: 25 U/L (ref 0–39)
BASOPHILS ABSOLUTE: 0.04 E9/L (ref 0–0.2)
BASOPHILS RELATIVE PERCENT: 0.6 % (ref 0–2)
BILIRUB SERPL-MCNC: <0.2 MG/DL (ref 0–1.2)
BUN BLDV-MCNC: 13 MG/DL (ref 6–20)
CALCIUM SERPL-MCNC: 9.6 MG/DL (ref 8.6–10.2)
CHLORIDE BLD-SCNC: 108 MMOL/L (ref 98–107)
CHOLESTEROL, TOTAL: 210 MG/DL (ref 0–199)
CO2: 19 MMOL/L (ref 22–29)
CREAT SERPL-MCNC: 1 MG/DL (ref 0.7–1.2)
EOSINOPHILS ABSOLUTE: 0.28 E9/L (ref 0.05–0.5)
EOSINOPHILS RELATIVE PERCENT: 3.9 % (ref 0–6)
FOLATE: >20 NG/ML (ref 4.8–24.2)
GFR AFRICAN AMERICAN: >60
GFR NON-AFRICAN AMERICAN: >60 ML/MIN/1.73
GLUCOSE BLD-MCNC: 98 MG/DL (ref 74–99)
HCT VFR BLD CALC: 47.8 % (ref 37–54)
HDLC SERPL-MCNC: 59 MG/DL
HEMOGLOBIN: 15.1 G/DL (ref 12.5–16.5)
IMMATURE GRANULOCYTES #: 0.02 E9/L
IMMATURE GRANULOCYTES %: 0.3 % (ref 0–5)
LDL CHOLESTEROL CALCULATED: 110 MG/DL (ref 0–99)
LYMPHOCYTES ABSOLUTE: 2.21 E9/L (ref 1.5–4)
LYMPHOCYTES RELATIVE PERCENT: 30.6 % (ref 20–42)
MCH RBC QN AUTO: 31.4 PG (ref 26–35)
MCHC RBC AUTO-ENTMCNC: 31.6 % (ref 32–34.5)
MCV RBC AUTO: 99.4 FL (ref 80–99.9)
MONOCYTES ABSOLUTE: 0.57 E9/L (ref 0.1–0.95)
MONOCYTES RELATIVE PERCENT: 7.9 % (ref 2–12)
NEUTROPHILS ABSOLUTE: 4.1 E9/L (ref 1.8–7.3)
NEUTROPHILS RELATIVE PERCENT: 56.7 % (ref 43–80)
PDW BLD-RTO: 13.3 FL (ref 11.5–15)
PLATELET # BLD: 231 E9/L (ref 130–450)
PMV BLD AUTO: 12 FL (ref 7–12)
POTASSIUM SERPL-SCNC: 4.5 MMOL/L (ref 3.5–5)
RBC # BLD: 4.81 E12/L (ref 3.8–5.8)
SODIUM BLD-SCNC: 144 MMOL/L (ref 132–146)
T4 FREE: 1.1 NG/DL (ref 0.93–1.7)
TOTAL PROTEIN: 7.2 G/DL (ref 6.4–8.3)
TRIGL SERPL-MCNC: 206 MG/DL (ref 0–149)
TSH SERPL DL<=0.05 MIU/L-ACNC: 3.14 UIU/ML (ref 0.27–4.2)
VITAMIN B-12: 555 PG/ML (ref 211–946)
VITAMIN D 25-HYDROXY: 39 NG/ML (ref 30–100)
VLDLC SERPL CALC-MCNC: 41 MG/DL
WBC # BLD: 7.2 E9/L (ref 4.5–11.5)

## 2021-03-24 ENCOUNTER — CARE COORDINATION (OUTPATIENT)
Dept: CASE MANAGEMENT | Age: 61
End: 2021-03-24

## 2021-04-13 ENCOUNTER — CARE COORDINATION (OUTPATIENT)
Dept: CASE MANAGEMENT | Age: 61
End: 2021-04-13

## 2021-04-13 NOTE — CARE COORDINATION
[facility] Transitions Follow Up Call    2021    Patient: Lynnette Orellana  Patient : 1960   MRN: <[id number]>  Reason for Admission: COVID PNA  Discharge Date: 1/15/21 RARS: Readmission Risk Score: 20         Spoke with: Ramona at Group home    Informed by Alexandr Dyer that pt is doing well, no concerns. Informed of final BPCI-A call. CTN sign off. Care Transitions Subsequent and Final Call    Subsequent and Final Calls  Do you have any ongoing symptoms?: No  Have your medications changed?: No  Do you have any questions related to your medications?: No  Do you currently have any active services?: Yes  Are you currently active with any services?: Other  Do you have any needs or concerns that I can assist you with?: No  Identified Barriers: Impairment  Care Transitions Interventions  Other Interventions: Follow Up  No future appointments.     Brandy Estevez RN

## 2022-02-10 ENCOUNTER — OFFICE VISIT (OUTPATIENT)
Dept: FAMILY MEDICINE CLINIC | Age: 62
End: 2022-02-10
Payer: MEDICARE

## 2022-02-10 VITALS
RESPIRATION RATE: 18 BRPM | BODY MASS INDEX: 27.39 KG/M2 | WEIGHT: 202.21 LBS | SYSTOLIC BLOOD PRESSURE: 92 MMHG | TEMPERATURE: 97.5 F | OXYGEN SATURATION: 98 % | HEIGHT: 72 IN | HEART RATE: 84 BPM | DIASTOLIC BLOOD PRESSURE: 62 MMHG

## 2022-02-10 DIAGNOSIS — G40.909 SEIZURE DISORDER (HCC): ICD-10-CM

## 2022-02-10 DIAGNOSIS — K57.92 DIVERTICULITIS: ICD-10-CM

## 2022-02-10 DIAGNOSIS — G43.719 INTRACTABLE CHRONIC MIGRAINE WITHOUT AURA AND WITHOUT STATUS MIGRAINOSUS: ICD-10-CM

## 2022-02-10 DIAGNOSIS — I95.2 HYPOTENSION DUE TO DRUGS: ICD-10-CM

## 2022-02-10 DIAGNOSIS — Z13.220 SCREENING FOR LIPOID DISORDERS: ICD-10-CM

## 2022-02-10 DIAGNOSIS — Z79.899 ENCOUNTER FOR LONG-TERM CURRENT USE OF MEDICATION: ICD-10-CM

## 2022-02-10 DIAGNOSIS — Z12.5 ENCOUNTER FOR PROSTATE CANCER SCREENING: ICD-10-CM

## 2022-02-10 DIAGNOSIS — R40.0 DAYTIME SOMNOLENCE: ICD-10-CM

## 2022-02-10 DIAGNOSIS — Z90.49 HISTORY OF CHOLECYSTECTOMY: ICD-10-CM

## 2022-02-10 DIAGNOSIS — K44.9 HIATAL HERNIA: ICD-10-CM

## 2022-02-10 DIAGNOSIS — R53.1 WEAKNESS: Primary | ICD-10-CM

## 2022-02-10 DIAGNOSIS — F31.9 BIPOLAR 1 DISORDER (HCC): ICD-10-CM

## 2022-02-10 DIAGNOSIS — E78.00 HYPERCHOLESTEROLEMIA: ICD-10-CM

## 2022-02-10 DIAGNOSIS — K21.9 GASTROESOPHAGEAL REFLUX DISEASE WITHOUT ESOPHAGITIS: ICD-10-CM

## 2022-02-10 DIAGNOSIS — F33.0 MILD EPISODE OF RECURRENT MAJOR DEPRESSIVE DISORDER (HCC): ICD-10-CM

## 2022-02-10 DIAGNOSIS — G91.3 POST-TRAUMATIC HYDROCEPHALUS (HCC): ICD-10-CM

## 2022-02-10 DIAGNOSIS — F02.80 DEMENTIA ASSOCIATED WITH OTHER UNDERLYING DISEASE WITHOUT BEHAVIORAL DISTURBANCE (HCC): ICD-10-CM

## 2022-02-10 DIAGNOSIS — Z13.29 SCREENING FOR THYROID DISORDER: ICD-10-CM

## 2022-02-10 PROCEDURE — 4004F PT TOBACCO SCREEN RCVD TLK: CPT | Performed by: NURSE PRACTITIONER

## 2022-02-10 PROCEDURE — G8419 CALC BMI OUT NRM PARAM NOF/U: HCPCS | Performed by: NURSE PRACTITIONER

## 2022-02-10 PROCEDURE — 3017F COLORECTAL CA SCREEN DOC REV: CPT | Performed by: NURSE PRACTITIONER

## 2022-02-10 PROCEDURE — G8427 DOCREV CUR MEDS BY ELIG CLIN: HCPCS | Performed by: NURSE PRACTITIONER

## 2022-02-10 PROCEDURE — G8482 FLU IMMUNIZE ORDER/ADMIN: HCPCS | Performed by: NURSE PRACTITIONER

## 2022-02-10 PROCEDURE — 99204 OFFICE O/P NEW MOD 45 MIN: CPT | Performed by: NURSE PRACTITIONER

## 2022-02-10 RX ORDER — CLONAZEPAM 0.5 MG/1
0.25 TABLET ORAL 2 TIMES DAILY
Qty: 90 TABLET | Refills: 0 | Status: SHIPPED
Start: 2022-02-10 | End: 2022-04-08 | Stop reason: SDUPTHER

## 2022-02-10 RX ORDER — MIRTAZAPINE 30 MG/1
TABLET, FILM COATED ORAL
COMMUNITY
Start: 2022-01-24 | End: 2022-10-20

## 2022-02-10 RX ORDER — ATORVASTATIN CALCIUM 40 MG/1
40 TABLET, FILM COATED ORAL DAILY
COMMUNITY
Start: 2022-01-24

## 2022-02-10 RX ORDER — AMMONIUM LACTATE 12 G/100G
LOTION TOPICAL PRN
COMMUNITY

## 2022-02-10 RX ORDER — LEVETIRACETAM 500 MG/1
500 TABLET ORAL 2 TIMES DAILY
COMMUNITY
Start: 2022-01-24

## 2022-02-10 SDOH — ECONOMIC STABILITY: FOOD INSECURITY: WITHIN THE PAST 12 MONTHS, YOU WORRIED THAT YOUR FOOD WOULD RUN OUT BEFORE YOU GOT MONEY TO BUY MORE.: NEVER TRUE

## 2022-02-10 SDOH — ECONOMIC STABILITY: FOOD INSECURITY: WITHIN THE PAST 12 MONTHS, THE FOOD YOU BOUGHT JUST DIDN'T LAST AND YOU DIDN'T HAVE MONEY TO GET MORE.: NEVER TRUE

## 2022-02-10 ASSESSMENT — PATIENT HEALTH QUESTIONNAIRE - PHQ9
SUM OF ALL RESPONSES TO PHQ QUESTIONS 1-9: 0
SUM OF ALL RESPONSES TO PHQ9 QUESTIONS 1 & 2: 0
SUM OF ALL RESPONSES TO PHQ QUESTIONS 1-9: 0
1. LITTLE INTEREST OR PLEASURE IN DOING THINGS: 0
2. FEELING DOWN, DEPRESSED OR HOPELESS: 0
SUM OF ALL RESPONSES TO PHQ QUESTIONS 1-9: 0
SUM OF ALL RESPONSES TO PHQ QUESTIONS 1-9: 0

## 2022-02-10 ASSESSMENT — ENCOUNTER SYMPTOMS
CONSTIPATION: 0
ABDOMINAL PAIN: 0
TROUBLE SWALLOWING: 0
VOICE CHANGE: 0
DIARRHEA: 0
BLOOD IN STOOL: 0
CHEST TIGHTNESS: 0
BACK PAIN: 0
VOMITING: 1
WHEEZING: 0
COUGH: 0
SHORTNESS OF BREATH: 0
NAUSEA: 1

## 2022-02-10 ASSESSMENT — SOCIAL DETERMINANTS OF HEALTH (SDOH): HOW HARD IS IT FOR YOU TO PAY FOR THE VERY BASICS LIKE FOOD, HOUSING, MEDICAL CARE, AND HEATING?: NOT HARD AT ALL

## 2022-02-10 NOTE — PROGRESS NOTES
Denver Hollingshead : 1960 Sex: male  Age: 63 y.o. Chief Complaint   Patient presents with    New Patient     Establishment, with problems     Fatigue     no strength ,very weak started for a few weeks ago not getting any better     Nausea & Vomiting     on monday not since then with diarehhea        Assessment and Plan:      Weakness  Comments:  Decrease the Klonipin to . 25 mg bid for two weeks if no real improvement then we will decrease the Topapmax to 100mg in the am and 50 mg in the pm rtn 2 mt  Orders:  -     TSH without Reflex; Future  -     T4; Future  -     T3, Free; Future  Daytime somnolence  -     TSH without Reflex; Future  -     T4; Future  -     T3, Free; Future  Hypotension due to drugs  Comments:  may need to decrease the Topamax by 50 mg   Orders:  -     CBC Auto Differential; Future  -     Comprehensive Metabolic Panel, Fasting; Future  -     Urinalysis; Future  Gastroesophageal reflux disease without esophagitis  Seizure disorder [facility])  Assessment & Plan:  Decrease the Klonipin to . 25 mg bid for two weeks if no real improvement then we will decrease the Topapmax to 100mg in the am and 50 mg in the pm rtn 2 mt  Orders:  -     Urinalysis; Future  -     PHENOBARBITAL LEVEL; Future  -     TOPIRAMATE LEVEL; Future  -     LEVETIRACETAM LEVEL; Future  -     clonazePAM (KLONOPIN) 0.5 MG tablet; Take 0.5 tablets by mouth 2 times daily for 90 days. , Disp-90 tablet, R-0Normal  Post-traumatic hydrocephalus (HCC)  Assessment & Plan:     Mild episode of recurrent major depressive disorder ([facility] [address]  Bipolar 1 disorder ([facility] [address]  Encounter for prostate cancer screening  -     PSA screening; Future  Screening for lipoid disorders  -     Lipid Panel; Future  Screening for thyroid disorder  -     TSH without Reflex;  Future  -     T4; Future  -     T3, Free; Future  Encounter for long-term current use of medication  -     CBC Auto Differential; Future  -     Comprehensive Metabolic Panel, Fasting; Future  -     Urinalysis; Future  -     PHENOBARBITAL LEVEL; Future  -     TOPIRAMATE LEVEL; Future  -     LEVETIRACETAM LEVEL; Future  Dementia associated with other underlying disease without behavioral disturbance (HCC)  Hypercholesterolemia  -     Lipid Panel; Future  History of cholecystectomy  Diverticulitis  Hiatal hernia  Intractable chronic migraine without aura and without status migrainosus    USPTF:    (  ) Abnormal Blood Glucose and Type 2 Diabetes Mellitus: Screening -- Adults aged 36 to 79 years who are overweight or obese Grade: B (Recommended)    (B/P 92/62) High Blood Pressure: Screening and Home Monitoring -- Adults  Grade: A (Recommended) recommends screening for high blood pressure in ages 25 years or older. obtain measurements outside of the clinical setting for diagnostic confirmation before starting treatment. Annual screening for adults aged 36 years or older or those who are at increased risk for blood pressure    (  ) Colorectal Cancer: Screening --Adults aged 48 to 76 years  Grade: A (Recommended) recommends screening for colorectal cancer starting at age 48 years and continuing until age 76 years. (  )  Lipid Disorders in Adults: Screening -- Men 28 and Older  Grade: A (Recommended) recommends screening men aged 28 and older for lipid disorders. (Non Drinker) Alcohol Misuse: Screening and Behavioral Counseling Interventions in Primary Care -- Adults  Grade: B (Recommended) recommends that clinicians screen adults aged 25 years or older for alcohol misuse and provide persons engaged in risky or hazardous drinking with brief behavioral counseling interventions to reduce alcohol misuse. (BMI 27.34)  Obesity: Screening for and Management of-- All Adults  Grade: B(Recommended) recommends screening all adults for obesity. Clinicians should offer or refer patients with a body mass index (BMI) of 30 kg/m2 or higher to intensive, multicomponent behavioral interventions.         (Not a fall risk)  Fall Prevention -- Exercise/Physical Therapy: Community-dwelling Adults 72 Years or Older, Increased Risk for Falls   Grade: B (Recommended) recommends exercise or physical therapy to prevent falls in community-dwelling adults aged 72 years or older who are at increased risk for falls. (No new symptoms noted or reported today)  Depression: Screening -- General adult population, including pregnant and postpartum women  Grade: B(Recommended) recommends screening for depression in the general adult population,  Screening should be implemented with adequate systems in place to ensure accurate diagnosis, effective treatment, and appropriate follow-up. (  ) Glaucoma: Screening - Adults and Diabetic Eye Exam     (  ) Thyroid Dysfunction: Screening --      (  ) Prostate Cancer: Prostate-Specific Antigen (PSA)-Based Screening -- All Men  PSA     (  ) Vitamin D Deficiency: Screening --      (  ) Skin Cancer: Screening --Asymptomatic adults Grade: I(Uncertain)     (  ) Carotid Artery Stenosis: Screening -- Adults Grade: D (Not Recommended)     (  ) Coronary Heart Disease: Screening with Electrocardiography--Adults at Low Risk Grade: D (Not Recommended)       Educational materials  printed for patient's review and were included in patient instructions on his After Visit Summary and given to patient at the end of visit. Counseled regarding above diagnosis, including possible risks and complications,  especially if left uncontrolled. Counseled regarding the possible side effects, risks, benefits and alternatives to treatment; patient and/or guardian verbalizes understanding, agrees, feels comfortable with and wishes to proceed with above treatment plan. Advised patient to call with any new medication issues, and read all Rx info from pharmacy to assure aware of all possible risks and side effects of medication before taking.      Reviewed age and gender appropriate health screening exams and vaccinations. Advised patient regarding importance of keeping up with recommended health maintenance and to schedule as soon as possible if overdue, as this is important in assessing for undiagnosed pathology, especially cancer, as well as protecting against potentially harmful/life threatening disease. Patient verbalizes understanding and agrees with above counseling, assessment and plan. All questions answered. Return in about 8 weeks (around 4/7/2022). This is a very pleasant 51-year-old white male who is a new patient to me who is on a multitude of medications for seizure bipolar disorder and has been having very low blood pressures and orthostatic nausea and vomiting at times with daytime somnolence. They deny any new injuries or fevers. No abdominal pain reported. It is reported to be that he is having some trouble sleeping at night but there is no staff in the house to confirm this. 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 Roxy Lyles presents today for evaluation and management of chronic medical problems. Current medication list reviewed. The patient is tolerating all medications but seems to be oversedated here at this visit there does not seem to be any reports of adverse events or known side effects. The patient does understand the risk and benefits of the prescribed medications. The patient is not up-to-date on all age-appropriate wellness issues. Patient denies any reccent hospitalizations or ER visit. No Acute Complaints reported:      CHRONIC CONDITION:      Hyperlipidemia: Mild in intensity but controlled on diet, without symptoms, no complications with dietary treatment regimen reporting no side effects or intolereances. Compliant with treatment and diet. No muscle aches, new joint pains or abd pain. Hypothyroid: Remains controlled, without symptoms, on * with no evidence of weight gain, palpitations, fatigue, difficulty swallowing, neck prominence or hair loss. Is better on medications and worsens with forgetting to take medications. Has been on medications religiously since last visit. Depression/Anxiety/SZ disorder/Migrains and Dementia: Stable depression with generalized anxiety. Mild in intensity but well controlled on   levETIRAcetam (KEPPRA) 500 MG tablet, , Disp: , Rfl:   mirtazapine (REMERON) 30 MG tablet, , Disp: , Rfl:   ondansetron (ZOFRAN) 4 MG tablet, Take 4 mg by mouth every 8 hours as needed for Nausea or Vomiting, Disp: , Rfl:   memantine (NAMENDA) 10 MG tablet, Take 10 mg by mouth 2 times daily, Disp: , Rfl:   metoclopramide (REGLAN) 5 MG tablet, Take 5 mg by mouth 4 times daily, Disp: , Rfl:   omeprazole (PRILOSEC) 20 MG delayed release capsule, Take 40 mg by mouth daily, Disp: , Rfl:   rivastigmine (EXELON) 6 MG capsule, Take 6 mg by mouth 2 times daily, Disp: , Rfl:   rizatriptan (MAXALT) 10 MG tablet, Take 10 mg by mouth once as needed for Migraine May repeat in 2 hours if needed, Disp: , Rfl:   clonazePAM (KLONOPIN) 0.5 MG tablet, Take 0.5 mg by mouth 2 times daily. ., Disp: , Rfl:     fluticasone (FLONASE) 50 MCG/ACT nasal spray, 1 spray by Nasal route daily, Disp: , Rfl:   PHENobarbital (LUMINAL) 60 MG tablet, Take 60 mg by mouth 3 times daily. ., Disp: , Rfl:    topiramate (TOPAMAX) 100 MG tablet, Take 100 mg by mouth 2 times daily, Disp: , Rfl:    cyanocobalamin 1000 MCG/ML injection, Inject 1,000 mcg into the muscle every 30 days, Disp: , Rfl:    Cariprazine HCl (VRAYLAR) 3 MG CAPS, Take by mouth nightly, Disp: , Rfl: , without symptoms, no weight gain, no increase in anxiety, no suicidal or homicidal ideation's no unexplained fatigue, or relationship difficulties relayed this visit. Review of Systems   Constitutional: Negative for activity change, chills, diaphoresis, fatigue, fever and unexpected weight change. HENT: Negative for trouble swallowing and voice change. Eyes: Negative for visual disturbance.    Respiratory: Negative for cough, chest tightness, shortness of breath and wheezing. Cardiovascular: Negative for chest pain, palpitations and leg swelling. Gastrointestinal: Positive for nausea and vomiting. Negative for abdominal pain, blood in stool, constipation and diarrhea. Endocrine: Negative for polydipsia, polyphagia and polyuria. Genitourinary: Negative for dysuria, enuresis, frequency and hematuria. Musculoskeletal: Positive for gait problem. Negative for arthralgias, back pain, joint swelling, myalgias and neck stiffness. Skin: Negative for rash. Neurological: Positive for weakness and headaches. Negative for dizziness, seizures, syncope, facial asymmetry, light-headedness and numbness. Hematological: Does not bruise/bleed easily. Psychiatric/Behavioral: Positive for confusion and decreased concentration. Negative for behavioral problems, hallucinations and suicidal ideas. The patient is not nervous/anxious. Current Outpatient Medications:     atorvastatin (LIPITOR) 40 MG tablet, , Disp: , Rfl:     levETIRAcetam (KEPPRA) 500 MG tablet, , Disp: , Rfl:     mirtazapine (REMERON) 30 MG tablet, , Disp: , Rfl:     ammonium lactate (LAC-HYDRIN) 12 % lotion, Apply topically as needed for Dry Skin Apply topically to both feet at bedtime, Disp: , Rfl:     diclofenac sodium (VOLTAREN) 1 % GEL, Apply 3 g topically 2 times daily, Disp: , Rfl:     clonazePAM (KLONOPIN) 0.5 MG tablet, Take 0.5 tablets by mouth 2 times daily for 90 days. , Disp: 90 tablet, Rfl: 0    acetaminophen (TYLENOL) 325 MG tablet, acetaminophen acetaminophen 325 mg tablet 2 tablet(s) Every 6 hours as needed for pain as needed for pain By Christo ([id number]), Disp: , Rfl:     ondansetron (ZOFRAN) 4 MG tablet, Take 4 mg by mouth every 8 hours as needed for Nausea or Vomiting, Disp: , Rfl:     calcium carbonate (TUMS) 500 MG chewable tablet, Take 1 tablet by mouth daily, Disp: , Rfl:    memantine (NAMENDA) 10 MG tablet, Take 10 mg by mouth 2 times daily, Disp: , Rfl:     metoclopramide (REGLAN) 5 MG tablet, Take 5 mg by mouth 4 times daily, Disp: , Rfl:     omeprazole (PRILOSEC) 20 MG delayed release capsule, Take 40 mg by mouth daily, Disp: , Rfl:     rivastigmine (EXELON) 6 MG capsule, Take 6 mg by mouth 2 times daily, Disp: , Rfl:     rizatriptan (MAXALT) 10 MG tablet, Take 10 mg by mouth once as needed for Migraine May repeat in 2 hours if needed, Disp: , Rfl:     fluticasone (FLONASE) 50 MCG/ACT nasal spray, 1 spray by Nasal route daily, Disp: , Rfl:     Multiple Vitamins-Minerals (THERAPEUTIC MULTIVITAMIN-MINERALS) tablet, Take 1 tablet by mouth daily, Disp: , Rfl:     PHENobarbital (LUMINAL) 60 MG tablet, Take 60 mg by mouth 3 times daily. ., Disp: , Rfl:     cycloSPORINE (RESTASIS) 0.05 % ophthalmic emulsion, Place 1 drop into both eyes every 12 hours, Disp: , Rfl:     topiramate (TOPAMAX) 100 MG tablet, Take 100 mg by mouth 2 times daily, Disp: , Rfl:     cyanocobalamin 1000 MCG/ML injection, Inject 1,000 mcg into the muscle every 30 days, Disp: , Rfl:     vitamin D (ERGOCALCIFEROL) 33795 units CAPS capsule, Take 50,000 Units by mouth once a week, Disp: , Rfl:     vitamin E 400 UNIT capsule, Take 400 Units by mouth daily, Disp: , Rfl:     Cariprazine HCl (VRAYLAR) 3 MG CAPS, Take by mouth nightly, Disp: , Rfl:   Allergies   Allergen Reactions    Asa [Aspirin]     Augmentin [Amoxicillin-Pot Clavulanate]     Pcn [Penicillins]        Past Medical History:   Diagnosis Date    Bipolar 1 disorder ([facility] [address]     Depression     GERD (gastroesophageal reflux disease)     Hydrocephaly ([facility])     Seizure disorder ([facility] [address]      History reviewed. No pertinent surgical history. History reviewed. No pertinent family history.   Social History     Socioeconomic History    Marital status: Single     Spouse name: Not on file    Number of children: Not on file    Years of education: Not on file  Highest education level: Not on file   Occupational History    Not on file   Tobacco Use    Smoking status: Current Every Day Smoker     Packs/day: 0.25     Years: 43.00     Pack years: 10.75     Types: Cigars    Smokeless tobacco: Never Used   Substance and Sexual Activity    Alcohol use: No    Drug use: No    Sexual activity: Not on file   Other Topics Concern    Not on file   Social History Narrative    Not on file     Social Determinants of Health     Financial Resource Strain: Low Risk     Difficulty of Paying Living Expenses: Not hard at all   Food Insecurity: No Food Insecurity    Worried About Running Out of Food in the Last Year: Never true    [place] of Food in the Last Year: Never true   Transportation Needs:     Lack of Transportation (Medical): Not on file    Lack of Transportation (Non-Medical):  Not on file   Physical Activity:     Days of Exercise per Week: Not on file    Minutes of Exercise per Session: Not on file   Stress:     Feeling of Stress : Not on file   Social Connections:     Frequency of Communication with Friends and Family: Not on file    Frequency of Social Gatherings with Friends and Family: Not on file    Attends Oriental orthodox Services: Not on file    Active Member of [address] [place] [address] or Organizations: Not on file    Attends Club or Organization Meetings: Not on file    Marital Status: Not on file   Intimate Partner Violence:     Fear of Current or Ex-Partner: Not on file    Emotionally Abused: Not on file    Physically Abused: Not on file    Sexually Abused: Not on file   Housing Stability:     Unable to Pay for Housing in the Last Year: Not on file    Number of [place] in the Last Year: Not on file    Unstable Housing in the Last Year: Not on file       Vitals:    02/10/22 1117   BP: 92/62   Pulse: 84   Resp: 18   Temp: 97.5 °F (36.4 °C)   TempSrc: Temporal   SpO2: 98%   Weight: 202 lb 3.4 oz (91.7 kg)   Height: 6' (1.829 m)       Physical Exam  Vitals and nursing note reviewed. Constitutional:       Appearance: Normal appearance. HENT:      Head: Normocephalic. Right Ear: Tympanic membrane and ear canal normal. There is no impacted cerumen. Left Ear: Tympanic membrane and ear canal normal. There is no impacted cerumen. Nose: Nose normal.      Mouth/Throat:      Mouth: Mucous membranes are dry. Eyes:      Extraocular Movements: Extraocular movements intact. Pupils: Pupils are equal, round, and reactive to light. Neck:      Vascular: No carotid bruit. Cardiovascular:      Rate and Rhythm: Normal rate and regular rhythm. Pulses: Normal pulses. Heart sounds: Normal heart sounds. No murmur heard. No friction rub. No gallop. Pulmonary:      Effort: Pulmonary effort is normal. No respiratory distress. Breath sounds: Normal breath sounds. No stridor. No wheezing, rhonchi or rales. Chest:      Chest wall: No tenderness. Abdominal:      General: Bowel sounds are normal. There is no distension. Palpations: Abdomen is soft. Musculoskeletal:         General: No swelling, tenderness, deformity or signs of injury. Cervical back: No rigidity. No muscular tenderness. Right lower leg: No edema. Left lower leg: No edema. Lymphadenopathy:      Cervical: No cervical adenopathy. Skin:     General: Skin is warm and dry. Capillary Refill: Capillary refill takes 2 to 3 seconds. Findings: No bruising, lesion or rash. Neurological:      General: No focal deficit present. Mental Status: He is alert and oriented to person, place, and time. Motor: No weakness. Gait: Gait normal.   Psychiatric:         Attention and Perception: Attention normal.         Mood and Affect: Mood normal.         Behavior: Behavior normal.         Thought Content: Thought content does not include homicidal or suicidal ideation. Thought content does not include homicidal or suicidal plan.                 Seen By:  RAKESH Deutsch - CNP

## 2022-02-10 NOTE — PATIENT INSTRUCTIONS
Patient Education        Learning About Diverticulosis and Diverticulitis  What are diverticulosis and diverticulitis? In diverticulosis and diverticulitis, pouches called diverticula form in the wall of the large intestine, or colon. · In diverticulosis, the pouches do not cause any pain or other symptoms. · In diverticulitis, the pouches get inflamed or infected and cause symptoms. Doctors aren't sure what causes these pouches in the colon. But they think that a low-fiber diet may play a role. Without fiber to add bulk to the stool, the colon has to work harder than normal to push the stool forward. The pressure from this may cause pouches to form in weak spots along the colon. Some people with diverticulosis get diverticulitis. But experts don't know why this happens. What are the symptoms? · In diverticulosis, most people don't have symptoms. But pouches sometimes bleed. · In diverticulitis, symptoms may last from a few hours to a week or more. They include:  ? Belly pain. This is usually in the lower left side. It is sometimes worse when you move. This is the most common symptom. ? Fever and chills. ? Bloating and gas. ? Diarrhea or constipation. ? Nausea and sometimes vomiting.  ? Not feeling like eating. How can you prevent diverticulitis? You may be able to lower your chance of getting diverticulitis. You can do this by taking steps to prevent constipation. · Eat fruits, vegetables, beans, and whole grains every day. These foods are high in fiber. · Drink plenty of fluids. If you have kidney, heart, or liver disease and have to limit fluids, talk with your doctor before you increase the amount of fluids you drink. · Get at least 30 minutes of exercise on most days of the week. Walking is a good choice. You also may want to do other activities, such as running, swimming, cycling, or playing tennis or team sports.   · Take a fiber supplement, such as Citrucel or Metamucil, every day if needed. Read and follow all instructions on the label. · Schedule time each day for a bowel movement. Having a daily routine may help. Take your time and do not strain when having a bowel movement. Some people avoid nuts, seeds, berries, and popcorn. They believe that these foods might get trapped in the diverticula and cause pain. But there is no proof that these foods cause diverticulitis or make it worse. How are these problems treated? · The best way to treat diverticulosis is to avoid constipation. · Treatment for diverticulitis includes antibiotics. It often includes a change in your diet. You may need only liquids at first. Your doctor may suggest pain medicines for pain or belly cramps. In some cases, surgery may be needed. Follow-up care is a key part of your treatment and safety. Be sure to make and go to all appointments, and call your doctor if you are having problems. It's also a good idea to know your test results and keep a list of the medicines you take. Where can you learn more? Go to https://SpeakingPal.RuffaloCODY. org and sign in to your [employer] account. Enter A549 in the Xand box to learn more about \"Learning About Diverticulosis and Diverticulitis. \"     If you do not have an account, please click on the \"Sign Up Now\" link. Current as of: September 8, 2021               Content Version: 13.1  © 0837-9254 Healthwise, Incorporated. Care instructions adapted under license by [facility] ([facility]). If you have questions about a medical condition or this instruction, always ask your healthcare professional. [place] [address] any warranty or liability for your use of this information. Patient Education        Fatigue: Care Instructions  Your Care Instructions     Fatigue is a feeling of tiredness, exhaustion, or lack of energy. You may feel fatigue because of too much or not enough activity.  It can also come from stress, lack of sleep, boredom, and poor diet. Many medical problems, such as viral infections, can cause fatigue. Emotional problems, especially depression, are often the cause of fatigue. Fatigue is most often a symptom of another problem. Treatment for fatigue depends on the cause. For example, if you have fatigue because you have a certain health problem, treating this problem also treats your fatigue. If depression or anxiety is the cause, treatment may help. Follow-up care is a key part of your treatment and safety. Be sure to make and go to all appointments, and call your doctor if you are having problems. It's also a good idea to know your test results and keep a list of the medicines you take. How can you care for yourself at home? · Get regular exercise. But don't overdo it. Go back and forth between rest and exercise. · Get plenty of rest.  · Eat a healthy diet. Do not skip meals, especially breakfast.  · Reduce your use of caffeine, tobacco, and alcohol. Caffeine is most often found in coffee, tea, cola drinks, and chocolate. · Limit medicines that can cause fatigue. This includes tranquilizers and cold and allergy medicines. When should you call for help? Watch closely for changes in your health, and be sure to contact your doctor if:    · You have new symptoms such as fever or a rash.     · Your fatigue gets worse.     · You have been feeling down, depressed, or hopeless. Or you may have lost interest in things that you usually enjoy.     · You are not getting better as expected. Where can you learn more? Go to https://Humansized.Open-Xchange. org and sign in to your Unocoin account. Enter B035 in the [facility] box to learn more about \"Fatigue: Care Instructions. \"     If you do not have an account, please click on the \"Sign Up Now\" link. Current as of: July 1, 2021               Content Version: 13.1  © 0236-7704 Healthwise, Incorporated. Care instructions adapted under license by [facility] ([facility]).  If you have questions about a medical condition or this instruction, always ask your healthcare professional. [address] any warranty or liability for your use of this information. Patient Education        Migraine Headache: Care Instructions  Overview     Migraines are painful, throbbing headaches that often start on one side of the head. They may cause nausea and vomiting and make you sensitive to light, sound, or smell. Without treatment, migraines can last from 4 hours to a few days. Medicines can help prevent migraines or stop them after they have started. Your doctor can help you find which ones work best for you. Follow-up care is a key part of your treatment and safety. Be sure to make and go to all appointments, and call your doctor if you are having problems. It's also a good idea to know your test results and keep a list of the medicines you take. How can you care for yourself at home? · Do not drive if you have taken a prescription pain medicine. · Rest in a quiet, dark room until your headache is gone. Close your eyes, and try to relax or go to sleep. Don't watch TV or read. · Put a cold, moist cloth or cold pack on the painful area for 10 to 20 minutes at a time. Put a thin cloth between the cold pack and your skin. · Use a warm, moist towel or a heating pad set on low to relax tight shoulder and neck muscles. · Have someone gently massage your neck and shoulders. · Take your medicines exactly as prescribed. Call your doctor if you think you are having a problem with your medicine. You will get more details on the specific medicines your doctor prescribes. · Don't take medicine for headache pain too often. Talk to your doctor if you are taking medicine more than 2 days a week to stop a headache. Taking too much pain medicine can lead to more headaches. These are called medicine-overuse headaches.   To prevent migraines  · Keep a headache diary so you can figure out what triggers your headaches. Avoiding triggers may help you prevent headaches. Record when each headache began, how long it lasted, and what the pain was like. Write down any other symptoms you had with the headache, such as nausea, flashing lights or dark spots, or sensitivity to bright light or loud noise. Note if the headache occurred near your period. List anything that might have triggered the headache. Triggers may include certain foods (chocolate, cheese, wine) or odors, smoke, bright light, stress, or lack of sleep. · If your doctor has prescribed medicine for your migraines, take it as directed. You may have medicine that you take only when you get a migraine and medicine that you take all the time to help prevent migraines. ? If your doctor has prescribed medicine for when you get a headache, take it at the first sign of a migraine, unless your doctor has given you other instructions. ? If your doctor has prescribed medicine to prevent migraines, take it exactly as prescribed. Call your doctor if you think you are having a problem with your medicine. · Find healthy ways to deal with stress. Migraines are most common during or right after stressful times. Try finding ways to reduce stress like practicing mindfulness or deep breathing exercises. · Get plenty of sleep and exercise. But be careful to not push yourself too hard during exercise. It may trigger a headache. · Eat meals on a regular schedule. Avoid foods and drinks that often trigger migraines. These include chocolate, alcohol (especially red wine and port), aspartame, monosodium glutamate (MSG), and some additives found in foods (such as hot dogs, fry, cold cuts, aged cheeses, and pickled foods). · Limit caffeine. Don't drink too much coffee, tea, or soda. But don't quit caffeine suddenly. That can also give you migraines. · Do not smoke or allow others to smoke around you.  If you need help quitting, talk to your doctor about last from 4 to 72 hours. They often occur on only one side of your head. But you may feel them on both sides. The pain may keep you from doing your daily activities. You may take a daily medicine if you get bad migraines often. This can help prevent them. What are key points about this decision? · Medicines to prevent migraines may not stop them every time. But if you take them daily, you can reduce how many migraines you get by more than half. They can also reduce how long migraines last. And your symptoms may not be as bad. · Medicines that prevent migraines may cause side effects. You may have sleep and memory problems, upset stomach, dry mouth, or constipation. Some of these side effects may last for as long as you take the medicine. Or they may go away within a few weeks. Why might you choose to take medicine to prevent migraines? · You are willing to take medicine daily if it will help your symptoms. · You don't think the side effects of the medicine could be as bad as your migraine symptoms. · Your migraines get in the way of your work. Or they harm your relationships with friends and family. · Benefits of medicine include fewer or no migraines. And your migraines may not last as long or feel as bad. Why might you choose not to take medicine to prevent migraines? · You want to avoid the side effects of the medicine. · You don't want to take medicine every day. · Your migraines are not affecting your work and relationships. · If your symptoms don't improve with home treatment and other medicines, you can decide later to take medicine every day to help prevent migraines. Your decision  Thinking about the facts and your feelings can help you make a decision that is right for you. Be sure you understand the benefits and risks of your options, and think about what else you need to do before you make the decision. Where can you learn more? Go to https://chroqueeb.health-partners. org and sign in to your 365looks account. Enter N924 in the [facility] box to learn more about \"Deciding About Taking Medicine to Prevent Migraines. \"     If you do not have an account, please click on the \"Sign Up Now\" link. Current as of: April 8, 2021               Content Version: 13.1  © 2006-2021 [employer]. Care instructions adapted under license by [facility] ([facility]). If you have questions about a medical condition or this instruction, always ask your healthcare professional. [address] any warranty or liability for your use of this information. Patient Education        Prostate Cancer Screening: Care Instructions  Overview     Prostate cancer is the abnormal growth of cells in the prostate. This is a small organ below the bladder that makes fluid for semen. Screening can help find prostate cancer early. When it's found and treated early, the cancer may be cured. But it's not always treated. That's because the treatments can cause serious side effects. In most cases, prostate cancer isn't life-threatening. This is especially true in someone who is older and when the cancer grows slowly. Prostate cancer is a common type of cancer. Most cases occur after age 72. The disease runs in families. And it's more common in  Americans. Follow-up care is a key part of your treatment and safety. Be sure to make and go to all appointments, and call your doctor if you are having problems. It's also a good idea to know your test results and keep a list of the medicines you take. What is the screening test for prostate cancer? The main screening test for prostate cancer is the prostate-specific antigen (PSA) test. This is a blood test that measures how much PSA is in your blood. A high level may mean that you have an enlarged prostate, an infection, or cancer.   Along with the PSA test, you may have a digital (finger) rectal exam. This exam checks for anything abnormal in your prostate. To do the exam, the doctor puts a lubricated, gloved finger into your rectum. If these tests suggest cancer, you may need a prostate biopsy. How is prostate cancer diagnosed? In a biopsy, the doctor takes small tissue samples from your prostate gland. Another doctor then looks at the tissue under a microscope to see if there are cancer cells, signs of infection, or other problems. The results help diagnose prostate cancer. What are the pros and cons of screening? Neither a PSA test nor a digital rectal exam can tell you for sure that you do or do not have cancer. But they can help you decide if you need more tests, such as a prostate biopsy. Screening tests may be useful because prostate cancer often doesn't cause symptoms. It can be hard to know if you have cancer until it's more advanced. And then it's harder to treat. But having a PSA test can also cause harm. The test may show high levels of PSA that aren't caused by cancer. So you could have a prostate biopsy you didn't need. Or the PSA test might be normal when there is cancer, so a cancer might not be found early. The test can also find cancers that would never have caused a problem during your lifetime. So you might have treatment that wasn't needed. Prostate cancer usually develops late in life and grows slowly. In most cases, it doesn't shorten lives. Some experts advise screening only if you are at high risk. Talk with your doctor to see if screening is right for you. Where can you learn more? Go to https://RobotronicapeirvingFinAnalytica.I Love QC. org and sign in to your Crush on original products account. Enter R550 in the [employer] box to learn more about \"Prostate Cancer Screening: Care Instructions. \"     If you do not have an account, please click on the \"Sign Up Now\" link. Current as of: September 8, 2021               Content Version: 13.1  © 5895-8140 Healthwise, DescribeMe. Care instructions adapted under license by [facility] ([facility]).  If you have questions about a medical condition or this instruction, always ask your healthcare professional. [place] [address] any warranty or liability for your use of this information.

## 2022-02-10 NOTE — ASSESSMENT & PLAN NOTE
Decrease the Klonipin to . 25 mg bid for two weeks if no real improvement then we will decrease the Topapmax to 100mg in the am and 50 mg in the pm rtn 2 mt

## 2022-02-21 ENCOUNTER — TELEPHONE (OUTPATIENT)
Dept: FAMILY MEDICINE CLINIC | Age: 62
End: 2022-02-21

## 2022-04-06 DIAGNOSIS — Z13.29 SCREENING FOR THYROID DISORDER: ICD-10-CM

## 2022-04-06 DIAGNOSIS — Z79.899 ENCOUNTER FOR LONG-TERM CURRENT USE OF MEDICATION: ICD-10-CM

## 2022-04-06 DIAGNOSIS — I95.2 HYPOTENSION DUE TO DRUGS: ICD-10-CM

## 2022-04-06 DIAGNOSIS — Z13.220 SCREENING FOR LIPOID DISORDERS: ICD-10-CM

## 2022-04-06 DIAGNOSIS — R53.1 WEAKNESS: ICD-10-CM

## 2022-04-06 DIAGNOSIS — E78.00 HYPERCHOLESTEROLEMIA: ICD-10-CM

## 2022-04-06 DIAGNOSIS — Z12.5 ENCOUNTER FOR PROSTATE CANCER SCREENING: ICD-10-CM

## 2022-04-06 DIAGNOSIS — G40.909 SEIZURE DISORDER (HCC): ICD-10-CM

## 2022-04-06 DIAGNOSIS — R40.0 DAYTIME SOMNOLENCE: ICD-10-CM

## 2022-04-06 LAB
BASOPHILS ABSOLUTE: 0.04 E9/L (ref 0–0.2)
BASOPHILS RELATIVE PERCENT: 0.5 % (ref 0–2)
BILIRUBIN URINE: NEGATIVE
BLOOD, URINE: NEGATIVE
CLARITY: CLEAR
COLOR: YELLOW
EOSINOPHILS ABSOLUTE: 0.19 E9/L (ref 0.05–0.5)
EOSINOPHILS RELATIVE PERCENT: 2.2 % (ref 0–6)
GLUCOSE URINE: NEGATIVE MG/DL
HCT VFR BLD CALC: 47.8 % (ref 37–54)
HEMOGLOBIN: 15.7 G/DL (ref 12.5–16.5)
IMMATURE GRANULOCYTES #: 0.02 E9/L
IMMATURE GRANULOCYTES %: 0.2 % (ref 0–5)
KETONES, URINE: NEGATIVE MG/DL
LEUKOCYTE ESTERASE, URINE: NEGATIVE
LYMPHOCYTES ABSOLUTE: 3.77 E9/L (ref 1.5–4)
LYMPHOCYTES RELATIVE PERCENT: 42.7 % (ref 20–42)
MCH RBC QN AUTO: 31.3 PG (ref 26–35)
MCHC RBC AUTO-ENTMCNC: 32.8 % (ref 32–34.5)
MCV RBC AUTO: 95.2 FL (ref 80–99.9)
MONOCYTES ABSOLUTE: 0.57 E9/L (ref 0.1–0.95)
MONOCYTES RELATIVE PERCENT: 6.5 % (ref 2–12)
NEUTROPHILS ABSOLUTE: 4.23 E9/L (ref 1.8–7.3)
NEUTROPHILS RELATIVE PERCENT: 47.9 % (ref 43–80)
NITRITE, URINE: NEGATIVE
PDW BLD-RTO: 12.6 FL (ref 11.5–15)
PH UA: 7 (ref 5–9)
PLATELET # BLD: 211 E9/L (ref 130–450)
PMV BLD AUTO: 12.1 FL (ref 7–12)
PROTEIN UA: NEGATIVE MG/DL
RBC # BLD: 5.02 E12/L (ref 3.8–5.8)
SPECIFIC GRAVITY UA: <=1.005 (ref 1–1.03)
UROBILINOGEN, URINE: 0.2 E.U./DL
WBC # BLD: 8.8 E9/L (ref 4.5–11.5)

## 2022-04-07 LAB
ALBUMIN SERPL-MCNC: 4.5 G/DL (ref 3.5–5.2)
ALP BLD-CCNC: 106 U/L (ref 40–129)
ALT SERPL-CCNC: 24 U/L (ref 0–40)
ANION GAP SERPL CALCULATED.3IONS-SCNC: 11 MMOL/L (ref 7–16)
AST SERPL-CCNC: 24 U/L (ref 0–39)
BILIRUB SERPL-MCNC: 0.2 MG/DL (ref 0–1.2)
BUN BLDV-MCNC: 13 MG/DL (ref 6–20)
CALCIUM SERPL-MCNC: 9.3 MG/DL (ref 8.6–10.2)
CHLORIDE BLD-SCNC: 105 MMOL/L (ref 98–107)
CHOLESTEROL, TOTAL: 152 MG/DL (ref 0–199)
CO2: 24 MMOL/L (ref 22–29)
CREAT SERPL-MCNC: 1 MG/DL (ref 0.7–1.2)
GFR AFRICAN AMERICAN: >60
GFR NON-AFRICAN AMERICAN: >60 ML/MIN/1.73
GLUCOSE FASTING: 92 MG/DL (ref 74–99)
HDLC SERPL-MCNC: 64 MG/DL
LDL CHOLESTEROL CALCULATED: 74 MG/DL (ref 0–99)
PHENOBARBITAL LEVEL: 24.1 MCG/ML (ref 15–40)
POTASSIUM SERPL-SCNC: 4.6 MMOL/L (ref 3.5–5)
PROSTATE SPECIFIC ANTIGEN: 0.23 NG/ML (ref 0–4)
SODIUM BLD-SCNC: 140 MMOL/L (ref 132–146)
T3 FREE: 3.1 PG/ML (ref 2–4.4)
T4 TOTAL: 5.5 MCG/DL (ref 4.5–11.7)
TOTAL PROTEIN: 7.1 G/DL (ref 6.4–8.3)
TRIGL SERPL-MCNC: 71 MG/DL (ref 0–149)
TSH SERPL DL<=0.05 MIU/L-ACNC: 3.64 UIU/ML (ref 0.27–4.2)
VLDLC SERPL CALC-MCNC: 14 MG/DL

## 2022-04-08 DIAGNOSIS — G40.909 SEIZURE DISORDER (HCC): ICD-10-CM

## 2022-04-08 RX ORDER — CLONAZEPAM 0.5 MG/1
0.25 TABLET ORAL 2 TIMES DAILY
Qty: 16 TABLET | Refills: 2 | Status: SHIPPED
Start: 2022-04-08 | End: 2022-07-19 | Stop reason: SDUPTHER

## 2022-04-09 LAB
KEPPRA: 12 UG/ML (ref 10–40)
TOPIRAMATE LEVEL: 3.4 UG/ML (ref 5–20)

## 2022-04-13 ENCOUNTER — OFFICE VISIT (OUTPATIENT)
Dept: FAMILY MEDICINE CLINIC | Age: 62
End: 2022-04-13
Payer: MEDICARE

## 2022-04-13 VITALS
TEMPERATURE: 97.7 F | WEIGHT: 203 LBS | SYSTOLIC BLOOD PRESSURE: 98 MMHG | BODY MASS INDEX: 26.9 KG/M2 | HEART RATE: 108 BPM | WEIGHT: 203 LBS | HEIGHT: 73 IN | TEMPERATURE: 97.7 F | DIASTOLIC BLOOD PRESSURE: 60 MMHG | HEIGHT: 73 IN | OXYGEN SATURATION: 95 % | DIASTOLIC BLOOD PRESSURE: 60 MMHG | HEART RATE: 108 BPM | OXYGEN SATURATION: 95 % | BODY MASS INDEX: 26.9 KG/M2 | SYSTOLIC BLOOD PRESSURE: 98 MMHG

## 2022-04-13 DIAGNOSIS — G40.909 SEIZURE DISORDER (HCC): ICD-10-CM

## 2022-04-13 DIAGNOSIS — I95.2 HYPOTENSION DUE TO DRUGS: ICD-10-CM

## 2022-04-13 DIAGNOSIS — K21.9 GASTROESOPHAGEAL REFLUX DISEASE WITHOUT ESOPHAGITIS: ICD-10-CM

## 2022-04-13 DIAGNOSIS — G91.3 POST-TRAUMATIC HYDROCEPHALUS (HCC): ICD-10-CM

## 2022-04-13 DIAGNOSIS — F31.9 BIPOLAR 1 DISORDER (HCC): ICD-10-CM

## 2022-04-13 DIAGNOSIS — F33.0 MILD EPISODE OF RECURRENT MAJOR DEPRESSIVE DISORDER (HCC): ICD-10-CM

## 2022-04-13 DIAGNOSIS — G91.9 HYDROCEPHALUS, UNSPECIFIED TYPE (HCC): ICD-10-CM

## 2022-04-13 DIAGNOSIS — Z79.899 ENCOUNTER FOR LONG-TERM CURRENT USE OF MEDICATION: ICD-10-CM

## 2022-04-13 DIAGNOSIS — F31.9 BIPOLAR 1 DISORDER (HCC): Primary | ICD-10-CM

## 2022-04-13 DIAGNOSIS — R40.0 DAYTIME SOMNOLENCE: ICD-10-CM

## 2022-04-13 DIAGNOSIS — E78.00 HYPERCHOLESTEROLEMIA: ICD-10-CM

## 2022-04-13 DIAGNOSIS — R53.1 WEAKNESS: ICD-10-CM

## 2022-04-13 DIAGNOSIS — Z00.00 INITIAL MEDICARE ANNUAL WELLNESS VISIT: Primary | ICD-10-CM

## 2022-04-13 PROCEDURE — G0438 PPPS, INITIAL VISIT: HCPCS | Performed by: NURSE PRACTITIONER

## 2022-04-13 PROCEDURE — G8427 DOCREV CUR MEDS BY ELIG CLIN: HCPCS | Performed by: NURSE PRACTITIONER

## 2022-04-13 PROCEDURE — 4004F PT TOBACCO SCREEN RCVD TLK: CPT | Performed by: NURSE PRACTITIONER

## 2022-04-13 PROCEDURE — G8419 CALC BMI OUT NRM PARAM NOF/U: HCPCS | Performed by: NURSE PRACTITIONER

## 2022-04-13 PROCEDURE — 99214 OFFICE O/P EST MOD 30 MIN: CPT | Performed by: NURSE PRACTITIONER

## 2022-04-13 PROCEDURE — 3017F COLORECTAL CA SCREEN DOC REV: CPT | Performed by: NURSE PRACTITIONER

## 2022-04-13 ASSESSMENT — ENCOUNTER SYMPTOMS
DIARRHEA: 0
SHORTNESS OF BREATH: 0
COUGH: 0
CHEST TIGHTNESS: 0
TROUBLE SWALLOWING: 0
WHEEZING: 0
BLOOD IN STOOL: 0
NAUSEA: 0
BACK PAIN: 0
VOMITING: 0
CONSTIPATION: 0
ABDOMINAL PAIN: 0
VOICE CHANGE: 0

## 2022-04-13 ASSESSMENT — PATIENT HEALTH QUESTIONNAIRE - PHQ9
SUM OF ALL RESPONSES TO PHQ QUESTIONS 1-9: 0
1. LITTLE INTEREST OR PLEASURE IN DOING THINGS: 0
SUM OF ALL RESPONSES TO PHQ QUESTIONS 1-9: 0
2. FEELING DOWN, DEPRESSED OR HOPELESS: 0
SUM OF ALL RESPONSES TO PHQ9 QUESTIONS 1 & 2: 0

## 2022-04-13 ASSESSMENT — LIFESTYLE VARIABLES: HOW OFTEN DO YOU HAVE A DRINK CONTAINING ALCOHOL: NEVER

## 2022-04-13 NOTE — PROGRESS NOTES
Rayshawn Richter : 1960 Sex: male  Age: 63 y.o. Chief Complaint   Patient presents with    Discuss Labs       Assessment and Plan:      Bipolar 1 disorder ([facility] [place] [address]  -     TOPIRAMATE LEVEL; Future  -     Levetiracetam Level; Future  -     PHENOBARBITAL LEVEL; Future  Seizure disorder ([facility] [place] [address]  -     CBC with Auto Differential; Future  -     Comprehensive Metabolic Panel, Fasting; Future  -     Urinalysis; Future  -     TOPIRAMATE LEVEL; Future  -     PHENOBARBITAL LEVEL; Future  Gastroesophageal reflux disease without esophagitis  Mild episode of recurrent major depressive disorder (HCC)  Hydrocephalus, unspecified type (HCC)  Daytime somnolence  Hypotension due to drugs  Post-traumatic hydrocephalus ([facility] [place] [address]  Encounter for long-term current use of medication  -     CBC with Auto Differential; Future  -     Comprehensive Metabolic Panel, Fasting; Future  -     Urinalysis; Future  -     TOPIRAMATE LEVEL; Future  -     Levetiracetam Level; Future  -     PHENOBARBITAL LEVEL; Future  Hypercholesterolemia  -     Lipid Panel; Future    USPTF:    (BGL 92) Abnormal Blood Glucose and Type 2 Diabetes Mellitus: Screening -- Adults aged 36 to 79 years who are overweight or obese Grade: B (Recommended)    (B/P 98/60) High Blood Pressure: Screening and Home Monitoring -- Adults  Grade: A (Recommended) recommends screening for high blood pressure in ages 25 years or older. obtain measurements outside of the clinical setting for diagnostic confirmation before starting treatment. Annual screening for adults aged 36 years or older or those who are at increased risk for blood pressure    (2020) Colorectal Cancer: Screening --Adults aged 48 to 76 years  Grade: A (Recommended) recommends screening for colorectal cancer starting at age 48 years and continuing until age 76 years.      (, TRI 71, LDL 74)  Lipid Disorders in Adults: Screening -- Men 28 and Older  Grade: A (Recommended) recommends screening men aged 28 and older for lipid disorders. (Non Drinker) Alcohol Misuse: Screening and Behavioral Counseling Interventions in Primary Care -- Adults  Grade: B (Recommended) recommends that clinicians screen adults aged 25 years or older for alcohol misuse and provide persons engaged in risky or hazardous drinking with brief behavioral counseling interventions to reduce alcohol misuse. (BMI 26.78)  Obesity: Screening for and Management of-- All Adults  Grade: B(Recommended) recommends screening all adults for obesity. Clinicians should offer or refer patients with a body mass index (BMI) of 30 kg/m2 or higher to intensive, multicomponent behavioral interventions. (Not a fall risk)  Fall Prevention -- Exercise/Physical Therapy: Community-dwelling Adults 72 Years or Older, Increased Risk for Falls   Grade: B (Recommended) recommends exercise or physical therapy to prevent falls in community-dwelling adults aged 72 years or older who are at increased risk for falls. (No new symptoms noted or reported today)  Depression: Screening -- General adult population, including pregnant and postpartum women  Grade: B(Recommended) recommends screening for depression in the general adult population,  Screening should be implemented with adequate systems in place to ensure accurate diagnosis, effective treatment, and appropriate follow-up. (  ) Glaucoma: Screening - Adults and Diabetic Eye Exam     (TSH 3.64) Thyroid Dysfunction: Screening --      (  ) Prostate Cancer: Prostate-Specific Antigen (PSA)-Based Screening -- All Men  PSA 0.23 April 6 2022           Educational materials  printed for patient's review and were included in patient instructions on his After Visit Summary and given to patient at the end of visit. Counseled regarding above diagnosis, including possible risks and complications,  especially if left uncontrolled.      Counseled regarding the possible side effects, risks, benefits and alternatives to treatment; patient and/or guardian verbalizes understanding, agrees, feels comfortable with and wishes to proceed with above treatment plan. Advised patient to call with any new medication issues, and read all Rx info from pharmacy to assure aware of all possible risks and side effects of medication before taking. Reviewed age and gender appropriate health screening exams and vaccinations. Advised patient regarding importance of keeping up with recommended health maintenance and to schedule as soon as possible if overdue, as this is important in assessing for undiagnosed pathology, especially cancer, as well as protecting against potentially harmful/life threatening disease. Patient verbalizes understanding and agrees with above counseling, assessment and plan. All questions answered. Return in about 6 months (around 10/13/2022) for Routine Visit with Labs. Santos Barcenas  presents today for evaluation and management of chronic medical problems. Current medication list reviewed. The patient is tolerating all medications well without adverse events or known side effects. The patient does understand the risk and benefits of the prescribed medications. The patient is not up-to-date on all age-appropriate wellness issues. Patient denies any reccent hospitalizations or ER visit. No Acute Complaints reported: There are still some reports of daytime somnolence and hypotension without evidence of syncope there is hydration issues and Dr. Mansi Hollingsworth the neurologist wanted to wean him off of the Topamax half a pill a week at a time which has not been done and will start now to see if this helps with the daytime somnolence, dizziness and hypotension.   He wants to retire from the [email or web address] shop because of the difficulty in bending over and would like to go to a ODO    LAST VISIT  This is a very pleasant 82-year-old white male who is a new patient to me who is on a multitude of medications for seizure bipolar disorder and has been having very low blood pressures and orthostatic nausea and vomiting at times with daytime somnolence. They deny any new injuries or fevers. No abdominal pain reported. It is reported to be that he is having some trouble sleeping at night but there is no staff in the house to confirm this. He is a very hard worker at the [employer] shop. Elian Wilkes presents today for evaluation and management of chronic medical problems. Current medication list reviewed. The patient is tolerating all medications but seems to be oversedated here at this visit there does not seem to be any reports of adverse events or known side effects. The patient does understand the risk and benefits of the prescribed medications. The patient is not up-to-date on all age-appropriate wellness issues. Patient denies any reccent hospitalizations or ER visit. No Acute Complaints reported:      CHRONIC CONDITION:      Hyperlipidemia: Mild in intensity but controlled on diet, without symptoms, no complications with dietary treatment regimen reporting no side effects or intolereances. Compliant with treatment and diet. No muscle aches, new joint pains or abd pain. Hypothyroid: Remains controlled, without symptoms, on * with no evidence of weight gain, palpitations, fatigue, difficulty swallowing, neck prominence or hair loss. Is better on medications and worsens with forgetting to take medications. Has been on medications religiously since last visit. Depression/Anxiety/SZ disorder/Migrains and Dementia: Stable depression with generalized anxiety.  Mild in intensity but well controlled on   levETIRAcetam (KEPPRA) 500 MG tablet, , Disp: , Rfl:   mirtazapine (REMERON) 30 MG tablet, , Disp: , Rfl:   ondansetron (ZOFRAN) 4 MG tablet, Take 4 mg by mouth every 8 hours as needed for Nausea or Vomiting, Disp: , Rfl:   memantine (NAMENDA) 10 MG tablet, Take 10 mg by mouth 2 times daily, Disp: , Rfl:   metoclopramide (REGLAN) 5 MG tablet, Take 5 mg by mouth 4 times daily, Disp: , Rfl:   omeprazole (PRILOSEC) 20 MG delayed release capsule, Take 40 mg by mouth daily, Disp: , Rfl:   rivastigmine (EXELON) 6 MG capsule, Take 6 mg by mouth 2 times daily, Disp: , Rfl:   rizatriptan (MAXALT) 10 MG tablet, Take 10 mg by mouth once as needed for Migraine May repeat in 2 hours if needed, Disp: , Rfl:   clonazePAM (KLONOPIN) 0.5 MG tablet, Take 0.5 mg by mouth 2 times daily. ., Disp: , Rfl:     fluticasone (FLONASE) 50 MCG/ACT nasal spray, 1 spray by Nasal route daily, Disp: , Rfl:   PHENobarbital (LUMINAL) 60 MG tablet, Take 60 mg by mouth 3 times daily. ., Disp: , Rfl:    topiramate (TOPAMAX) 100 MG tablet, Take 100 mg by mouth 2 times daily, Disp: , Rfl:    cyanocobalamin 1000 MCG/ML injection, Inject 1,000 mcg into the muscle every 30 days, Disp: , Rfl:    Cariprazine HCl (VRAYLAR) 3 MG CAPS, Take by mouth nightly, Disp: , Rfl: , without symptoms, no weight gain, no increase in anxiety, no suicidal or homicidal ideation's no unexplained fatigue, or relationship difficulties relayed this visit. Review of Systems   Constitutional: Negative for activity change, chills, diaphoresis, fatigue, fever and unexpected weight change. HENT: Negative for trouble swallowing and voice change. Eyes: Negative for visual disturbance. Respiratory: Negative for cough, chest tightness, shortness of breath and wheezing. Cardiovascular: Negative for chest pain, palpitations and leg swelling. Gastrointestinal: Negative for abdominal pain, blood in stool, constipation, diarrhea, nausea and vomiting. Endocrine: Negative for polydipsia, polyphagia and polyuria. Genitourinary: Negative for dysuria, enuresis, frequency and hematuria. Musculoskeletal: Negative for arthralgias, back pain, gait problem, joint swelling, myalgias and neck stiffness. Skin: Negative for rash.    Neurological: Negative for dizziness, seizures, syncope, facial asymmetry, weakness, light-headedness and numbness. Hematological: Does not bruise/bleed easily. Psychiatric/Behavioral: Negative for behavioral problems, confusion, decreased concentration, hallucinations and suicidal ideas. The patient is not nervous/anxious. Current Outpatient Medications:     alum Hydroxide-Mag Carbonate 160-105 MG CHEW, Take 1 tablet by mouth 2 times daily Takes 1 tab after breakfast and at bedtime, Disp: , Rfl:     clonazePAM (KLONOPIN) 0.5 MG tablet, Take 0.5 tablets by mouth 2 times daily for 30 days. (Patient taking differently: Take 0.25 mg by mouth 2 times daily.  Takes 0.25 mg bid), Disp: 16 tablet, Rfl: 2    atorvastatin (LIPITOR) 40 MG tablet, Take 40 mg by mouth daily , Disp: , Rfl:     levETIRAcetam (KEPPRA) 500 MG tablet, Take 500 mg by mouth 2 times daily , Disp: , Rfl:     mirtazapine (REMERON) 30 MG tablet, , Disp: , Rfl:     ammonium lactate (LAC-HYDRIN) 12 % lotion, Apply topically as needed for Dry Skin Apply topically to both feet at bedtime, Disp: , Rfl:     diclofenac sodium (VOLTAREN) 1 % GEL, Apply 3 g topically 2 times daily, Disp: , Rfl:     acetaminophen (TYLENOL) 325 MG tablet, acetaminophen acetaminophen 325 mg tablet 2 tablet(s) Every 6 hours as needed for pain as needed for pain By Christo ([id number]), Disp: , Rfl:     ondansetron (ZOFRAN) 4 MG tablet, Take 4 mg by mouth every 8 hours as needed for Nausea or Vomiting, Disp: , Rfl:     memantine (NAMENDA) 10 MG tablet, Take 10 mg by mouth 2 times daily, Disp: , Rfl:     metoclopramide (REGLAN) 5 MG tablet, Take 5 mg by mouth 4 times daily, Disp: , Rfl:     omeprazole (PRILOSEC) 20 MG delayed release capsule, Take 20 mg by mouth daily , Disp: , Rfl:     rivastigmine (EXELON) 6 MG capsule, Take 6 mg by mouth 2 times daily, Disp: , Rfl:     rizatriptan (MAXALT) 10 MG tablet, Take 10 mg by mouth once as needed for Migraine May repeat in 2 hours if needed, Disp: , Rfl:    fluticasone (FLONASE) 50 MCG/ACT nasal spray, 1 spray by Nasal route daily, Disp: , Rfl:     Multiple Vitamins-Minerals (THERAPEUTIC MULTIVITAMIN-MINERALS) tablet, Take 1 tablet by mouth daily, Disp: , Rfl:     PHENobarbital (LUMINAL) 60 MG tablet, Take 60 mg by mouth 3 times daily. ., Disp: , Rfl:     cycloSPORINE (RESTASIS) 0.05 % ophthalmic emulsion, Place 1 drop into both eyes every 12 hours, Disp: , Rfl:     topiramate (TOPAMAX) 100 MG tablet, Take 100 mg by mouth 2 times daily, Disp: , Rfl:     cyanocobalamin 1000 MCG/ML injection, Inject 1,000 mcg into the muscle every 30 days, Disp: , Rfl:     vitamin D (ERGOCALCIFEROL) 09337 units CAPS capsule, Take 50,000 Units by mouth once a week, Disp: , Rfl:     vitamin E 400 UNIT capsule, Take 400 Units by mouth daily, Disp: , Rfl:     Cariprazine HCl (VRAYLAR) 3 MG CAPS, Take 4.5 mg by mouth nightly , Disp: , Rfl:   Allergies   Allergen Reactions    Asa [Aspirin]     Augmentin [Amoxicillin-Pot Clavulanate]     Pcn [Penicillins]        Past Medical History:   Diagnosis Date    Bipolar 1 disorder (HCC)     Depression     GERD (gastroesophageal reflux disease)     [facility]phaly ([facility])     Seizure disorder ([facility]      History reviewed. No pertinent surgical history. History reviewed. No pertinent family history.   Social History     Socioeconomic History    Marital status: Single     Spouse name: Not on file    Number of children: Not on file    Years of education: Not on file    Highest education level: Not on file   Occupational History    Not on file   Tobacco Use    Smoking status: Current Every Day Smoker     Packs/day: 0.25     Years: 43.00     Pack years: 10.75     Types: Cigars    Smokeless tobacco: Never Used   Vaping Use    Vaping Use: Never used   Substance and Sexual Activity    Alcohol use: No    Drug use: No    Sexual activity: Not on file   Other Topics Concern    Not on file   Social History Narrative    Not on file     Social Determinants of Health     Financial Resource Strain: Low Risk     Difficulty of Paying Living Expenses: Not hard at all   Food Insecurity: No Food Insecurity    Worried About Running Out of Food in the Last Year: Never true    [place] of Food in the Last Year: Never true   Transportation Needs:     Lack of Transportation (Medical): Not on file    Lack of Transportation (Non-Medical): Not on file   Physical Activity: Insufficiently Active    Days of Exercise per Week: 7 days    Minutes of Exercise per Session: 10 min   Stress:     Feeling of Stress : Not on file   Social Connections:     Frequency of Communication with Friends and Family: Not on file    Frequency of Social Gatherings with Friends and Family: Not on file    Attends [employer]ology Services: Not on file    Active Member of Clubs or Organizations: Not on file    Attends Club or Organization Meetings: Not on file    Marital Status: Not on file   Intimate Partner Violence:     Fear of Current or Ex-Partner: Not on file    Emotionally Abused: Not on file    Physically Abused: Not on file    Sexually Abused: Not on file   Housing Stability:     Unable to Pay for Housing in the Last Year: Not on file    Number of [place] in the Last Year: Not on file    Unstable Housing in the Last Year: Not on file       Vitals:    04/13/22 0951 04/13/22 1020   BP: 120/74 98/60   Site: Left Upper Arm Left Upper Arm   Position: Sitting Sitting   Cuff Size: Medium Adult Medium Adult   Pulse: 108    Temp: 97.7 °F (36.5 °C)    TempSrc: Temporal    SpO2: 95%    Weight: 203 lb (92.1 kg)    Height: 6' 1\" (1.854 m)        Physical Exam  Vitals and nursing note reviewed. Constitutional:       Appearance: Normal appearance. HENT:      Head: Normocephalic. Right Ear: Tympanic membrane and ear canal normal. There is no impacted cerumen. Left Ear: Tympanic membrane and ear canal normal. There is no impacted cerumen.       Nose: Nose normal. Mouth/Throat:      Mouth: Mucous membranes are dry. Eyes:      Extraocular Movements: Extraocular movements intact. Pupils: Pupils are equal, round, and reactive to light. Neck:      Vascular: No carotid bruit. Cardiovascular:      Rate and Rhythm: Normal rate and regular rhythm. Pulses: Normal pulses. Heart sounds: Normal heart sounds. No murmur heard. No friction rub. No gallop. Pulmonary:      Effort: Pulmonary effort is normal. No respiratory distress. Breath sounds: Normal breath sounds. No stridor. No wheezing, rhonchi or rales. Chest:      Chest wall: No tenderness. Abdominal:      General: Bowel sounds are normal. There is no distension. Palpations: Abdomen is soft. Musculoskeletal:         General: No swelling, tenderness, deformity or signs of injury. Cervical back: No rigidity. No muscular tenderness. Right lower leg: No edema. Left lower leg: No edema. Lymphadenopathy:      Cervical: No cervical adenopathy. Skin:     General: Skin is warm and dry. Capillary Refill: Capillary refill takes 2 to 3 seconds. Findings: No bruising, lesion or rash. Neurological:      General: No focal deficit present. Mental Status: He is alert and oriented to person, place, and time. Motor: No weakness. Gait: Gait normal.   Psychiatric:         Attention and Perception: Attention normal.         Mood and Affect: Mood normal.         Behavior: Behavior normal.         Thought Content: Thought content does not include homicidal or suicidal ideation. Thought content does not include homicidal or suicidal plan.                 Seen By:  RAKESH Figueroa - CNP

## 2022-04-13 NOTE — PATIENT INSTRUCTIONS
Personalized Preventive Plan for Alondra Johnson - 4/13/2022  Medicare offers a range of preventive health benefits. Some of the tests and screenings are paid in full while other may be subject to a deductible, co-insurance, and/or copay. Some of these benefits include a comprehensive review of your medical history including lifestyle, illnesses that may run in your family, and various assessments and screenings as appropriate. After reviewing your medical record and screening and assessments performed today your provider may have ordered immunizations, labs, imaging, and/or referrals for you. A list of these orders (if applicable) as well as your Preventive Care list are included within your After Visit Summary for your review. Other Preventive Recommendations:    · A preventive eye exam performed by an eye specialist is recommended every 1-2 years to screen for glaucoma; cataracts, macular degeneration, and other eye disorders. · A preventive dental visit is recommended every 6 months. · Try to get at least 150 minutes of exercise per week or 10,000 steps per day on a pedometer . · Order or download the FREE \"Exercise & Physical Activity: Your Everyday Guide\" from [employer]. Call 6-194.405.8376 or search [employer] online. · You need 2528-2918 mg of calcium and 0376-7859 IU of vitamin D per day. It is possible to meet your calcium requirement with diet alone, but a vitamin D supplement is usually necessary to meet this goal.  · When exposed to the sun, use a sunscreen that protects against both UVA and UVB radiation with an SPF of 30 or greater. Reapply every 2 to 3 hours or after sweating, drying off with a towel, or swimming. · Always wear a seat belt when traveling in a car. Always wear a helmet when riding a bicycle or motorcycle.

## 2022-04-13 NOTE — PROGRESS NOTES
Medicare Annual Wellness Visit    Turner Garcia is here for Medicare AWV    Assessment & Plan   Initial Medicare annual wellness visit  Seizure disorder ([facility] [address]  Weakness  Daytime somnolence  Hypotension due to drugs  Gastroesophageal reflux disease without esophagitis  Post-traumatic hydrocephalus (HCC)  Bipolar 1 disorder (HCC)  Mild episode of recurrent major depressive disorder ([facility] [address]      Recommendations for Preventive Services Due: see orders and patient instructions/AVS.  Recommended screening schedule for the next 5-10 years is provided to the patient in written form: see Patient Instructions/AVS.     Return for Medicare Annual Wellness Visit in 1 year. Subjective   The following acute and/or chronic problems were also addressed today:  SZ    Patient's complete Health Risk Assessment and screening values have been reviewed and are found in Flowsheets. The following problems were reviewed today and where indicated follow up appointments were made and/or referrals ordered.     Positive Risk Factor Screenings with Interventions:         Tobacco Use:     Tobacco Use: High Risk    Smoking Tobacco Use: Current Every Day Smoker    Smokeless Tobacco Use: Never Used     E-Cigarettes/Vaping Use     Questions Responses    E-Cigarette/Vaping Use Never User    Start Date     Passive Exposure     Quit Date     Counseling Given     Comments         Substance Abuse - Tobacco Interventions:  tobacco cessation tips and resources provided           General Health and ACP:  General  In general, how would you say your health is?: Good  In the past 7 days, have you experienced any of the following: New or Increased Pain, New or Increased Fatigue, Loneliness, Social Isolation, Stress or Anger?: No  Do you get the social and emotional support that you need?: Yes  Do you have a Living Will?: Yes    Advance Directives     Power of  Living Will ACP-Advance Directive ACP-Power of     Not on File Not on File Not on File Not on File      General Health Risk Interventions:  · No Living Will: Advance Care Planning addressed with patient today     Hearing/Vision:  Do you or your family notice any trouble with your hearing that hasn't been managed with hearing aids?: No  Do you have difficulty driving, watching TV, or doing any of your daily activities because of your eyesight?: (!) Yes  Have you had an eye exam within the past year?: Yes  No exam data present    Hearing/Vision Interventions:  · Hearing concerns:  patient declines any further evaluation/treatment for hearing issues     ADLs:  In the past 7 days, did you need help from others to perform any of the following everyday activities: Eating, dressing, grooming, bathing, toileting, or walking/balance?: No  In the past 7 days, did you need help from others to take care of any of the following: Laundry, housekeeping, banking/finances, shopping, telephone use, food preparation, transportation, or taking medications?: (!) Yes  Select all that apply: (!) Banking/Finances,Shopping,Telephone Use,Food Preparation,Transportation,Taking Medications    ADL Interventions:  · Patient declines any further evaluation/treatment for this issue          Objective   Vitals:    04/13/22 1002 04/13/22 1020   BP: 120/74 98/60   Site: Left Upper Arm Left Upper Arm   Position: Sitting Sitting   Cuff Size: Medium Adult Medium Adult   Pulse: 108    Temp: 97.7 °F (36.5 °C)    TempSrc: Temporal    SpO2: 95%    Weight: 203 lb (92.1 kg)    Height: 6' 1\" (1.854 m)       Body mass index is 26.78 kg/m².         General Appearance: alert and oriented to person, place and time, well developed and well- nourished, in no acute distress  Skin: warm and dry, no rash or erythema  Head: normocephalic and atraumatic  Eyes: pupils equal, round, and reactive to light, extraocular eye movements intact, conjunctivae normal  ENT: tympanic membrane, external ear and ear canal normal bilaterally, nose without deformity, nasal mucosa and turbinates normal without polyps  Neck: supple and non-tender without mass, no thyromegaly or thyroid nodules, no cervical lymphadenopathy  Pulmonary/Chest: clear to auscultation bilaterally- no wheezes, rales or rhonchi, normal air movement, no respiratory distress  Cardiovascular: normal rate, regular rhythm, normal S1 and S2, no murmurs, rubs, clicks, or gallops, distal pulses intact, no carotid bruits  Abdomen: soft, non-tender, non-distended, normal bowel sounds, no masses or organomegaly  Extremities: no cyanosis, clubbing or edema  Musculoskeletal: normal range of motion, no joint swelling, deformity or tenderness  Neurologic: reflexes normal and symmetric, no cranial nerve deficit, gait, coordination and speech normal       Allergies   Allergen Reactions    Asa [Aspirin]     Augmentin [Amoxicillin-Pot Clavulanate]     Pcn [Penicillins]      Prior to Visit Medications    Medication Sig Taking? Authorizing Provider   alum Hydroxide-Mag Carbonate 160-105 MG CHEW Take 1 tablet by mouth 2 times daily Takes 1 tab after breakfast and at bedtime Yes Historical Provider, MD   clonazePAM (KLONOPIN) 0.5 MG tablet Take 0.5 tablets by mouth 2 times daily for 30 days. Patient taking differently: Take 0.25 mg by mouth 2 times daily.  Takes 0.25 mg bid Yes RAKESH Jo - CNP   atorvastatin (LIPITOR) 40 MG tablet Take 40 mg by mouth daily  Yes Historical Provider, MD   levETIRAcetam (KEPPRA) 500 MG tablet Take 500 mg by mouth 2 times daily  Yes Historical Provider, MD   mirtazapine (REMERON) 30 MG tablet  Yes Historical Provider, MD   ammonium lactate (LAC-HYDRIN) 12 % lotion Apply topically as needed for Dry Skin Apply topically to both feet at bedtime Yes Historical Provider, MD   diclofenac sodium (VOLTAREN) 1 % GEL Apply 3 g topically 2 times daily Yes Historical Provider, MD   acetaminophen (TYLENOL) 325 MG tablet acetaminophen acetaminophen 325 mg tablet 2 tablet(s) Every 6 hours as needed for pain as needed for pain By Mouth Active   [facility] ([address]) Yes Historical Provider, MD   ondansetron (ZOFRAN) 4 MG tablet Take 4 mg by mouth every 8 hours as needed for Nausea or Vomiting Yes Historical Provider, MD   memantine (NAMENDA) 10 MG tablet Take 10 mg by mouth 2 times daily Yes Historical Provider, MD   metoclopramide (REGLAN) 5 MG tablet Take 5 mg by mouth 4 times daily Yes Historical Provider, MD   omeprazole (PRILOSEC) 20 MG delayed release capsule Take 20 mg by mouth daily  Yes Historical Provider, MD   rivastigmine (EXELON) 6 MG capsule Take 6 mg by mouth 2 times daily Yes Historical Provider, MD   rizatriptan (MAXALT) 10 MG tablet Take 10 mg by mouth once as needed for Migraine May repeat in 2 hours if needed Yes Historical Provider, MD   fluticasone (FLONASE) 50 MCG/ACT nasal spray 1 spray by Nasal route daily Yes Historical Provider, MD   Multiple Vitamins-Minerals (THERAPEUTIC MULTIVITAMIN-MINERALS) tablet Take 1 tablet by mouth daily Yes Historical Provider, MD   PHENobarbital (LUMINAL) 60 MG tablet Take 60 mg by mouth 3 times daily. . Yes Historical Provider, MD   cycloSPORINE (RESTASIS) 0.05 % ophthalmic emulsion Place 1 drop into both eyes every 12 hours Yes Historical Provider, MD   topiramate (TOPAMAX) 100 MG tablet Take 100 mg by mouth 2 times daily Yes Historical Provider, MD   cyanocobalamin 1000 MCG/ML injection Inject 1,000 mcg into the muscle every 30 days Yes Historical Provider, MD   vitamin D (ERGOCALCIFEROL) 99628 units CAPS capsule Take 50,000 Units by mouth once a week Yes Historical Provider, MD   vitamin E 400 UNIT capsule Take 400 Units by mouth daily Yes Historical Provider, MD   Cariprazine HCl (VRAYLAR) 3 MG CAPS Take 4.5 mg by mouth nightly  Yes Historical Provider, MD Manriquez (Including outside providers/suppliers regularly involved in providing care):   Patient Care Team:  RAKESH Schaefer - CNP as PCP - General (Family Medicine)  RAKESH Sequeira - CNP as PCP - [facility] Empaneled Provider    Reviewed and updated this visit:  Tobacco  Allergies  Meds  Med Hx  Surg Hx  Soc Hx  Fam Hx

## 2022-04-13 NOTE — PATIENT INSTRUCTIONS
Patient Education        Bipolar Disorder: Care Instructions  Your Care Instructions     Bipolar disorder is an illness that causes extreme mood changes, from times of very high energy (manic episodes) to times of depression. But many people with bipolar disorder show only the symptoms of depression. These moods may cause problems with your work, school, family life, friendships, and how well youfunction. This disease is also called manic-depression. There is no cure for bipolar disorder, but it can be helped with medicines. Counseling may also help. It is important to take your medicines exactly asprescribed, even when you feel well. You may need lifelong treatment. Follow-up care is a key part of your treatment and safety. Be sure to make and go to all appointments, and call your doctor if you are having problems. It's also a good idea to know your test results and keep alist of the medicines you take. How can you care for yourself at home?  Be safe with medicines. Take your medicines exactly as prescribed. Do not stop or change a medicine without talking to your doctor first. Hugo Josue and your doctor may need to try different combinations of medicines to find what works for you.  Take your medicines on schedule to keep your moods even. When you feel good, you may think that you do not need your medicines. But it is important to keep taking them.  Go to your counseling sessions. Call and talk with your counselor if you can't go to a session or if you don't think the sessions are helping. Do not just stop going.  Get at least 30 minutes of activity on most days of the week. Walking is a good choice. You also may want to do other things, such as running, swimming, or cycling.  Get enough sleep. Keep your room dark and quiet. Try to go to bed at the same time every night.  Eat a healthy diet. This includes whole grains, dairy, fruits, vegetables, and protein. Eat foods from each of these groups.    Try to lower your stress. Manage your time, build a strong support system, and lead a healthy lifestyle. To lower your stress, try physical activity, slow deep breathing, or getting a massage.  Do not use alcohol, marijuana, or illegal drugs.  Learn the early signs of your mood changes. You can then take steps to help yourself feel better.  Ask for help from friends and family when you need it. You may need help with daily chores when you are depressed. When you are manic, you may need support to control your high energy levels. What should you do if someone in your family has bipolar disorder?  Learn about the disease and signs it's getting worse.  Remind your family member you love them.  Make a plan with all family members about how to take care of your loved one when symptoms are bad.  Remind yourself it will take time for changes to occur.  Try not to blame yourself for the disease.  Know your legal rights and the legal rights of your family member. Support groups or counselors can help with this information.  Take care of yourself. Keep up with your interests, such as career, hobbies, and friends. Use exercise, positive self-talk, deep breathing, and other relaxing exercises to help lower your stress.  Give yourself time to grieve. You may need to deal with emotions such as anger, fear, and frustration.  If you are having a hard time with your feelings or with your relationship with your family member, talk with a counselor. When should you call for help? Call 911 anytime you think you may need emergency care. For example, call if:     You feel like hurting yourself or someone else.      Someone who has bipolar disorder displays dangerous behavior, and you think the person might hurt himself or herself or someone else. Call your doctor now or seek immediate medical care if:     You hear voices.      Someone you know has bipolar disorder and talks about suicide.  Keep the numbers for these national suicide hotlines: 2-379-403-TALK (0-683.127.6269) and 2-409-WRUITOZ (5-147.922.4133). If a suicide threat seems real, with a specific plan and a way to carry it out, stay with the person, or ask someone you trust to stay with the person, until you can get help.      Someone you know has bipolar disorder and:  ? Starts to give away possessions. ? Is using illegal drugs or drinking alcohol heavily. ? Talks or writes about death, including writing suicide notes or talking about guns, knives, or pills. ? Talks or writes about hurting someone else. ? Starts to spend a lot of time alone. ? Acts very aggressively or suddenly appears calm. ? Talks about beliefs that are not based in reality (delusions). Watch closely for changes in your health, and be sure to contact your doctor if:     You cannot go to your counseling sessions. Where can you learn more? Go to https://InsightsOne.APProtect. org and sign in to your [employer] account. Enter K052 in the Xobni box to learn more about \"Bipolar Disorder: Care Instructions. \"     If you do not have an account, please click on the \"Sign Up Now\" link. Current as of: June 16, 2021               Content Version: 13.2  © 9717-9036 [employer]. Care instructions adapted under license by [facility] ([facility]). If you have questions about a medical condition or this instruction, always ask your healthcare professional. [place] [address] any warranty or liability for your use of this information. Patient Education        Epilepsy: Care Instructions  Overview     Epilepsy is a common condition that causes repeated seizures. The seizures are caused by bursts of electrical activity in the brain that aren't normal. Seizures may cause problems with muscle control, movement, speech, vision, orawareness. They can be scary. Epilepsy affects each person differently. Some people have only a few seizures.  Others get them more often. If you know what triggers a seizure, you may beable to avoid having one. You can take medicines to control and reduce seizures. You and your doctor will need to find the right combination, schedule, and dose of medicine. This maytake time and careful changes. Follow-up care is a key part of your treatment and safety. Be sure to make and go to all appointments, and call your doctor if you are having problems. It's also a good idea to know your test results and keep alist of the medicines you take. How can you care for yourself at home? To help control your seizures, follow your treatment plan. If you take medicineto control seizures, take it exactly as prescribed. The medicine works best if you take the right amount on the schedule your doctor sets up. Following this schedule keeps the right level of medicine inyour body. Even missing just a few doses can allow seizures to happen. You might be on a special ketogenic diet. If so, follow the diet carefully. As you follow your treatment plan, also try to figure out and avoid things thatmay make you more likely to have a seizure. These may include:   Not getting enough sleep.  Using drugs or alcohol.  Being stressed.  Skipping meals. If you keep having seizures despite treatment, keep a record of them. Note the date, time of day, and any details about the seizure that you can remember. Your doctor can use this information to plan or adjust your medicine or other treatment. The record can also help your doctor find out what kinds of seizuresyou are having. If you have epilepsy:   Be sure that any doctor who treats you knows that you have epilepsy. And let the doctor know what medicines you take, if any.  Wear a medical ID bracelet. If you have a seizure or accident that leaves you unconscious or unable to speak for yourself, the bracelet will let those who are treating you know that you have epilepsy.  It will also list any medicines you take to control your seizures. That way, you won't be given any medicines that will react badly with those already in your body.  Ask your doctor if it's safe for you to do things like drive or swim.  Create a seizure first-aid plan with your friends and family. The plan will help them know how to help you. The kind of plan you need can depend on the kind of seizures you have. Your doctor can tell you more about this. When should you call for help? Call 911 anytime you think you may need emergency care. For example, call if:     A seizure does not stop as it normally does.      You have new symptoms such as:  ? Numbness, tingling, or weakness on one side of your body or face. ? Vision changes. ? Trouble speaking or thinking clearly. Call your doctor now or seek immediate medical care if:     You have a fever.      You have a severe headache. Watch closely for changes in your health, and be sure to contact your doctor if:     The normal pattern or features of your seizures change. Where can you learn more? Go to https://Avaamo."Entytle[employer] Inc.". org and sign in to your [employer] K[employer]K. account. Enter U344 in the myEnergyPlatform.com box to learn more about \"Epilepsy: Care Instructions. \"     If you do not have an account, please click on the \"Sign Up Now\" link. Current as of: December 13, 2021               Content Version: 13.2  © 8205-7560 Healthwise[employer] Incorporated. Care instructions adapted under license by [facility] ([facility]). If you have questions about a medical condition or this instruction, always ask your healthcare professional. [place] [address] any warranty or liability for your use of this information. Patient Education        Gastroesophageal Reflux Disease (GERD): Care Instructions  Overview     Gastroesophageal reflux disease (GERD) is the backward flow of stomach acid into the esophagus. The esophagus is the tube that leads from your throat to your stomach.  A one-way valve prevents the stomach acid from backing up into this tube. But when you have GERD, this valve does not close tightly enough. This can also cause pain and swelling in your esophagus. (This is calledesophagitis.)  If you have mild GERD symptoms including heartburn, you may be able to control the problem with antacids or over-the-counter medicine. You can also make lifestyle changes to help reduce your symptoms. These include changing yourdiet and eating habits, such as not eating late at night and losing weight. Follow-up care is a key part of your treatment and safety. Be sure to make and go to all appointments, and call your doctor if you are having problems. It's also a good idea to know your test results and keep alist of the medicines you take. How can you care for yourself at home?  Take your medicines exactly as prescribed. Call your doctor if you think you are having a problem with your medicine.  Your doctor may recommend over-the-counter medicine. For mild or occasional indigestion, antacids, such as Tums, Gaviscon, Mylanta, or Maalox, may help. Your doctor also may recommend over-the-counter acid reducers, such as famotidine (Pepcid AC), cimetidine (Tagamet HB), or omeprazole (Prilosec). Read and follow all instructions on the label. If you use these medicines often, talk with your doctor.  Change your eating habits. ? It's best to eat several small meals instead of two or three large meals. ? After you eat, wait 2 to 3 hours before you lie down. ? Avoid foods that make your symptoms worse. These may include chocolate, mint, alcohol, pepper, spicy foods, high-fat foods, or drinks with caffeine in them, such as tea, coffee, heidi, or energy drinks. If your symptoms are worse after you eat a certain food, you may want to stop eating it to see if your symptoms get better.  Do not smoke or chew tobacco. Smoking can make GERD worse.  If you need help quitting, talk to your doctor about stop-smoking programs and medicines. These can increase your chances of quitting for good.  If you have GERD symptoms at night, raise the head of your bed 6 to 8 inches by putting the frame on blocks or placing a foam wedge under the head of your mattress. (Adding extra pillows does not work.)   Do not wear tight clothing around your middle.  Lose weight if you need to. Losing just 5 to 10 pounds can help. When should you call for help? Call your doctor now or seek immediate medical care if:     You have new or different belly pain.      Your stools are black and tarlike or have streaks of blood. Watch closely for changes in your health, and be sure to contact your doctor if:     Your symptoms have not improved after 2 days.      Food seems to catch in your throat or chest.   Where can you learn more? Go to https://KodkodpeMitoProdeweb.[employer] [place]. org and sign in to your [employer] account. Enter X269 in the Incuron box to learn more about \"Gastroesophageal Reflux Disease (GERD): Care Instructions. \"     If you do not have an account, please click on the \"Sign Up Now\" link. Current as of: September 8, 2021               Content Version: 13.2  © 2006-2022 [employer]. Care instructions adapted under license by [facility] ([facility]). If you have questions about a medical condition or this instruction, always ask your healthcare professional. [place] [address] any warranty or liability for your use of this information. Patient Education        Low Blood Pressure: Care Instructions  Your Care Instructions     Blood pressure is a measurement of the force of the blood against the walls of the blood vessels during and after each beat of the heart. Low blood pressure is also called hypotension. It means that your blood pressure is much lower than normal. Some people, especially young, slim women, may have slightly low blood pressure without symptoms.  But in many people, low blood pressure can cause symptoms such as feeling dizzy or lightheaded. When your blood pressureis too low, your heart, brain, and other organs do not get enough blood. Low blood pressure can be caused by many things, including heart problems and some medicines. Diabetes that is not under control can cause your blood pressure to drop. And so can a severe allergic reaction or infection. Anothercause is dehydration, which is when your body loses too much fluid. Treatment for low blood pressure depends on the cause. Follow-up care is a key part of your treatment and safety. Be sure to make and go to all appointments, and call your doctor if you are having problems. It's also a good idea to know your test results and keep alist of the medicines you take. How can you care for yourself at home?  Be safe with medicines. Call your doctor if you think you are having a problem with your medicine. You will get more details on the specific medicines your doctor prescribes.  If you feel dizzy or lightheaded, sit down or lie down for a few minutes. Or you can sit down and put your head between your knees. This will help your blood pressure go back to normal and help your symptoms go away.  Follow your doctor's suggestions for ways to prevent symptoms like dizziness. These suggestions may include:  ? Get up slowly from bed or after sitting for a long time. If you are in bed, roll to your side and swing your legs over the edge of the bed and onto the floor. Push your body up to a sitting position. Wait for a while before you slowly stand up.  ? Add more salt to your diet, if your doctor recommends it. ? Drink plenty of fluids. Choose water and other clear liquids. If you have kidney, heart, or liver disease and have to limit fluids, talk with your doctor before you increase the amount of fluids you drink. ? Avoid or limit alcohol to 2 drinks a day for men and 1 drink a day for women.  Alcohol may interfere with your medicine. In addition, alcohol can make your low blood pressure worse by causing your body to lose water. ? Wear compression stockings to help improve blood flow. When should you call for help? Call 911 anytime you think you may need emergency care. For example, call if:     You passed out (lost consciousness). Call your doctor now or seek immediate medical care if:     You are dizzy or lightheaded, or you feel like you may faint. Watch closely for changes in your health, and be sure to contact your doctor ifyou have any problems. Where can you learn more? Go to https://Hookit.Survival Media. org and sign in to your Reading Room account. Enter C304 in the DCITS box to learn more about \"Low Blood Pressure: Care Instructions. \"     If you do not have an account, please click on the \"Sign Up Now\" link. Current as of: January 10, 2022               Content Version: 13.2  © 2006-2022 Healthwise, Incorporated. Care instructions adapted under license by [facility] ([facility]). If you have questions about a medical condition or this instruction, always ask your healthcare professional. [place] [address] any warranty or liability for your use of this information.

## 2022-05-06 ENCOUNTER — TELEPHONE (OUTPATIENT)
Dept: FAMILY MEDICINE CLINIC | Age: 62
End: 2022-05-06

## 2022-05-06 RX ORDER — TOPIRAMATE 50 MG/1
TABLET, FILM COATED ORAL
COMMUNITY
Start: 2022-04-27 | End: 2022-10-20

## 2022-06-15 DIAGNOSIS — G40.909 SEIZURE DISORDER (HCC): Primary | ICD-10-CM

## 2022-06-15 RX ORDER — PHENOBARBITAL 60 MG/1
60 TABLET ORAL 3 TIMES DAILY
Qty: 93 TABLET | Refills: 2 | Status: SHIPPED
Start: 2022-06-15 | End: 2022-09-08

## 2022-07-18 ENCOUNTER — TELEPHONE (OUTPATIENT)
Dept: FAMILY MEDICINE CLINIC | Age: 62
End: 2022-07-18

## 2022-07-18 NOTE — TELEPHONE ENCOUNTER
Medication Diclofenac gel was approved through prior auth on 7/11/22 checked .  Today 7/18/22 approved

## 2022-07-19 DIAGNOSIS — G40.909 SEIZURE DISORDER (HCC): ICD-10-CM

## 2022-07-19 RX ORDER — CLONAZEPAM 0.5 MG/1
0.25 TABLET ORAL 2 TIMES DAILY
Qty: 16 TABLET | Refills: 2 | Status: SHIPPED
Start: 2022-07-19 | End: 2022-10-11

## 2022-07-19 NOTE — TELEPHONE ENCOUNTER
Patients last appointment 4/13/2022.   Patients next scheduled appointment   Future Appointments   Date Time Provider Shani Espinal   10/20/2022  9:30 AM Marzetta Shrewsbury, APRN - CNP [facility]

## 2022-09-08 DIAGNOSIS — G40.909 SEIZURE DISORDER (HCC): ICD-10-CM

## 2022-09-08 RX ORDER — PHENOBARBITAL 60 MG/1
TABLET ORAL
Qty: 90 TABLET | Refills: 5 | Status: SHIPPED | OUTPATIENT
Start: 2022-09-08 | End: 2022-10-08

## 2022-10-07 DIAGNOSIS — E78.00 HYPERCHOLESTEROLEMIA: ICD-10-CM

## 2022-10-07 DIAGNOSIS — G40.909 SEIZURE DISORDER (HCC): ICD-10-CM

## 2022-10-07 DIAGNOSIS — Z79.899 ENCOUNTER FOR LONG-TERM CURRENT USE OF MEDICATION: ICD-10-CM

## 2022-10-07 DIAGNOSIS — F31.9 BIPOLAR 1 DISORDER (HCC): ICD-10-CM

## 2022-10-07 LAB
ALBUMIN SERPL-MCNC: 4.4 G/DL (ref 3.5–5.2)
ALBUMIN SERPL-MCNC: 4.5 G/DL (ref 3.5–5.2)
ALBUMIN SERPL-MCNC: 4.5 G/DL (ref 3.5–5.2)
ALP BLD-CCNC: 104 U/L (ref 40–129)
ALP BLD-CCNC: 106 U/L (ref 40–129)
ALP BLD-CCNC: 108 U/L (ref 40–129)
ALT SERPL-CCNC: 19 U/L (ref 0–40)
ALT SERPL-CCNC: 20 U/L (ref 0–40)
ALT SERPL-CCNC: 20 U/L (ref 0–40)
ANION GAP SERPL CALCULATED.3IONS-SCNC: 15 MMOL/L (ref 7–16)
ANION GAP SERPL CALCULATED.3IONS-SCNC: 16 MMOL/L (ref 7–16)
ANION GAP SERPL CALCULATED.3IONS-SCNC: 16 MMOL/L (ref 7–16)
AST SERPL-CCNC: 18 U/L (ref 0–39)
AST SERPL-CCNC: 18 U/L (ref 0–39)
AST SERPL-CCNC: 19 U/L (ref 0–39)
BASOPHILS ABSOLUTE: 0.02 E9/L (ref 0–0.2)
BASOPHILS ABSOLUTE: 0.03 E9/L (ref 0–0.2)
BASOPHILS ABSOLUTE: 0.03 E9/L (ref 0–0.2)
BASOPHILS RELATIVE PERCENT: 0.3 % (ref 0–2)
BASOPHILS RELATIVE PERCENT: 0.4 % (ref 0–2)
BASOPHILS RELATIVE PERCENT: 0.4 % (ref 0–2)
BILIRUB SERPL-MCNC: 0.2 MG/DL (ref 0–1.2)
BILIRUB SERPL-MCNC: <0.2 MG/DL (ref 0–1.2)
BILIRUB SERPL-MCNC: <0.2 MG/DL (ref 0–1.2)
BILIRUBIN URINE: NEGATIVE
BLOOD, URINE: NEGATIVE
BUN BLDV-MCNC: 10 MG/DL (ref 6–20)
BUN BLDV-MCNC: 10 MG/DL (ref 6–20)
BUN BLDV-MCNC: 11 MG/DL (ref 6–20)
CALCIUM SERPL-MCNC: 9.3 MG/DL (ref 8.6–10.2)
CALCIUM SERPL-MCNC: 9.4 MG/DL (ref 8.6–10.2)
CALCIUM SERPL-MCNC: 9.5 MG/DL (ref 8.6–10.2)
CHLORIDE BLD-SCNC: 104 MMOL/L (ref 98–107)
CHLORIDE BLD-SCNC: 106 MMOL/L (ref 98–107)
CHLORIDE BLD-SCNC: 106 MMOL/L (ref 98–107)
CHOLESTEROL, TOTAL: 166 MG/DL (ref 0–199)
CLARITY: CLEAR
CO2: 21 MMOL/L (ref 22–29)
CO2: 21 MMOL/L (ref 22–29)
CO2: 22 MMOL/L (ref 22–29)
COLOR: YELLOW
CREAT SERPL-MCNC: 0.9 MG/DL (ref 0.7–1.2)
EOSINOPHILS ABSOLUTE: 0.18 E9/L (ref 0.05–0.5)
EOSINOPHILS ABSOLUTE: 0.19 E9/L (ref 0.05–0.5)
EOSINOPHILS ABSOLUTE: 0.2 E9/L (ref 0.05–0.5)
EOSINOPHILS RELATIVE PERCENT: 2.5 % (ref 0–6)
EOSINOPHILS RELATIVE PERCENT: 2.7 % (ref 0–6)
EOSINOPHILS RELATIVE PERCENT: 2.7 % (ref 0–6)
FERRITIN: 91 NG/ML
GAMMA GLUTAMYL TRANSFERASE: 35 U/L (ref 10–71)
GFR AFRICAN AMERICAN: >60
GFR NON-AFRICAN AMERICAN: >60 ML/MIN/1.73
GLUCOSE BLD-MCNC: 111 MG/DL (ref 74–99)
GLUCOSE BLD-MCNC: 112 MG/DL (ref 74–99)
GLUCOSE FASTING: 113 MG/DL (ref 74–99)
GLUCOSE URINE: NEGATIVE MG/DL
HCT VFR BLD CALC: 45.2 % (ref 37–54)
HCT VFR BLD CALC: 45.2 % (ref 37–54)
HCT VFR BLD CALC: 45.8 % (ref 37–54)
HDLC SERPL-MCNC: 60 MG/DL
HEMOGLOBIN: 15.3 G/DL (ref 12.5–16.5)
HEMOGLOBIN: 15.3 G/DL (ref 12.5–16.5)
HEMOGLOBIN: 15.6 G/DL (ref 12.5–16.5)
IMMATURE GRANULOCYTES #: 0.01 E9/L
IMMATURE GRANULOCYTES #: 0.02 E9/L
IMMATURE GRANULOCYTES #: 0.02 E9/L
IMMATURE GRANULOCYTES %: 0.1 % (ref 0–5)
IMMATURE GRANULOCYTES %: 0.3 % (ref 0–5)
IMMATURE GRANULOCYTES %: 0.3 % (ref 0–5)
IRON SATURATION: 31 % (ref 20–55)
IRON: 80 MCG/DL (ref 59–158)
KETONES, URINE: NEGATIVE MG/DL
LDL CHOLESTEROL CALCULATED: 80 MG/DL (ref 0–99)
LEUKOCYTE ESTERASE, URINE: NEGATIVE
LYMPHOCYTES ABSOLUTE: 2.24 E9/L (ref 1.5–4)
LYMPHOCYTES ABSOLUTE: 2.3 E9/L (ref 1.5–4)
LYMPHOCYTES ABSOLUTE: 2.32 E9/L (ref 1.5–4)
LYMPHOCYTES RELATIVE PERCENT: 30.7 % (ref 20–42)
LYMPHOCYTES RELATIVE PERCENT: 31.8 % (ref 20–42)
LYMPHOCYTES RELATIVE PERCENT: 32.2 % (ref 20–42)
MCH RBC QN AUTO: 31.1 PG (ref 26–35)
MCH RBC QN AUTO: 31.2 PG (ref 26–35)
MCH RBC QN AUTO: 32 PG (ref 26–35)
MCHC RBC AUTO-ENTMCNC: 33.8 % (ref 32–34.5)
MCHC RBC AUTO-ENTMCNC: 33.8 % (ref 32–34.5)
MCHC RBC AUTO-ENTMCNC: 34.1 % (ref 32–34.5)
MCV RBC AUTO: 91.9 FL (ref 80–99.9)
MCV RBC AUTO: 92.2 FL (ref 80–99.9)
MCV RBC AUTO: 93.9 FL (ref 80–99.9)
MONOCYTES ABSOLUTE: 0.53 E9/L (ref 0.1–0.95)
MONOCYTES ABSOLUTE: 0.54 E9/L (ref 0.1–0.95)
MONOCYTES ABSOLUTE: 0.56 E9/L (ref 0.1–0.95)
MONOCYTES RELATIVE PERCENT: 7.3 % (ref 2–12)
MONOCYTES RELATIVE PERCENT: 7.6 % (ref 2–12)
MONOCYTES RELATIVE PERCENT: 7.7 % (ref 2–12)
NEUTROPHILS ABSOLUTE: 4.08 E9/L (ref 1.8–7.3)
NEUTROPHILS ABSOLUTE: 4.22 E9/L (ref 1.8–7.3)
NEUTROPHILS ABSOLUTE: 4.26 E9/L (ref 1.8–7.3)
NEUTROPHILS RELATIVE PERCENT: 57 % (ref 43–80)
NEUTROPHILS RELATIVE PERCENT: 57.7 % (ref 43–80)
NEUTROPHILS RELATIVE PERCENT: 58.3 % (ref 43–80)
NITRITE, URINE: NEGATIVE
PDW BLD-RTO: 12.1 FL (ref 11.5–15)
PDW BLD-RTO: 12.1 FL (ref 11.5–15)
PDW BLD-RTO: 12.2 FL (ref 11.5–15)
PH UA: 6 (ref 5–9)
PHENOBARBITAL LEVEL: 21.6 MCG/ML (ref 15–40)
PHENOBARBITAL LEVEL: 22.8 MCG/ML (ref 15–40)
PLATELET # BLD: 221 E9/L (ref 130–450)
PLATELET # BLD: 223 E9/L (ref 130–450)
PLATELET # BLD: 232 E9/L (ref 130–450)
PMV BLD AUTO: 12 FL (ref 7–12)
PMV BLD AUTO: 12 FL (ref 7–12)
PMV BLD AUTO: 12.2 FL (ref 7–12)
POTASSIUM SERPL-SCNC: 4.3 MMOL/L (ref 3.5–5)
POTASSIUM SERPL-SCNC: 4.4 MMOL/L (ref 3.5–5)
POTASSIUM SERPL-SCNC: 4.4 MMOL/L (ref 3.5–5)
PROTEIN UA: NEGATIVE MG/DL
RBC # BLD: 4.88 E12/L (ref 3.8–5.8)
RBC # BLD: 4.9 E12/L (ref 3.8–5.8)
RBC # BLD: 4.92 E12/L (ref 3.8–5.8)
SODIUM BLD-SCNC: 141 MMOL/L (ref 132–146)
SODIUM BLD-SCNC: 143 MMOL/L (ref 132–146)
SODIUM BLD-SCNC: 143 MMOL/L (ref 132–146)
SPECIFIC GRAVITY UA: 1.02 (ref 1–1.03)
TOTAL IRON BINDING CAPACITY: 262 MCG/DL (ref 250–450)
TOTAL PROTEIN: 7.1 G/DL (ref 6.4–8.3)
TOTAL PROTEIN: 7.2 G/DL (ref 6.4–8.3)
TOTAL PROTEIN: 7.4 G/DL (ref 6.4–8.3)
TRIGL SERPL-MCNC: 132 MG/DL (ref 0–149)
UROBILINOGEN, URINE: 0.2 E.U./DL
VLDLC SERPL CALC-MCNC: 26 MG/DL
WBC # BLD: 7.2 E9/L (ref 4.5–11.5)
WBC # BLD: 7.3 E9/L (ref 4.5–11.5)
WBC # BLD: 7.3 E9/L (ref 4.5–11.5)

## 2022-10-10 LAB
ALPHA-1 ANTITRYPSIN: 132 MG/DL (ref 90–200)
ANTI-MITOCHONDRIAL AB, IFA: NEGATIVE
ANTI-NUCLEAR ANTIBODY (ANA): NEGATIVE
EPSTEIN BARR VIRUS NUCLEAR AB IGG: 35.1 U/ML (ref 0–21.9)
EPSTEIN-BARR EARLY ANTIGEN ANTIBODY: 9.6 U/ML (ref 0–10.9)
EPSTEIN-BARR VCA IGG: 35.8 U/ML (ref 0–21.9)
EPSTEIN-BARR VCA IGM: <10 U/ML (ref 0–43.9)
HAV IGM SER IA-ACNC: NORMAL
HBV SURFACE AB TITR SER: NORMAL {TITER}
HEPATITIS C ANTIBODY INTERPRETATION: NORMAL
KEPPRA: 19 UG/ML (ref 10–40)
SMOOTH MUSCLE ANTIBODY: POSITIVE
TOPIRAMATE LEVEL: <1.5 UG/ML (ref 5–20)

## 2022-10-11 DIAGNOSIS — G40.909 SEIZURE DISORDER (HCC): ICD-10-CM

## 2022-10-11 LAB
ANTISMOOTH MUSCLE AB, QUANT: NORMAL
CERULOPLASMIN: 24 MG/DL (ref 15–30)
COPPER: 118.6 UG/DL (ref 70–140)
HEPATITIS B CORE TOTAL ANTIBODY: NONREACTIVE

## 2022-10-11 RX ORDER — CLONAZEPAM 0.5 MG/1
TABLET ORAL
Qty: 31 TABLET | Refills: 4 | Status: SHIPPED | OUTPATIENT
Start: 2022-10-11 | End: 2022-11-10

## 2022-10-11 NOTE — TELEPHONE ENCOUNTER
Patients last appointment 4/13/2022.   Patients next scheduled appointment   Future Appointments   Date Time Provider Shani Espinal   10/20/2022  9:30 AM RAKESH Chiang CNP [facility]

## 2022-10-20 ENCOUNTER — OFFICE VISIT (OUTPATIENT)
Dept: FAMILY MEDICINE CLINIC | Age: 62
End: 2022-10-20
Payer: MEDICARE

## 2022-10-20 VITALS
DIASTOLIC BLOOD PRESSURE: 60 MMHG | OXYGEN SATURATION: 97 % | WEIGHT: 211.4 LBS | TEMPERATURE: 98.7 F | BODY MASS INDEX: 28.02 KG/M2 | RESPIRATION RATE: 17 BRPM | HEART RATE: 77 BPM | HEIGHT: 73 IN | SYSTOLIC BLOOD PRESSURE: 106 MMHG

## 2022-10-20 DIAGNOSIS — G89.29 CHRONIC BILATERAL THORACIC BACK PAIN: ICD-10-CM

## 2022-10-20 DIAGNOSIS — R07.1 CHEST PAIN ON BREATHING: ICD-10-CM

## 2022-10-20 DIAGNOSIS — R07.81 RIB PAIN: ICD-10-CM

## 2022-10-20 DIAGNOSIS — M54.6 CHRONIC BILATERAL THORACIC BACK PAIN: ICD-10-CM

## 2022-10-20 DIAGNOSIS — R93.89 ABNORMAL CT OF THE CHEST: Primary | ICD-10-CM

## 2022-10-20 DIAGNOSIS — R53.1 WEAKNESS: ICD-10-CM

## 2022-10-20 DIAGNOSIS — F02.80 DEMENTIA ASSOCIATED WITH OTHER UNDERLYING DISEASE WITHOUT BEHAVIORAL DISTURBANCE (HCC): ICD-10-CM

## 2022-10-20 DIAGNOSIS — K21.9 GASTROESOPHAGEAL REFLUX DISEASE WITHOUT ESOPHAGITIS: ICD-10-CM

## 2022-10-20 DIAGNOSIS — R40.0 DAYTIME SOMNOLENCE: ICD-10-CM

## 2022-10-20 DIAGNOSIS — G91.3 POST-TRAUMATIC HYDROCEPHALUS (HCC): ICD-10-CM

## 2022-10-20 DIAGNOSIS — G40.909 SEIZURE DISORDER (HCC): ICD-10-CM

## 2022-10-20 DIAGNOSIS — Z00.00 INITIAL MEDICARE ANNUAL WELLNESS VISIT: ICD-10-CM

## 2022-10-20 PROCEDURE — G0008 ADMIN INFLUENZA VIRUS VAC: HCPCS | Performed by: NURSE PRACTITIONER

## 2022-10-20 PROCEDURE — 3017F COLORECTAL CA SCREEN DOC REV: CPT | Performed by: NURSE PRACTITIONER

## 2022-10-20 PROCEDURE — G8419 CALC BMI OUT NRM PARAM NOF/U: HCPCS | Performed by: NURSE PRACTITIONER

## 2022-10-20 PROCEDURE — 99214 OFFICE O/P EST MOD 30 MIN: CPT | Performed by: NURSE PRACTITIONER

## 2022-10-20 PROCEDURE — 90694 VACC AIIV4 NO PRSRV 0.5ML IM: CPT | Performed by: NURSE PRACTITIONER

## 2022-10-20 PROCEDURE — G8427 DOCREV CUR MEDS BY ELIG CLIN: HCPCS | Performed by: NURSE PRACTITIONER

## 2022-10-20 PROCEDURE — G8484 FLU IMMUNIZE NO ADMIN: HCPCS | Performed by: NURSE PRACTITIONER

## 2022-10-20 PROCEDURE — 4004F PT TOBACCO SCREEN RCVD TLK: CPT | Performed by: NURSE PRACTITIONER

## 2022-10-20 RX ORDER — CARIPRAZINE 4.5 MG/1
CAPSULE, GELATIN COATED ORAL
COMMUNITY
Start: 2022-09-24

## 2022-10-20 RX ORDER — DULOXETIN HYDROCHLORIDE 60 MG/1
60 CAPSULE, DELAYED RELEASE ORAL DAILY
COMMUNITY
Start: 2022-09-24

## 2022-10-20 ASSESSMENT — ENCOUNTER SYMPTOMS
CHEST TIGHTNESS: 0
VOICE CHANGE: 0
CONSTIPATION: 0
WHEEZING: 0
SHORTNESS OF BREATH: 0
COUGH: 0
DIARRHEA: 0
NAUSEA: 0
TROUBLE SWALLOWING: 0
BACK PAIN: 0
ABDOMINAL PAIN: 0
BLOOD IN STOOL: 0
VOMITING: 0

## 2022-10-20 NOTE — LETTER
[facility] [address]  Phone: [phone]  Fax: RAKESH Hernadez CNP        October 20, 2022     Patient: Patsy Russo   YOB: 1960   Date of Visit: 10/20/2022     [facility] Patient Encounter Form  CHIEF COMPLAINT  Discuss Labs (6 month follow up) and Flu Vaccine   FLU SHOT GIVEN HIGH DOSE  CT chest due to an abn CY in 2021     NEW AND DISCONTINUED MEDS THIS VISIT  New Prescriptions    No medications on file      Medications Discontinued During This Encounter   Medication Reason    cariprazine hcl (VRAYLAR) 3 MG CAPS capsule LIST CLEANUP    cycloSPORINE (RESTASIS) 0.05 % ophthalmic emulsion LIST CLEANUP    mirtazapine (REMERON) 30 MG tablet LIST CLEANUP    topiramate (TOPAMAX) 50 MG tablet LIST CLEANUP        Diet: No changes  Labs: Routine  Restrictions: None  Next Appt with PCP:  Visit date not found 1 month      RAKESH Leyva CNP

## 2022-10-20 NOTE — PROGRESS NOTES
[facility] : 1960 Sex: male  Age: 63 y.o. Chief Complaint   Patient presents with    Discuss Labs     6 month follow up    Flu Vaccine       Assessment and Plan:      Abnormal CT of the chest  -     CTA CHEST W CONTRAST; Future  Chest pain on breathing  -     CTA CHEST W CONTRAST; Future  Chronic bilateral thoracic back pain  -     CTA CHEST W CONTRAST; Future  Rib pain  -     CTA CHEST W CONTRAST; Future  Seizure disorder ([facility]  Initial Medicare annual wellness visit  Weakness  Daytime somnolence  Gastroesophageal reflux disease without esophagitis  Post-traumatic hydrocephalus (HCC)  Dementia associated with other underlying disease without behavioral disturbance (HCC)    USPTF:    (BGL 92) Abnormal Blood Glucose and Type 2 Diabetes Mellitus: Screening -- Adults aged 36 to 79 years who are overweight or obese Grade: B (Recommended)    (B/P 106/60) High Blood Pressure: Screening and Home Monitoring -- Adults  Grade: A (Recommended) recommends screening for high blood pressure in ages 25 years or older. obtain measurements outside of the clinical setting for diagnostic confirmation before starting treatment. Annual screening for adults aged 36 years or older or those who are at increased risk for blood pressure    (2020) Colorectal Cancer: Screening --Adults aged 48 to 76 years  Grade: A (Recommended) recommends screening for colorectal cancer starting at age 48 years and continuing until age 76 years. (, , LDL 80)  Lipid Disorders in Adults: Screening -- Men 28 and Older  Grade: A (Recommended) recommends screening men aged 28 and older for lipid disorders.      (Non Drinker) Alcohol Misuse: Screening and Behavioral Counseling Interventions in Primary Care -- Adults  Grade: B (Recommended) recommends that clinicians screen adults aged 25 years or older for alcohol misuse and provide persons engaged in risky or hazardous drinking with brief behavioral counseling interventions to reduce alcohol misuse. (BMI 27.98)  Obesity: Screening for and Management of-- All Adults  Grade: B(Recommended) recommends screening all adults for obesity. Clinicians should offer or refer patients with a body mass index (BMI) of 30 kg/m2 or higher to intensive, multicomponent behavioral interventions. (Not a fall risk)  Fall Prevention -- Exercise/Physical Therapy: Community-dwelling Adults 72 Years or Older, Increased Risk for Falls   Grade: B (Recommended) recommends exercise or physical therapy to prevent falls in community-dwelling adults aged 72 years or older who are at increased risk for falls. (No new symptoms noted or reported today)  Depression: Screening -- General adult population, including pregnant and postpartum women  Grade: B(Recommended) recommends screening for depression in the general adult population,  Screening should be implemented with adequate systems in place to ensure accurate diagnosis, effective treatment, and appropriate follow-up. (  ) Glaucoma: Screening - Adults and Diabetic Eye Exam     (TSH 3.64) Thyroid Dysfunction: Screening --      (  ) Prostate Cancer: Prostate-Specific Antigen (PSA)-Based Screening -- All Men  PSA 0.23 April 6 2022         Educational materials  printed for patient's review and were included in patient instructions on his After Visit Summary and given to patient at the end of visit. Counseled regarding above diagnosis, including possible risks and complications,  especially if left uncontrolled. Counseled regarding the possible side effects, risks, benefits and alternatives to treatment; patient and/or guardian verbalizes understanding, agrees, feels comfortable with and wishes to proceed with above treatment plan. Advised patient to call with any new medication issues, and read all Rx info from pharmacy to assure aware of all possible risks and side effects of medication before taking.      Reviewed age and gender appropriate health screening exams and vaccinations. Advised patient regarding importance of keeping up with recommended health maintenance and to schedule as soon as possible if overdue, as this is important in assessing for undiagnosed pathology, especially cancer, as well as protecting against potentially harmful/life threatening disease. Patient verbalizes understanding and agrees with above counseling, assessment and plan. All questions answered. Return in about 4 weeks (around 11/17/2022). Cait Chao is here today to go over EGD as well as lab work which was done because he is having episodes of dizziness he says he is having chest pain with rib pain and is concerned that there may be something ominous going on. Patient seems much more alert and interactive today some of his medications have been weaned down and you can tell that he is able to answer appropriately now without any problems. His history is somewhat lacking so a lot of his complaints were brought to me by the caretaker Jeana. LAST VISIT  Cait Chao  presents today for evaluation and management of chronic medical problems. Current medication list reviewed. The patient is tolerating all medications well without adverse events or known side effects. The patient does understand the risk and benefits of the prescribed medications. The patient is not up-to-date on all age-appropriate wellness issues. Patient denies any reccent hospitalizations or ER visit. No Acute Complaints reported: There are still some reports of daytime somnolence and hypotension without evidence of syncope there is hydration issues and Dr. Raj Cruz the neurologist wanted to wean him off of the Topamax half a pill a week at a time which has not been done and will start now to see if this helps with the daytime somnolence, dizziness and hypotension.   He wants to retire from the [employer] shop because of the difficulty in bending over and would like to go to a ODO    PRIOR  VISIT  This is a very pleasant 57-year-old white male who is a new patient to me who is on a multitude of medications for seizure bipolar disorder and has been having very low blood pressures and orthostatic nausea and vomiting at times with daytime somnolence. They deny any new injuries or fevers. No abdominal pain reported. It is reported to be that he is having some trouble sleeping at night but there is no staff in the house to confirm this. 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 Emily Manriquez presents today for evaluation and management of chronic medical problems. Current medication list reviewed. The patient is tolerating all medications but seems to be oversedated here at this visit there does not seem to be any reports of adverse events or known side effects. The patient does understand the risk and benefits of the prescribed medications. The patient is not up-to-date on all age-appropriate wellness issues. Patient denies any reccent hospitalizations or ER visit. No Acute Complaints reported:      CHRONIC CONDITION:      Hyperlipidemia: Mild in intensity but controlled on diet, without symptoms, no complications with dietary treatment regimen reporting no side effects or intolereances. Compliant with treatment and diet. No muscle aches, new joint pains or abd pain. Hypothyroid: Remains controlled, without symptoms, on * with no evidence of weight gain, palpitations, fatigue, difficulty swallowing, neck prominence or hair loss. Is better on medications and worsens with forgetting to take medications. Has been on medications religiously since last visit. Depression/Anxiety/SZ disorder/Migrains and Dementia: Stable depression with generalized anxiety.  Mild in intensity but well controlled on   levETIRAcetam (KEPPRA) 500 MG tablet, , Disp: , Rfl:   mirtazapine (REMERON) 30 MG tablet, , Disp: , Rfl:   ondansetron (ZOFRAN) 4 MG tablet, Take 4 mg by mouth every 8 hours as needed for Nausea or Vomiting, Disp: , Rfl:   memantine (NAMENDA) 10 MG tablet, Take 10 mg by mouth 2 times daily, Disp: , Rfl:   metoclopramide (REGLAN) 5 MG tablet, Take 5 mg by mouth 4 times daily, Disp: , Rfl:   omeprazole (PRILOSEC) 20 MG delayed release capsule, Take 40 mg by mouth daily, Disp: , Rfl:   rivastigmine (EXELON) 6 MG capsule, Take 6 mg by mouth 2 times daily, Disp: , Rfl:   rizatriptan (MAXALT) 10 MG tablet, Take 10 mg by mouth once as needed for Migraine May repeat in 2 hours if needed, Disp: , Rfl:   clonazePAM (KLONOPIN) 0.5 MG tablet, Take 0.5 mg by mouth 2 times daily. ., Disp: , Rfl:     fluticasone (FLONASE) 50 MCG/ACT nasal spray, 1 spray by Nasal route daily, Disp: , Rfl:   PHENobarbital (LUMINAL) 60 MG tablet, Take 60 mg by mouth 3 times daily. ., Disp: , Rfl:    topiramate (TOPAMAX) 100 MG tablet, Take 100 mg by mouth 2 times daily, Disp: , Rfl:    cyanocobalamin 1000 MCG/ML injection, Inject 1,000 mcg into the muscle every 30 days, Disp: , Rfl:    Cariprazine HCl (VRAYLAR) 3 MG CAPS, Take by mouth nightly, Disp: , Rfl: , without symptoms, no weight gain, no increase in anxiety, no suicidal or homicidal ideation's no unexplained fatigue, or relationship difficulties relayed this visit. Review of Systems   Constitutional:  Negative for activity change, chills, diaphoresis, fatigue, fever and unexpected weight change. HENT:  Negative for trouble swallowing and voice change. Eyes:  Negative for visual disturbance. Respiratory:  Negative for cough, chest tightness, shortness of breath and wheezing. Cardiovascular:  Positive for chest pain. Negative for palpitations and leg swelling. Gastrointestinal:  Negative for abdominal pain, blood in stool, constipation, diarrhea, nausea and vomiting. Endocrine: Negative for polydipsia, polyphagia and polyuria. Genitourinary:  Negative for dysuria, enuresis, frequency and hematuria. Musculoskeletal:  Positive for arthralgias.  Negative for back pain, gait problem, joint swelling, myalgias and neck stiffness. Skin:  Negative for rash. Neurological:  Negative for dizziness, seizures, syncope, facial asymmetry, weakness, light-headedness and numbness. Hematological:  Does not bruise/bleed easily. Psychiatric/Behavioral:  Negative for behavioral problems, confusion, decreased concentration, hallucinations and suicidal ideas. The patient is not nervous/anxious.         Current Outpatient Medications:     DULoxetine (CYMBALTA) 60 MG extended release capsule, , Disp: , Rfl:     VRAYLAR 4.5 MG CAPS capsule, , Disp: , Rfl:     clonazePAM (KLONOPIN) 0.5 MG tablet, 1/2 TAB (0.25MG) BY MOUTH TWIC E A DAY (Revegy D/O), Disp: 31 tablet, Rfl: 4    alum Hydroxide-Mag Carbonate 160-105 MG CHEW, Take 1 tablet by mouth 2 times daily Takes 1 tab after breakfast and at bedtime, Disp: , Rfl:     atorvastatin (LIPITOR) 40 MG tablet, Take 40 mg by mouth daily , Disp: , Rfl:     levETIRAcetam (KEPPRA) 500 MG tablet, Take 500 mg by mouth 2 times daily , Disp: , Rfl:     ammonium lactate (LAC-HYDRIN) 12 % lotion, Apply topically as needed for Dry Skin Apply topically to both feet at bedtime, Disp: , Rfl:     diclofenac sodium (VOLTAREN) 1 % GEL, Apply 3 g topically 2 times daily, Disp: , Rfl:     acetaminophen (TYLENOL) 325 MG tablet, acetaminophen acetaminophen 325 mg tablet 2 tablet(s) Every 6 hours as needed for pain as needed for pain By Mouth Active   [facility] ([id number]), Disp: , Rfl:     ondansetron (ZOFRAN) 4 MG tablet, Take 4 mg by mouth every 8 hours as needed for Nausea or Vomiting, Disp: , Rfl:     memantine (NAMENDA) 10 MG tablet, Take 10 mg by mouth 2 times daily, Disp: , Rfl:     metoclopramide (REGLAN) 5 MG tablet, Take 5 mg by mouth 4 times daily, Disp: , Rfl:     omeprazole (PRILOSEC) 20 MG delayed release capsule, Take 20 mg by mouth daily , Disp: , Rfl:     rivastigmine (EXELON) 6 MG capsule, Take 6 mg by mouth 2 times daily, Disp: , Rfl:     rizatriptan (MAXALT) 10 MG tablet, Take 10 mg by mouth once as needed for Migraine May repeat in 2 hours if needed, Disp: , Rfl:     fluticasone (FLONASE) 50 MCG/ACT nasal spray, 1 spray by Nasal route daily, Disp: , Rfl:     Multiple Vitamins-Minerals (THERAPEUTIC MULTIVITAMIN-MINERALS) tablet, Take 1 tablet by mouth daily, Disp: , Rfl:     cyanocobalamin 1000 MCG/ML injection, Inject 1,000 mcg into the muscle every 30 days, Disp: , Rfl:     vitamin D (ERGOCALCIFEROL) 83876 units CAPS capsule, Take 50,000 Units by mouth once a week, Disp: , Rfl:     vitamin E 400 UNIT capsule, Take 400 Units by mouth daily, Disp: , Rfl:   Allergies   Allergen Reactions    Asa [Aspirin]     Augmentin [Amoxicillin-Pot Clavulanate]     Pcn [Penicillins]        Past Medical History:   Diagnosis Date    [facility]     Depression     GERD (gastroesophageal reflux disease)     Hydrocephaly ([facility])     Seizure disorder ([facility]      History reviewed. No pertinent surgical history. History reviewed. No pertinent family history.   Social History     Socioeconomic History    Marital status: Single     Spouse name: Not on file    Number of children: Not on file    Years of education: Not on file    Highest education level: Not on file   Occupational History    Not on file   Tobacco Use    Smoking status: Every Day     Packs/day: 0.25     Years: 43.00     Pack years: 10.75     Types: Cigars, Cigarettes    Smokeless tobacco: Never   Vaping Use    Vaping Use: Never used   Substance and Sexual Activity    Alcohol use: No    Drug use: No    Sexual activity: Not on file   Other Topics Concern    Not on file   Social History Narrative    Not on file     Social Determinants of Health     Financial Resource Strain: Low Risk     Difficulty of Paying Living Expenses: Not hard at all   Food Insecurity: No Food Insecurity    Worried About [address] in the Last Year: Never true    [facility] in the Last Year: Never true Transportation Needs: Not on file   Physical Activity: Insufficiently Active    Days of Exercise per Week: 7 days    Minutes of Exercise per Session: 10 min   Stress: Not on file   Social Connections: Not on file   Intimate Partner Violence: Not on file   Housing Stability: Not on file       Vitals:    10/20/22 1033   BP: 106/60   Site: Left Upper Arm   Position: Sitting   Cuff Size: Large Adult   Pulse: 77   Resp: 17   Temp: 98.7 °F (37.1 °C)   TempSrc: Temporal   SpO2: 97%   Weight: 211 lb 6.4 oz (95.9 kg)   Height: 6' 1\" (1.854 m)       Physical Exam  Vitals and nursing note reviewed. Constitutional:       Appearance: Normal appearance. HENT:      Head: Normocephalic. Right Ear: Tympanic membrane and ear canal normal. There is no impacted cerumen. Left Ear: Tympanic membrane and ear canal normal. There is no impacted cerumen. Nose: Nose normal.      Mouth/Throat:      Mouth: Mucous membranes are dry. Eyes:      Extraocular Movements: Extraocular movements intact. Pupils: Pupils are equal, round, and reactive to light. Neck:      Vascular: No carotid bruit. Cardiovascular:      Rate and Rhythm: Normal rate and regular rhythm. Pulses: Normal pulses. Heart sounds: Normal heart sounds. No murmur heard. No friction rub. No gallop. Pulmonary:      Effort: Pulmonary effort is normal. No respiratory distress. Breath sounds: Normal breath sounds. No stridor. No wheezing, rhonchi or rales. Chest:      Chest wall: No tenderness. Abdominal:      General: Bowel sounds are normal. There is no distension. Palpations: Abdomen is soft. Musculoskeletal:         General: No swelling, tenderness, deformity or signs of injury. Cervical back: No rigidity. No muscular tenderness. Right lower leg: No edema. Left lower leg: No edema.       Comments: Reproducible back pain bilateral paraspinal mid thoracic along with rib pain bilaterally along the same region no bruising no crepitus   Lymphadenopathy:      Cervical: No cervical adenopathy. Skin:     General: Skin is warm and dry. Capillary Refill: Capillary refill takes 2 to 3 seconds. Findings: No bruising, lesion or rash. Neurological:      General: No focal deficit present. Mental Status: He is alert and oriented to person, place, and time. Motor: No weakness. Gait: Gait normal.   Psychiatric:         Attention and Perception: Attention normal.         Mood and Affect: Mood normal.         Behavior: Behavior normal.         Thought Content: Thought content does not include homicidal or suicidal ideation. Thought content does not include homicidal or suicidal plan.               Seen By:  RAKESH Dudley - CNP

## 2022-10-26 ENCOUNTER — TELEPHONE (OUTPATIENT)
Dept: FAMILY MEDICINE CLINIC | Age: 62
End: 2022-10-26

## 2022-10-26 NOTE — TELEPHONE ENCOUNTER
10/20/22 order placed for CTA Chest.  has tried 3 times to contact him to get this scheduled- unsuccessful. Called Jeana from [facility], had to leave message for her to call me back about this appt.

## 2022-11-02 ENCOUNTER — TELEPHONE (OUTPATIENT)
Dept: FAMILY MEDICINE CLINIC | Age: 62
End: 2022-11-02

## 2022-11-02 RX ORDER — PHENOBARBITAL 60 MG/1
60 TABLET ORAL 3 TIMES DAILY
COMMUNITY
Start: 2022-10-24

## 2022-11-02 RX ORDER — ACETAMINOPHEN 500 MG
500 TABLET ORAL 2 TIMES DAILY PRN
COMMUNITY

## 2022-11-02 NOTE — TELEPHONE ENCOUNTER
Called Jeana again, she has talked to [facility], Lottie Servin is scheduled for CTA Chest 12/1/22.

## 2022-12-01 ENCOUNTER — HOSPITAL ENCOUNTER (OUTPATIENT)
Dept: CT IMAGING | Age: 62
Discharge: HOME | End: 2022-12-03
Payer: MEDICARE

## 2022-12-01 DIAGNOSIS — R93.89 ABNORMAL CT OF THE CHEST: ICD-10-CM

## 2022-12-01 DIAGNOSIS — G89.29 CHRONIC BILATERAL THORACIC BACK PAIN: ICD-10-CM

## 2022-12-01 DIAGNOSIS — M54.6 CHRONIC BILATERAL THORACIC BACK PAIN: ICD-10-CM

## 2022-12-01 DIAGNOSIS — R07.1 CHEST PAIN ON BREATHING: ICD-10-CM

## 2022-12-01 DIAGNOSIS — R07.81 RIB PAIN: ICD-10-CM

## 2022-12-01 PROCEDURE — 71275 CT ANGIOGRAPHY CHEST: CPT

## 2022-12-01 PROCEDURE — 6360000004 HC RX CONTRAST MEDICATION: Performed by: RADIOLOGY

## 2022-12-01 RX ADMIN — IOPAMIDOL 75 ML: 755 INJECTION, SOLUTION INTRAVENOUS at 10:34

## 2023-03-03 DIAGNOSIS — G40.909 SEIZURE DISORDER (HCC): Primary | ICD-10-CM

## 2023-03-04 RX ORDER — PHENOBARBITAL 60 MG/1
TABLET ORAL
Qty: 93 TABLET | Refills: 4 | Status: SHIPPED | OUTPATIENT
Start: 2023-03-04 | End: 2023-04-04

## 2023-03-09 DIAGNOSIS — G40.909 SEIZURE DISORDER (HCC): ICD-10-CM

## 2023-03-09 RX ORDER — PHENOBARBITAL 60 MG/1
TABLET ORAL
Qty: 93 TABLET | Refills: 4 | Status: SHIPPED | OUTPATIENT
Start: 2023-03-09 | End: 2023-04-09

## 2023-04-03 DIAGNOSIS — G40.909 SEIZURE DISORDER (HCC): ICD-10-CM

## 2023-04-03 RX ORDER — CLONAZEPAM 0.5 MG/1
TABLET ORAL
Qty: 30 TABLET | Refills: 4 | Status: SHIPPED | OUTPATIENT
Start: 2023-04-03 | End: 2023-05-23

## 2023-04-07 ENCOUNTER — NURSE ONLY (OUTPATIENT)
Dept: FAMILY MEDICINE CLINIC | Age: 63
End: 2023-04-07
Payer: MEDICARE

## 2023-04-07 DIAGNOSIS — K21.9 GASTROESOPHAGEAL REFLUX DISEASE WITHOUT ESOPHAGITIS: ICD-10-CM

## 2023-04-07 DIAGNOSIS — E78.00 HYPERCHOLESTEROLEMIA: ICD-10-CM

## 2023-04-07 DIAGNOSIS — Z12.5 ENCOUNTER FOR PROSTATE CANCER SCREENING: ICD-10-CM

## 2023-04-07 DIAGNOSIS — G40.909 SEIZURE DISORDER (HCC): ICD-10-CM

## 2023-04-07 DIAGNOSIS — G91.9 HYDROCEPHALUS, UNSPECIFIED TYPE (HCC): ICD-10-CM

## 2023-04-07 DIAGNOSIS — G40.909 SEIZURE DISORDER (HCC): Primary | ICD-10-CM

## 2023-04-07 LAB
ALBUMIN SERPL-MCNC: 4.1 G/DL (ref 3.5–5.2)
ALP SERPL-CCNC: 90 U/L (ref 40–129)
ALT SERPL-CCNC: 21 U/L (ref 0–40)
ANION GAP SERPL CALCULATED.3IONS-SCNC: 15 MMOL/L (ref 7–16)
AST SERPL-CCNC: 20 U/L (ref 0–39)
BASOPHILS # BLD: 0.03 E9/L (ref 0–0.2)
BASOPHILS NFR BLD: 0.5 % (ref 0–2)
BILIRUB SERPL-MCNC: 0.2 MG/DL (ref 0–1.2)
BILIRUB UR QL STRIP: NEGATIVE
BUN SERPL-MCNC: 13 MG/DL (ref 6–23)
CALCIUM SERPL-MCNC: 9.2 MG/DL (ref 8.6–10.2)
CHLORIDE SERPL-SCNC: 101 MMOL/L (ref 98–107)
CHOLESTEROL, TOTAL: 156 MG/DL (ref 0–199)
CLARITY UR: CLEAR
CO2 SERPL-SCNC: 22 MMOL/L (ref 22–29)
COLOR UR: YELLOW
CREAT SERPL-MCNC: 0.9 MG/DL (ref 0.7–1.2)
EOSINOPHIL # BLD: 0.25 E9/L (ref 0.05–0.5)
EOSINOPHIL NFR BLD: 3.9 % (ref 0–6)
ERYTHROCYTE [DISTWIDTH] IN BLOOD BY AUTOMATED COUNT: 11.9 FL (ref 11.5–15)
GLUCOSE SERPL-MCNC: 125 MG/DL (ref 74–99)
GLUCOSE UR STRIP-MCNC: NEGATIVE MG/DL
HCT VFR BLD AUTO: 45.8 % (ref 37–54)
HDLC SERPL-MCNC: 63 MG/DL
HGB BLD-MCNC: 14.8 G/DL (ref 12.5–16.5)
HGB UR QL STRIP: NEGATIVE
IMM GRANULOCYTES # BLD: 0.01 E9/L
IMM GRANULOCYTES NFR BLD: 0.2 % (ref 0–5)
KETONES UR STRIP-MCNC: NEGATIVE MG/DL
LDLC SERPL CALC-MCNC: 64 MG/DL (ref 0–99)
LEUKOCYTE ESTERASE UR QL STRIP: NEGATIVE
LYMPHOCYTES # BLD: 2.37 E9/L (ref 1.5–4)
LYMPHOCYTES NFR BLD: 37.3 % (ref 20–42)
MCH RBC QN AUTO: 31.7 PG (ref 26–35)
MCHC RBC AUTO-ENTMCNC: 32.3 % (ref 32–34.5)
MCV RBC AUTO: 98.1 FL (ref 80–99.9)
MONOCYTES # BLD: 0.44 E9/L (ref 0.1–0.95)
MONOCYTES NFR BLD: 6.9 % (ref 2–12)
NEUTROPHILS # BLD: 3.26 E9/L (ref 1.8–7.3)
NEUTS SEG NFR BLD: 51.2 % (ref 43–80)
NITRITE UR QL STRIP: NEGATIVE
PH UR STRIP: 6 [PH] (ref 5–9)
PHENOBARB SERPL-MCNC: 21.1 MCG/ML (ref 15–40)
PLATELET # BLD AUTO: 216 E9/L (ref 130–450)
PMV BLD AUTO: 12 FL (ref 7–12)
POTASSIUM SERPL-SCNC: 4.1 MMOL/L (ref 3.5–5)
PROT SERPL-MCNC: 6.8 G/DL (ref 6.4–8.3)
PROT UR STRIP-MCNC: NEGATIVE MG/DL
PSA SERPL-MCNC: 0.2 NG/ML (ref 0–4)
RBC # BLD AUTO: 4.67 E12/L (ref 3.8–5.8)
SODIUM SERPL-SCNC: 138 MMOL/L (ref 132–146)
SP GR UR STRIP: 1.01 (ref 1–1.03)
T3FREE SERPL-MCNC: 3.1 PG/ML (ref 2–4.4)
T4 FREE SERPL-MCNC: 1.29 NG/DL (ref 0.93–1.7)
TRIGL SERPL-MCNC: 144 MG/DL (ref 0–149)
TSH SERPL-MCNC: 3.41 UIU/ML (ref 0.27–4.2)
UROBILINOGEN UR STRIP-ACNC: 0.2 E.U./DL
VLDLC SERPL CALC-MCNC: 29 MG/DL
WBC # BLD: 6.4 E9/L (ref 4.5–11.5)

## 2023-04-07 PROCEDURE — 36415 COLL VENOUS BLD VENIPUNCTURE: CPT | Performed by: NURSE PRACTITIONER

## 2023-04-07 PROCEDURE — 99999 PR OFFICE/OUTPT VISIT,PROCEDURE ONLY: CPT | Performed by: NURSE PRACTITIONER

## 2023-05-22 ENCOUNTER — TELEPHONE (OUTPATIENT)
Dept: FAMILY MEDICINE CLINIC | Age: 63
End: 2023-05-22

## 2023-05-22 RX ORDER — RIVASTIGMINE 13.3 MG/24H
1 PATCH, EXTENDED RELEASE TRANSDERMAL DAILY
COMMUNITY

## 2023-05-23 ENCOUNTER — OFFICE VISIT (OUTPATIENT)
Dept: FAMILY MEDICINE CLINIC | Age: 63
End: 2023-05-23
Payer: MEDICARE

## 2023-05-23 VITALS
BODY MASS INDEX: 28.09 KG/M2 | RESPIRATION RATE: 17 BRPM | HEART RATE: 86 BPM | SYSTOLIC BLOOD PRESSURE: 114 MMHG | WEIGHT: 212.9 LBS | TEMPERATURE: 98.5 F | DIASTOLIC BLOOD PRESSURE: 70 MMHG | OXYGEN SATURATION: 97 %

## 2023-05-23 VITALS
BODY MASS INDEX: 28.22 KG/M2 | HEIGHT: 73 IN | WEIGHT: 212.9 LBS | RESPIRATION RATE: 17 BRPM | OXYGEN SATURATION: 97 % | HEART RATE: 86 BPM | SYSTOLIC BLOOD PRESSURE: 114 MMHG | DIASTOLIC BLOOD PRESSURE: 70 MMHG | TEMPERATURE: 98.5 F

## 2023-05-23 DIAGNOSIS — M54.6 CHRONIC BILATERAL THORACIC BACK PAIN: Primary | ICD-10-CM

## 2023-05-23 DIAGNOSIS — G91.3 POST-TRAUMATIC HYDROCEPHALUS (HCC): ICD-10-CM

## 2023-05-23 DIAGNOSIS — G89.29 CHRONIC BILATERAL THORACIC BACK PAIN: Primary | ICD-10-CM

## 2023-05-23 DIAGNOSIS — K21.9 GASTROESOPHAGEAL REFLUX DISEASE WITHOUT ESOPHAGITIS: ICD-10-CM

## 2023-05-23 DIAGNOSIS — G43.719 INTRACTABLE CHRONIC MIGRAINE WITHOUT AURA AND WITHOUT STATUS MIGRAINOSUS: ICD-10-CM

## 2023-05-23 DIAGNOSIS — Z13.220 SCREENING FOR LIPOID DISORDERS: ICD-10-CM

## 2023-05-23 DIAGNOSIS — Z00.00 MEDICARE ANNUAL WELLNESS VISIT, SUBSEQUENT: Primary | ICD-10-CM

## 2023-05-23 DIAGNOSIS — Z13.29 SCREENING FOR THYROID DISORDER: ICD-10-CM

## 2023-05-23 DIAGNOSIS — F31.9 BIPOLAR 1 DISORDER (HCC): ICD-10-CM

## 2023-05-23 DIAGNOSIS — E78.00 HYPERCHOLESTEROLEMIA: ICD-10-CM

## 2023-05-23 DIAGNOSIS — F33.0 MILD EPISODE OF RECURRENT MAJOR DEPRESSIVE DISORDER (HCC): ICD-10-CM

## 2023-05-23 DIAGNOSIS — F02.80 DEMENTIA ASSOCIATED WITH OTHER UNDERLYING DISEASE WITHOUT BEHAVIORAL DISTURBANCE (HCC): ICD-10-CM

## 2023-05-23 DIAGNOSIS — G40.909 SEIZURE DISORDER (HCC): ICD-10-CM

## 2023-05-23 PROCEDURE — 3017F COLORECTAL CA SCREEN DOC REV: CPT | Performed by: NURSE PRACTITIONER

## 2023-05-23 PROCEDURE — G0439 PPPS, SUBSEQ VISIT: HCPCS | Performed by: NURSE PRACTITIONER

## 2023-05-23 PROCEDURE — G8427 DOCREV CUR MEDS BY ELIG CLIN: HCPCS | Performed by: NURSE PRACTITIONER

## 2023-05-23 PROCEDURE — 4004F PT TOBACCO SCREEN RCVD TLK: CPT | Performed by: NURSE PRACTITIONER

## 2023-05-23 PROCEDURE — 99213 OFFICE O/P EST LOW 20 MIN: CPT | Performed by: NURSE PRACTITIONER

## 2023-05-23 PROCEDURE — G8419 CALC BMI OUT NRM PARAM NOF/U: HCPCS | Performed by: NURSE PRACTITIONER

## 2023-05-23 ASSESSMENT — PATIENT HEALTH QUESTIONNAIRE - PHQ9
5. POOR APPETITE OR OVEREATING: 1
1. LITTLE INTEREST OR PLEASURE IN DOING THINGS: 0
8. MOVING OR SPEAKING SO SLOWLY THAT OTHER PEOPLE COULD HAVE NOTICED. OR THE OPPOSITE, BEING SO FIGETY OR RESTLESS THAT YOU HAVE BEEN MOVING AROUND A LOT MORE THAN USUAL: 0
SUM OF ALL RESPONSES TO PHQ9 QUESTIONS 1 & 2: 0
4. FEELING TIRED OR HAVING LITTLE ENERGY: 1
7. TROUBLE CONCENTRATING ON THINGS, SUCH AS READING THE NEWSPAPER OR WATCHING TELEVISION: 1
2. FEELING DOWN, DEPRESSED OR HOPELESS: 0
3. TROUBLE FALLING OR STAYING ASLEEP: 1
9. THOUGHTS THAT YOU WOULD BE BETTER OFF DEAD, OR OF HURTING YOURSELF: 0
10. IF YOU CHECKED OFF ANY PROBLEMS, HOW DIFFICULT HAVE THESE PROBLEMS MADE IT FOR YOU TO DO YOUR WORK, TAKE CARE OF THINGS AT HOME, OR GET ALONG WITH OTHER PEOPLE: 2
6. FEELING BAD ABOUT YOURSELF - OR THAT YOU ARE A FAILURE OR HAVE LET YOURSELF OR YOUR FAMILY DOWN: 0
SUM OF ALL RESPONSES TO PHQ QUESTIONS 1-9: 4

## 2023-05-23 ASSESSMENT — ENCOUNTER SYMPTOMS
DIARRHEA: 0
RHINORRHEA: 0
BLOOD IN STOOL: 0
STRIDOR: 0
EYE REDNESS: 0
FACIAL SWELLING: 0
APNEA: 0
SINUS PRESSURE: 0
COLOR CHANGE: 0
CHEST TIGHTNESS: 0
SHORTNESS OF BREATH: 0
COUGH: 0
ANAL BLEEDING: 0
ABDOMINAL PAIN: 0
EYE PAIN: 0
CHOKING: 0
CONSTIPATION: 0
NAUSEA: 0
BACK PAIN: 0
EYE ITCHING: 0
TROUBLE SWALLOWING: 0
VOMITING: 0
ABDOMINAL DISTENTION: 0
EYE DISCHARGE: 0
SORE THROAT: 0
SINUS PAIN: 0
PHOTOPHOBIA: 0
RECTAL PAIN: 0
VOICE CHANGE: 0
WHEEZING: 0

## 2023-05-23 ASSESSMENT — LIFESTYLE VARIABLES
HOW OFTEN DO YOU HAVE A DRINK CONTAINING ALCOHOL: NEVER
HOW MANY STANDARD DRINKS CONTAINING ALCOHOL DO YOU HAVE ON A TYPICAL DAY: PATIENT DOES NOT DRINK

## 2023-05-23 NOTE — PATIENT INSTRUCTIONS
list of the medicines you take. How can you care for yourself at home? Diet    Use less salt when you cook and eat. This helps lower your blood pressure. Taste food before salting. Add only a little salt when you think you need it. With time, your taste buds will adjust to less salt.     Eat fewer snack items, fast foods, canned soups, and other high-salt, high-fat, processed foods.     Read food labels and try to avoid saturated and trans fats. They increase your risk of heart disease by raising cholesterol levels.     Limit the amount of solid fat-butter, margarine, and shortening-you eat. Use olive, peanut, or canola oil when you cook. Bake, broil, and steam foods instead of frying them.     Eat a variety of fruit and vegetables every day. Dark green, deep orange, red, or yellow fruits and vegetables are especially good for you. Examples include spinach, carrots, peaches, and berries.     Foods high in fiber can reduce your cholesterol and provide important vitamins and minerals. High-fiber foods include whole-grain cereals and breads, oatmeal, beans, brown rice, citrus fruits, and apples.     Eat lean proteins. Heart-healthy proteins include seafood, lean meats and poultry, eggs, beans, peas, nuts, seeds, and soy products.     Limit drinks and foods with added sugar. These include candy, desserts, and soda pop. Lifestyle changes    If your doctor recommends it, get more exercise. Walking is a good choice. Bit by bit, increase the amount you walk every day. Try for at least 30 minutes on most days of the week. You also may want to swim, bike, or do other activities.     Do not smoke. If you need help quitting, talk to your doctor about stop-smoking programs and medicines. These can increase your chances of quitting for good. Quitting smoking may be the most important step you can take to protect your heart. It is never too late to quit.     Limit alcohol to 2 drinks a day for men and 1 drink a day for women.

## 2023-05-23 NOTE — PROGRESS NOTES
Gait: Gait normal.      Deep Tendon Reflexes: Reflexes normal.   Psychiatric:         Behavior: Behavior normal.      Comments: Somewhat flat affect, intellectually disabled       Assessment and Plan:  Melyssa Birch was seen today for follow-up and other. Diagnoses and all orders for this visit:    Chronic bilateral thoracic back pain    Seizure disorder (HCC)    Hypercholesterolemia  -     CBC with Auto Differential; Future  -     Comprehensive Metabolic Panel; Future  -     TSH; Future  -     T4, Free; Future  -     T3, Free; Future  -     Lipid Panel; Future  -     Urinalysis; Future    Screening for thyroid disorder  -     CBC with Auto Differential; Future  -     Comprehensive Metabolic Panel; Future  -     TSH; Future  -     T4, Free; Future  -     T3, Free; Future  -     Lipid Panel; Future  -     Urinalysis; Future    Screening for lipoid disorders  -     CBC with Auto Differential; Future  -     Comprehensive Metabolic Panel; Future  -     TSH; Future  -     T4, Free; Future  -     T3, Free; Future  -     Lipid Panel; Future  -     Urinalysis; Future    Post-traumatic hydrocephalus (HCC)    Mild episode of recurrent major depressive disorder (HCC)    Bipolar 1 disorder (HCC)    Gastroesophageal reflux disease without esophagitis    Dementia associated with other underlying disease without behavioral disturbance (HCC)    Intractable chronic migraine without aura and without status migrainosus        Discussions/Education provided to patients during visit:  [] Discussed the importance to stop smoking. [] Advised to monitor eating habits. [] Reviewed and discussed Imaging results. [] Reviewed and discussed Lab results. [] Discussed the importance of drinking plenty of fluids. [] Cut down on Salt and Caffeine.  [] Exercise 2-3 times weekly, if not more. [] Cut down on Sugar and Fats. [x] Continue Medications as Discussed. [x] Communicated with patient any concerns, to phone office.   [x] Follow up as

## 2023-05-23 NOTE — PROGRESS NOTES
No    Intervention:  has NO advanced directive - information provided        Tobacco Use:  Tobacco Use: High Risk    Smoking Tobacco Use: Every Day    Smokeless Tobacco Use: Never    Passive Exposure: Not on file     E-cigarette/Vaping       Questions Responses    E-cigarette/Vaping Use Never User    Start Date     Passive Exposure     Quit Date     Counseling Given     Comments           Interventions:  Patient declined any further intervention or treatment                        Objective   Vitals:    05/23/23 1008   BP: 114/70   Pulse: 86   Resp: 17   Temp: 98.5 °F (36.9 °C)   TempSrc: Temporal   SpO2: 97%   Weight: 212 lb 14.4 oz (96.6 kg)   Height: 6' 1\" (1.854 m)      Body mass index is 28.09 kg/m². Allergies   Allergen Reactions    Asa [Aspirin]     Augmentin [Amoxicillin-Pot Clavulanate]     Pcn [Penicillins]      Prior to Visit Medications    Medication Sig Taking? Authorizing Provider   rivastigmine (EXELON) 13.3 MG/24HR Place 1 patch onto the skin daily Yes Historical Provider, MD   mirtazapine (REMERON) 15 MG tablet  Yes Historical Provider, MD   memantine ER (NAMENDA XR) 28 MG CP24 extended release capsule  Yes Historical Provider, MD   PHENobarbital (LUMINAL) 60 MG tablet Take 1 tablet by mouth 3 times daily.  Yes Historical Provider, MD   alum Hydroxide-Mag Carbonate (ANTACID EXTRA STRENGTH) 160-105 MG CHEW Take by mouth 2 times daily Yes Historical Provider, MD   clonazePAM (KLONOPIN) 0.5 MG tablet 1/2 TAB (0.25MG) BY MOUTH TWIC E A DAY (BI-POLAR D/O) Yes Meera Park APRN - CNP   acetaminophen (TYLENOL) 500 MG tablet Take 1 tablet by mouth 2 times daily as needed for Pain Yes Historical Provider, MD   DULoxetine (CYMBALTA) 60 MG extended release capsule Take 1 capsule by mouth daily Yes Historical Provider, MD   atorvastatin (LIPITOR) 40 MG tablet Take 1 tablet by mouth daily Yes Historical Provider, MD   levETIRAcetam (KEPPRA) 500 MG tablet Take 1 tablet by mouth 2 times daily Yes

## 2023-08-16 ENCOUNTER — TELEPHONE (OUTPATIENT)
Dept: FAMILY MEDICINE CLINIC | Age: 63
End: 2023-08-16

## 2023-08-16 DIAGNOSIS — G91.9 HYDROCEPHALUS, UNSPECIFIED TYPE (HCC): ICD-10-CM

## 2023-08-16 DIAGNOSIS — F33.0 MILD EPISODE OF RECURRENT MAJOR DEPRESSIVE DISORDER (HCC): ICD-10-CM

## 2023-08-16 DIAGNOSIS — G40.909 SEIZURE DISORDER (HCC): ICD-10-CM

## 2023-08-16 DIAGNOSIS — K21.9 GASTROESOPHAGEAL REFLUX DISEASE WITHOUT ESOPHAGITIS: Primary | ICD-10-CM

## 2023-08-16 NOTE — TELEPHONE ENCOUNTER
----- Message from RAKESH Aguilar CNP sent at 8/16/2023  2:26 PM EDT -----  Regarding: RE: labs for Mel Luna Joby: [phone]  Order sent. Will have to sign waiver, may not be covered. ----- Message -----  From: Marilyn Grace RN  Sent: 8/16/2023   1:23 PM EDT  To: RAKESH Aguilar CNP  Subject: FW: labs for Judithcasper                             ----- Message -----  From: Lida Salazar  Sent: 8/16/2023   1:19 PM EDT  To: Neno FOSTER [address] [place] [address] Clinical Staff  Subject: labs for Novemmalgorzataer                               Per [facility] they would like us to add Vitamin D and B-12 labs.   can we do this for labs on 11/20 thank you Jeana [phone]

## 2023-09-13 DIAGNOSIS — G40.909 SEIZURE DISORDER (HCC): Primary | ICD-10-CM

## 2023-09-13 NOTE — TELEPHONE ENCOUNTER
Patients last appointment 5/23/2023. Patients next scheduled appointment   Future Appointments   Date Time Provider [address]  [address]   11/20/2023  8:10 AM SCHEDULE, ZHANG LEE EAbimbola [facility]   11/27/2023 10:30 AM RAKESH Smith CNP [facility]

## 2023-09-14 RX ORDER — PHENOBARBITAL 60 MG/1
60 TABLET ORAL 3 TIMES DAILY
Qty: 90 TABLET | Refills: 5 | Status: SHIPPED | OUTPATIENT
Start: 2023-09-14 | End: 2024-03-12

## 2023-10-02 DIAGNOSIS — G40.909 SEIZURE DISORDER (HCC): ICD-10-CM

## 2023-10-02 RX ORDER — CLONAZEPAM 0.5 MG/1
TABLET ORAL
Qty: 30 TABLET | Refills: 4 | Status: SHIPPED | OUTPATIENT
Start: 2023-10-02 | End: 2023-11-02

## 2023-10-02 NOTE — TELEPHONE ENCOUNTER
Patients last appointment 5/23/2023. Patients next scheduled appointment   Future Appointments   Date Time Provider 4600  46[facility]   11/20/2023  8:10 AM SCHEDULE, ZHANG LEE E. [facility]   11/27/2023 10:30 AM RAKESH Shepherd - LINH E. PAL PC [facility]

## 2023-11-17 DIAGNOSIS — G40.909 SEIZURE DISORDER (HCC): Primary | ICD-10-CM

## 2023-11-17 DIAGNOSIS — Z79.899 ENCOUNTER FOR LONG-TERM CURRENT USE OF MEDICATION: ICD-10-CM

## 2023-11-21 DIAGNOSIS — E78.00 HYPERCHOLESTEROLEMIA: ICD-10-CM

## 2023-11-21 DIAGNOSIS — Z13.220 SCREENING FOR LIPOID DISORDERS: ICD-10-CM

## 2023-11-21 DIAGNOSIS — G40.909 SEIZURE DISORDER (HCC): ICD-10-CM

## 2023-11-21 DIAGNOSIS — K21.9 GASTROESOPHAGEAL REFLUX DISEASE WITHOUT ESOPHAGITIS: ICD-10-CM

## 2023-11-21 DIAGNOSIS — G91.9 HYDROCEPHALUS, UNSPECIFIED TYPE (HCC): ICD-10-CM

## 2023-11-21 DIAGNOSIS — Z13.29 SCREENING FOR THYROID DISORDER: ICD-10-CM

## 2023-11-21 DIAGNOSIS — F33.0 MILD EPISODE OF RECURRENT MAJOR DEPRESSIVE DISORDER (HCC): ICD-10-CM

## 2023-11-21 DIAGNOSIS — Z79.899 ENCOUNTER FOR LONG-TERM CURRENT USE OF MEDICATION: ICD-10-CM

## 2023-11-21 LAB
ALBUMIN SERPL-MCNC: 4.5 G/DL (ref 3.5–5.2)
ALP BLD-CCNC: 103 U/L (ref 40–129)
ALT SERPL-CCNC: 21 U/L (ref 0–40)
ANION GAP SERPL CALCULATED.3IONS-SCNC: 18 MMOL/L (ref 7–16)
AST SERPL-CCNC: 26 U/L (ref 0–39)
BILIRUB SERPL-MCNC: 0.4 MG/DL (ref 0–1.2)
BUN BLDV-MCNC: 9 MG/DL (ref 6–23)
CALCIUM SERPL-MCNC: 9.6 MG/DL (ref 8.6–10.2)
CHLORIDE BLD-SCNC: 100 MMOL/L (ref 98–107)
CHOLESTEROL: 190 MG/DL
CO2: 22 MMOL/L (ref 22–29)
CREAT SERPL-MCNC: 1.1 MG/DL (ref 0.7–1.2)
GFR SERPL CREATININE-BSD FRML MDRD: >60 ML/MIN/1.73M2
GLUCOSE BLD-MCNC: 109 MG/DL (ref 74–99)
HDLC SERPL-MCNC: 64 MG/DL
LDL CHOLESTEROL: 94 MG/DL
PHENOBARBITAL DATE LAST DOSE: 0
PHENOBARBITAL DOSE AMOUNT: 0
PHENOBARBITAL TIME LAST DOSE: 0
PHENOBARBITAL: 22.5 UG/ML (ref 15–40)
POTASSIUM SERPL-SCNC: 4.2 MMOL/L (ref 3.5–5)
SODIUM BLD-SCNC: 140 MMOL/L (ref 132–146)
T3 FREE: 3.75 PG/ML (ref 2–4.4)
T4 FREE: 1.3 NG/DL (ref 0.9–1.7)
TOTAL PROTEIN: 7.2 G/DL (ref 6.4–8.3)
TRIGL SERPL-MCNC: 162 MG/DL
TSH SERPL DL<=0.05 MIU/L-ACNC: 3.85 UIU/ML (ref 0.27–4.2)
VITAMIN B-12: 643 PG/ML (ref 211–946)
VITAMIN D 25-HYDROXY: 51 NG/ML (ref 30–100)
VLDLC SERPL CALC-MCNC: 32 MG/DL

## 2023-11-23 LAB — KEPPRA: 7 UG/ML

## 2023-11-27 ENCOUNTER — OFFICE VISIT (OUTPATIENT)
Dept: FAMILY MEDICINE CLINIC | Age: 63
End: 2023-11-27
Payer: MEDICARE

## 2023-11-27 VITALS
RESPIRATION RATE: 18 BRPM | TEMPERATURE: 97.9 F | SYSTOLIC BLOOD PRESSURE: 100 MMHG | BODY MASS INDEX: 29.69 KG/M2 | OXYGEN SATURATION: 98 % | HEIGHT: 73 IN | DIASTOLIC BLOOD PRESSURE: 60 MMHG | HEART RATE: 68 BPM | WEIGHT: 224 LBS

## 2023-11-27 DIAGNOSIS — R11.10 VOMITING, UNSPECIFIED VOMITING TYPE, UNSPECIFIED WHETHER NAUSEA PRESENT: Primary | ICD-10-CM

## 2023-11-27 DIAGNOSIS — G91.9 HYDROCEPHALUS, UNSPECIFIED TYPE (HCC): ICD-10-CM

## 2023-11-27 DIAGNOSIS — F33.0 MILD EPISODE OF RECURRENT MAJOR DEPRESSIVE DISORDER (HCC): ICD-10-CM

## 2023-11-27 DIAGNOSIS — F31.9 BIPOLAR 1 DISORDER (HCC): ICD-10-CM

## 2023-11-27 DIAGNOSIS — G40.909 SEIZURE DISORDER (HCC): ICD-10-CM

## 2023-11-27 DIAGNOSIS — K21.9 GASTROESOPHAGEAL REFLUX DISEASE WITHOUT ESOPHAGITIS: ICD-10-CM

## 2023-11-27 PROCEDURE — 1036F TOBACCO NON-USER: CPT | Performed by: NURSE PRACTITIONER

## 2023-11-27 PROCEDURE — G8427 DOCREV CUR MEDS BY ELIG CLIN: HCPCS | Performed by: NURSE PRACTITIONER

## 2023-11-27 PROCEDURE — 3017F COLORECTAL CA SCREEN DOC REV: CPT | Performed by: NURSE PRACTITIONER

## 2023-11-27 PROCEDURE — 99214 OFFICE O/P EST MOD 30 MIN: CPT | Performed by: NURSE PRACTITIONER

## 2023-11-27 PROCEDURE — G8484 FLU IMMUNIZE NO ADMIN: HCPCS | Performed by: NURSE PRACTITIONER

## 2023-11-27 PROCEDURE — G8419 CALC BMI OUT NRM PARAM NOF/U: HCPCS | Performed by: NURSE PRACTITIONER

## 2023-11-27 RX ORDER — ACETAMINOPHEN 500 MG
500 TABLET ORAL 2 TIMES DAILY
Qty: 120 TABLET | Refills: 3 | Status: SHIPPED | OUTPATIENT
Start: 2023-11-27

## 2023-11-27 RX ORDER — OMEPRAZOLE 40 MG/1
CAPSULE, DELAYED RELEASE ORAL
COMMUNITY
Start: 2023-11-22

## 2023-11-27 RX ORDER — LOPERAMIDE HYDROCHLORIDE 2 MG/1
CAPSULE ORAL
COMMUNITY

## 2023-11-27 RX ORDER — LISINOPRIL 10 MG/1
TABLET ORAL
COMMUNITY

## 2023-11-27 RX ORDER — CARIPRAZINE 3 MG/1
CAPSULE, GELATIN COATED ORAL
COMMUNITY
Start: 2023-11-22

## 2023-11-27 RX ORDER — VITAMIN E 268 MG
CAPSULE ORAL
COMMUNITY
Start: 2023-11-22

## 2023-11-27 RX ORDER — CYCLOSPORINE 0.5 MG/ML
EMULSION OPHTHALMIC
COMMUNITY

## 2023-11-27 SDOH — ECONOMIC STABILITY: FOOD INSECURITY: WITHIN THE PAST 12 MONTHS, YOU WORRIED THAT YOUR FOOD WOULD RUN OUT BEFORE YOU GOT MONEY TO BUY MORE.: NEVER TRUE

## 2023-11-27 SDOH — ECONOMIC STABILITY: HOUSING INSECURITY
IN THE LAST 12 MONTHS, WAS THERE A TIME WHEN YOU DID NOT HAVE A STEADY PLACE TO SLEEP OR SLEPT IN A SHELTER (INCLUDING NOW)?: NO

## 2023-11-27 SDOH — ECONOMIC STABILITY: FOOD INSECURITY: WITHIN THE PAST 12 MONTHS, THE FOOD YOU BOUGHT JUST DIDN'T LAST AND YOU DIDN'T HAVE MONEY TO GET MORE.: NEVER TRUE

## 2023-11-27 SDOH — ECONOMIC STABILITY: INCOME INSECURITY: HOW HARD IS IT FOR YOU TO PAY FOR THE VERY BASICS LIKE FOOD, HOUSING, MEDICAL CARE, AND HEATING?: NOT HARD AT ALL

## 2023-11-27 ASSESSMENT — PATIENT HEALTH QUESTIONNAIRE - PHQ9
SUM OF ALL RESPONSES TO PHQ QUESTIONS 1-9: 0
3. TROUBLE FALLING OR STAYING ASLEEP: 0
6. FEELING BAD ABOUT YOURSELF - OR THAT YOU ARE A FAILURE OR HAVE LET YOURSELF OR YOUR FAMILY DOWN: 0
4. FEELING TIRED OR HAVING LITTLE ENERGY: 0
7. TROUBLE CONCENTRATING ON THINGS, SUCH AS READING THE NEWSPAPER OR WATCHING TELEVISION: 0
SUM OF ALL RESPONSES TO PHQ9 QUESTIONS 1 & 2: 0
SUM OF ALL RESPONSES TO PHQ QUESTIONS 1-9: 0
2. FEELING DOWN, DEPRESSED OR HOPELESS: 0
SUM OF ALL RESPONSES TO PHQ QUESTIONS 1-9: 0
SUM OF ALL RESPONSES TO PHQ QUESTIONS 1-9: 0
10. IF YOU CHECKED OFF ANY PROBLEMS, HOW DIFFICULT HAVE THESE PROBLEMS MADE IT FOR YOU TO DO YOUR WORK, TAKE CARE OF THINGS AT HOME, OR GET ALONG WITH OTHER PEOPLE: 0
1. LITTLE INTEREST OR PLEASURE IN DOING THINGS: 0
8. MOVING OR SPEAKING SO SLOWLY THAT OTHER PEOPLE COULD HAVE NOTICED. OR THE OPPOSITE, BEING SO FIGETY OR RESTLESS THAT YOU HAVE BEEN MOVING AROUND A LOT MORE THAN USUAL: 0
5. POOR APPETITE OR OVEREATING: 0
9. THOUGHTS THAT YOU WOULD BE BETTER OFF DEAD, OR OF HURTING YOURSELF: 0

## 2023-11-27 ASSESSMENT — COLUMBIA-SUICIDE SEVERITY RATING SCALE - C-SSRS
7. DID THIS OCCUR IN THE LAST THREE MONTHS: NO
4. HAVE YOU HAD THESE THOUGHTS AND HAD SOME INTENTION OF ACTING ON THEM?: NO
3. HAVE YOU BEEN THINKING ABOUT HOW YOU MIGHT KILL YOURSELF?: NO
5. HAVE YOU STARTED TO WORK OUT OR WORKED OUT THE DETAILS OF HOW TO KILL YOURSELF? DO YOU INTEND TO CARRY OUT THIS PLAN?: NO

## 2023-11-27 NOTE — PROGRESS NOTES
leg: No edema. Lymphadenopathy:      Cervical: No cervical adenopathy. Skin:     General: Skin is warm and dry. Capillary Refill: Capillary refill takes less than 2 seconds. Coloration: Skin is not jaundiced or pale. Findings: No bruising, erythema, lesion or rash. Neurological:      General: No focal deficit present. Mental Status: He is alert and oriented to person, place, and time. Mental status is at baseline. Cranial Nerves: No cranial nerve deficit. Sensory: No sensory deficit. Motor: No weakness. Coordination: Coordination normal.      Gait: Gait normal.      Deep Tendon Reflexes: Reflexes normal.   Psychiatric:         Mood and Affect: Mood normal.         Behavior: Behavior normal.      Comments: Intellectually challenged. Patient is at his baseline. Pleasant and cooperative with examination. Assessment and Plan:  Deniz Coleman was seen today for discuss labs and emesis. Diagnoses and all orders for this visit:    Vomiting, unspecified vomiting type, unspecified whether nausea present    Bipolar 1 disorder (HCC)    Seizure disorder (HCC)    Hydrocephalus, unspecified type ([address])    Gastroesophageal reflux disease without esophagitis    Mild episode of recurrent major depressive disorder ([address])    Other orders  -     acetaminophen (TYLENOL) 500 MG tablet; Take 1 tablet by mouth in the morning and 1 tablet in the evening. Discussions/Education provided to patients during visit:  [] Discussed the importance to stop smoking. [] Advised to monitor eating habits. [] Reviewed and discussed Imaging results. [] Reviewed and discussed Lab results. [] Discussed the importance of drinking plenty of fluids. [] Cut down on Salt and Caffeine.  [] Exercise 2-3 times weekly, if not more. [] Cut down on Sugar and Fats. [x] Continue Medications as Discussed. [x] Communicated with patient any concerns, to phone office. [x] Follow up as directed.        Return in

## 2023-11-28 ASSESSMENT — ENCOUNTER SYMPTOMS
SHORTNESS OF BREATH: 0
RECTAL PAIN: 0
CHEST TIGHTNESS: 0
SINUS PRESSURE: 0
EYE ITCHING: 0
BLOOD IN STOOL: 0
ANAL BLEEDING: 0
ABDOMINAL PAIN: 0
TROUBLE SWALLOWING: 0
NAUSEA: 0
PHOTOPHOBIA: 0
EYE PAIN: 0
CONSTIPATION: 0
EYE DISCHARGE: 0
WHEEZING: 0
EYE REDNESS: 0
VOICE CHANGE: 0
COLOR CHANGE: 0
SINUS PAIN: 0
FACIAL SWELLING: 0
SORE THROAT: 0
APNEA: 0
COUGH: 0
CHOKING: 0
STRIDOR: 0
RHINORRHEA: 0
ABDOMINAL DISTENTION: 0
BACK PAIN: 0
VOMITING: 1
DIARRHEA: 0

## 2023-12-04 LAB
BASOPHILS ABSOLUTE: NORMAL
BASOPHILS RELATIVE PERCENT: NORMAL
EOSINOPHILS ABSOLUTE: NORMAL
EOSINOPHILS RELATIVE PERCENT: NORMAL
HCT VFR BLD CALC: NORMAL %
HEMOGLOBIN: NORMAL
LYMPHOCYTES ABSOLUTE: NORMAL
LYMPHOCYTES RELATIVE PERCENT: NORMAL
MCH RBC QN AUTO: NORMAL PG
MCHC RBC AUTO-ENTMCNC: NORMAL G/DL
MCV RBC AUTO: NORMAL FL
MONOCYTES ABSOLUTE: NORMAL
MONOCYTES RELATIVE PERCENT: NORMAL
NEUTROPHILS ABSOLUTE: NORMAL
NEUTROPHILS RELATIVE PERCENT: NORMAL
PDW BLD-RTO: NORMAL %
PLATELET # BLD: NORMAL 10*3/UL
PMV BLD AUTO: NORMAL FL
RBC # BLD: NORMAL 10*6/UL
WBC # BLD: NORMAL 10*3/UL

## 2023-12-14 DIAGNOSIS — Z13.29 SCREENING FOR THYROID DISORDER: ICD-10-CM

## 2023-12-14 DIAGNOSIS — Z13.220 SCREENING FOR LIPOID DISORDERS: ICD-10-CM

## 2023-12-14 DIAGNOSIS — E78.00 HYPERCHOLESTEROLEMIA: ICD-10-CM

## 2024-01-30 ENCOUNTER — OFFICE VISIT (OUTPATIENT)
Dept: NEUROSURGERY | Age: 64
End: 2024-01-30
Payer: MEDICARE

## 2024-01-30 VITALS — HEIGHT: 72 IN | WEIGHT: 225 LBS | BODY MASS INDEX: 30.48 KG/M2

## 2024-01-30 DIAGNOSIS — G91.3 POST-TRAUMATIC HYDROCEPHALUS (HCC): ICD-10-CM

## 2024-01-30 DIAGNOSIS — G91.2 NORMAL PRESSURE HYDROCEPHALUS (HCC): Primary | ICD-10-CM

## 2024-01-30 PROCEDURE — G8427 DOCREV CUR MEDS BY ELIG CLIN: HCPCS | Performed by: PHYSICIAN ASSISTANT

## 2024-01-30 PROCEDURE — 99203 OFFICE O/P NEW LOW 30 MIN: CPT | Performed by: PHYSICIAN ASSISTANT

## 2024-01-30 PROCEDURE — 3017F COLORECTAL CA SCREEN DOC REV: CPT | Performed by: PHYSICIAN ASSISTANT

## 2024-01-30 PROCEDURE — G8484 FLU IMMUNIZE NO ADMIN: HCPCS | Performed by: PHYSICIAN ASSISTANT

## 2024-01-30 PROCEDURE — 99202 OFFICE O/P NEW SF 15 MIN: CPT

## 2024-01-30 PROCEDURE — 1036F TOBACCO NON-USER: CPT | Performed by: PHYSICIAN ASSISTANT

## 2024-01-30 PROCEDURE — G8417 CALC BMI ABV UP PARAM F/U: HCPCS | Performed by: PHYSICIAN ASSISTANT

## 2024-01-30 ASSESSMENT — ENCOUNTER SYMPTOMS
RESPIRATORY NEGATIVE: 1
EYES NEGATIVE: 1
GASTROINTESTINAL NEGATIVE: 1
ALLERGIC/IMMUNOLOGIC NEGATIVE: 1

## 2024-01-30 NOTE — PROGRESS NOTES
Subjective:      Patient ID: Chandra Rhoades is a 63 y.o. male.    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  He has been having a lot of vomiting which prompted an appt to have his  shunt evaluated.. Associated symptoms include headaches. (Chandra denies any recent worsening of his gait, bladder function, or memory.)       Review of Systems   Constitutional: Negative.    HENT: Negative.     Eyes: Negative.    Respiratory: Negative.     Cardiovascular: Negative.    Gastrointestinal: Negative.    Endocrine: Negative.    Genitourinary: Negative.    Skin: Negative.    Allergic/Immunologic: Negative.    Neurological:  Positive for headaches.   Hematological: Negative.    Psychiatric/Behavioral: Negative.         Objective:   Physical Exam  Constitutional:       Appearance: Normal appearance.   HENT:      Head: Normocephalic and atraumatic.      Nose: Nose normal.   Eyes:      Pupils: Pupils are equal, round, and reactive to light.   Pulmonary:      Effort: Pulmonary effort is normal.   Abdominal:      General: There is no distension.   Skin:     General: Skin is warm and dry.   Neurological:      Mental Status: He is alert.      GCS: GCS eye subscore is 4. GCS verbal subscore is 5. GCS motor subscore is 6.      Cranial Nerves: Cranial nerves 2-12 are intact.      Sensory: Sensation is intact.      Motor: Motor function is intact.      Gait: Gait is intact.   Psychiatric:         Mood and Affect: Mood normal.         Assessment:      63 year old male with recent history of nausea and vomiting.  He had a  shunt placed at 10 years old for a traumatic brain injury.  No recent worsening of memory, gait, or bladder function.  No imaging to review today.      Plan:      We will order a head CT and skull x-ray.  No current information of shunt , setting, or last revision.        AUBREY Acosta

## 2024-02-27 ENCOUNTER — OFFICE VISIT (OUTPATIENT)
Dept: FAMILY MEDICINE CLINIC | Age: 64
End: 2024-02-27
Payer: MEDICARE

## 2024-02-27 VITALS
HEIGHT: 73 IN | RESPIRATION RATE: 16 BRPM | SYSTOLIC BLOOD PRESSURE: 96 MMHG | TEMPERATURE: 99.3 F | BODY MASS INDEX: 28.02 KG/M2 | OXYGEN SATURATION: 95 % | WEIGHT: 211.4 LBS | DIASTOLIC BLOOD PRESSURE: 62 MMHG | HEART RATE: 87 BPM

## 2024-02-27 DIAGNOSIS — G40.909 SEIZURE DISORDER (HCC): Primary | ICD-10-CM

## 2024-02-27 DIAGNOSIS — F31.9 BIPOLAR 1 DISORDER (HCC): ICD-10-CM

## 2024-02-27 DIAGNOSIS — E78.00 HYPERCHOLESTEROLEMIA: ICD-10-CM

## 2024-02-27 DIAGNOSIS — K21.9 GASTROESOPHAGEAL REFLUX DISEASE WITHOUT ESOPHAGITIS: ICD-10-CM

## 2024-02-27 DIAGNOSIS — F02.80 DEMENTIA ASSOCIATED WITH OTHER UNDERLYING DISEASE WITHOUT BEHAVIORAL DISTURBANCE (HCC): ICD-10-CM

## 2024-02-27 PROBLEM — J96.01 ACUTE RESPIRATORY FAILURE WITH HYPOXIA (HCC): Status: RESOLVED | Noted: 2021-01-11 | Resolved: 2024-02-27

## 2024-02-27 PROCEDURE — 1036F TOBACCO NON-USER: CPT | Performed by: NURSE PRACTITIONER

## 2024-02-27 PROCEDURE — G8427 DOCREV CUR MEDS BY ELIG CLIN: HCPCS | Performed by: NURSE PRACTITIONER

## 2024-02-27 PROCEDURE — G8417 CALC BMI ABV UP PARAM F/U: HCPCS | Performed by: NURSE PRACTITIONER

## 2024-02-27 PROCEDURE — 3017F COLORECTAL CA SCREEN DOC REV: CPT | Performed by: NURSE PRACTITIONER

## 2024-02-27 PROCEDURE — 99214 OFFICE O/P EST MOD 30 MIN: CPT | Performed by: NURSE PRACTITIONER

## 2024-02-27 PROCEDURE — G8484 FLU IMMUNIZE NO ADMIN: HCPCS | Performed by: NURSE PRACTITIONER

## 2024-02-27 RX ORDER — ZOSTER VACCINE RECOMBINANT, ADJUVANTED 50 MCG/0.5
0.5 KIT INTRAMUSCULAR SEE ADMIN INSTRUCTIONS
Qty: 0.5 ML | Refills: 0 | Status: SHIPPED | OUTPATIENT
Start: 2024-02-27 | End: 2024-08-25

## 2024-02-27 RX ORDER — MISOPROSTOL 100 UG/1
TABLET ORAL
COMMUNITY
Start: 2024-02-26

## 2024-02-27 RX ORDER — LEVETIRACETAM 750 MG/1
750 TABLET ORAL 2 TIMES DAILY
COMMUNITY

## 2024-02-27 RX ORDER — HYDROCORTISONE 1 G/100G
CREAM TOPICAL
COMMUNITY
Start: 2024-01-11

## 2024-02-27 RX ORDER — MAGNESIUM HYDROXIDE 1200 MG/15ML
SUSPENSION ORAL
COMMUNITY
Start: 2024-01-11

## 2024-02-27 RX ORDER — THERMOMETER, ELECTRONIC,ORAL
EACH MISCELLANEOUS
COMMUNITY
Start: 2024-01-11

## 2024-02-27 RX ORDER — GUAIFENESIN 100 MG/5ML
LIQUID ORAL
COMMUNITY
Start: 2024-01-11

## 2024-02-27 RX ORDER — ASCORBIC ACID 500 MG
TABLET ORAL
COMMUNITY
Start: 2024-01-11

## 2024-02-27 RX ORDER — IBUPROFEN 200 MG
CAPSULE ORAL
COMMUNITY
Start: 2024-01-11

## 2024-02-27 RX ORDER — DIPHENHYDRAMINE HYDROCHLORIDE 25 MG/1
CAPSULE ORAL
COMMUNITY
Start: 2024-01-11

## 2024-02-27 RX ORDER — LIDOCAINE 50 MG/G
PATCH TOPICAL
COMMUNITY
Start: 2024-02-22

## 2024-02-27 ASSESSMENT — ENCOUNTER SYMPTOMS
DIARRHEA: 0
BACK PAIN: 0
EYE ITCHING: 0
CHOKING: 0
COLOR CHANGE: 0
SORE THROAT: 0
RECTAL PAIN: 0
SINUS PRESSURE: 0
TROUBLE SWALLOWING: 0
SHORTNESS OF BREATH: 0
ABDOMINAL DISTENTION: 0
WHEEZING: 0
VOICE CHANGE: 0
SINUS PAIN: 0
RHINORRHEA: 0
CONSTIPATION: 0
EYE DISCHARGE: 0
EYE REDNESS: 0
STRIDOR: 0
PHOTOPHOBIA: 0
CHEST TIGHTNESS: 0
COUGH: 0
ABDOMINAL PAIN: 0
EYE PAIN: 0
BLOOD IN STOOL: 0
ANAL BLEEDING: 0
NAUSEA: 0
APNEA: 0
FACIAL SWELLING: 0
VOMITING: 0

## 2024-02-27 ASSESSMENT — PATIENT HEALTH QUESTIONNAIRE - PHQ9
2. FEELING DOWN, DEPRESSED OR HOPELESS: 1
1. LITTLE INTEREST OR PLEASURE IN DOING THINGS: 1
SUM OF ALL RESPONSES TO PHQ9 QUESTIONS 1 & 2: 2
SUM OF ALL RESPONSES TO PHQ QUESTIONS 1-9: 10
9. THOUGHTS THAT YOU WOULD BE BETTER OFF DEAD, OR OF HURTING YOURSELF: 0
4. FEELING TIRED OR HAVING LITTLE ENERGY: 3
3. TROUBLE FALLING OR STAYING ASLEEP: 3
7. TROUBLE CONCENTRATING ON THINGS, SUCH AS READING THE NEWSPAPER OR WATCHING TELEVISION: 1
SUM OF ALL RESPONSES TO PHQ QUESTIONS 1-9: 10
10. IF YOU CHECKED OFF ANY PROBLEMS, HOW DIFFICULT HAVE THESE PROBLEMS MADE IT FOR YOU TO DO YOUR WORK, TAKE CARE OF THINGS AT HOME, OR GET ALONG WITH OTHER PEOPLE: 1
5. POOR APPETITE OR OVEREATING: 0
6. FEELING BAD ABOUT YOURSELF - OR THAT YOU ARE A FAILURE OR HAVE LET YOURSELF OR YOUR FAMILY DOWN: 0
SUM OF ALL RESPONSES TO PHQ QUESTIONS 1-9: 10
8. MOVING OR SPEAKING SO SLOWLY THAT OTHER PEOPLE COULD HAVE NOTICED. OR THE OPPOSITE, BEING SO FIGETY OR RESTLESS THAT YOU HAVE BEEN MOVING AROUND A LOT MORE THAN USUAL: 1
SUM OF ALL RESPONSES TO PHQ QUESTIONS 1-9: 10

## 2024-02-27 NOTE — PROGRESS NOTES
signs of injury. Normal range of motion.      Cervical back: Normal range of motion and neck supple. No rigidity. No muscular tenderness.      Right lower leg: No edema.      Left lower leg: No edema.   Lymphadenopathy:      Cervical: No cervical adenopathy.   Skin:     General: Skin is warm and dry.      Capillary Refill: Capillary refill takes less than 2 seconds.      Coloration: Skin is not jaundiced or pale.      Findings: No bruising, erythema, lesion or rash.   Neurological:      General: No focal deficit present.      Mental Status: He is alert and oriented to person, place, and time. Mental status is at baseline.      Cranial Nerves: No cranial nerve deficit.      Sensory: No sensory deficit.      Motor: No weakness.      Coordination: Coordination normal.      Gait: Gait normal.      Deep Tendon Reflexes: Reflexes normal.   Psychiatric:         Mood and Affect: Mood normal.         Behavior: Behavior normal.         Thought Content: Thought content normal.         Judgment: Judgment normal.      Comments: Mentally challenged at his baseline         Assessment and Plan:  Chandra was seen today for follow-up.    Diagnoses and all orders for this visit:    Seizure disorder (HCC)    Bipolar 1 disorder (HCC)    Gastroesophageal reflux disease without esophagitis    Hypercholesterolemia    Dementia associated with other underlying disease without behavioral disturbance (HCC)    Other orders  -     Tetanus-Diphth-Acell Pertussis (BOOSTRIX) 5-2.5-18.5 LF-MCG/0.5 injection; Inject 0.5 mLs into the muscle once for 1 dose  -     zoster recombinant adjuvanted vaccine (SHINGRIX) 50 MCG/0.5ML SUSR injection; Inject 0.5 mLs into the muscle See Admin Instructions 1 dose now and repeat in 2-6 months        Discussions/Education provided to patients during visit:  [] Discussed the importance to stop smoking.  [] Advised to monitor eating habits.  [] Reviewed and discussed Imaging results.  [] Reviewed and discussed Lab

## 2024-03-05 DIAGNOSIS — G40.909 SEIZURE DISORDER (HCC): ICD-10-CM

## 2024-03-05 RX ORDER — CLONAZEPAM 0.5 MG/1
TABLET ORAL
Qty: 30 TABLET | Refills: 4 | Status: SHIPPED | OUTPATIENT
Start: 2024-03-05 | End: 2024-07-31

## 2024-03-05 NOTE — TELEPHONE ENCOUNTER
Patients last appointment 2/27/2024.  Patients next scheduled appointment   Future Appointments   Date Time Provider Department Center   3/15/2024 10:00 AM SEB CT 1-BD [facility]   5/17/2024 11:00 AM Meera Park, APRN - CNP E. PAL [facility]

## 2024-03-15 ENCOUNTER — HOSPITAL ENCOUNTER (OUTPATIENT)
Age: 64
End: 2024-03-15
Payer: MEDICARE

## 2024-03-15 ENCOUNTER — HOSPITAL ENCOUNTER (OUTPATIENT)
Dept: CT IMAGING | Age: 64
End: 2024-03-15
Payer: MEDICARE

## 2024-03-15 ENCOUNTER — HOSPITAL ENCOUNTER (OUTPATIENT)
Dept: GENERAL RADIOLOGY | Age: 64
End: 2024-03-15
Payer: MEDICARE

## 2024-03-15 DIAGNOSIS — G91.3 POST-TRAUMATIC HYDROCEPHALUS (HCC): ICD-10-CM

## 2024-03-15 PROCEDURE — 70450 CT HEAD/BRAIN W/O DYE: CPT

## 2024-03-15 PROCEDURE — 70250 X-RAY EXAM OF SKULL: CPT

## 2024-05-03 PROBLEM — M15.9 PRIMARY OSTEOARTHRITIS INVOLVING MULTIPLE JOINTS: Status: ACTIVE | Noted: 2024-05-03

## 2024-05-03 PROBLEM — M15.0 PRIMARY OSTEOARTHRITIS INVOLVING MULTIPLE JOINTS: Status: ACTIVE | Noted: 2024-05-03

## 2024-05-13 DIAGNOSIS — G40.909 SEIZURE DISORDER (HCC): Primary | ICD-10-CM

## 2024-05-13 DIAGNOSIS — E78.00 HYPERCHOLESTEROLEMIA: ICD-10-CM

## 2024-05-13 DIAGNOSIS — Z12.5 SCREENING FOR PROSTATE CANCER: ICD-10-CM

## 2024-05-13 DIAGNOSIS — K21.9 GASTROESOPHAGEAL REFLUX DISEASE WITHOUT ESOPHAGITIS: ICD-10-CM

## 2024-05-13 DIAGNOSIS — E55.9 VITAMIN D DEFICIENCY: ICD-10-CM

## 2024-05-13 DIAGNOSIS — G91.9 HYDROCEPHALUS, UNSPECIFIED TYPE (HCC): ICD-10-CM

## 2024-05-13 DIAGNOSIS — G40.909 SEIZURE DISORDER (HCC): ICD-10-CM

## 2024-05-13 LAB
BASOPHILS ABSOLUTE: 0.02 K/UL (ref 0–0.2)
BASOPHILS RELATIVE PERCENT: 0 % (ref 0–2)
EOSINOPHILS ABSOLUTE: 0.31 K/UL (ref 0.05–0.5)
EOSINOPHILS RELATIVE PERCENT: 5 % (ref 0–6)
HCT VFR BLD CALC: 48.4 % (ref 37–54)
HEMOGLOBIN: 15.8 G/DL (ref 12.5–16.5)
IMMATURE GRANULOCYTES %: 0 % (ref 0–5)
IMMATURE GRANULOCYTES ABSOLUTE: 0.03 K/UL (ref 0–0.58)
LYMPHOCYTES ABSOLUTE: 2.05 K/UL (ref 1.5–4)
LYMPHOCYTES RELATIVE PERCENT: 30 % (ref 20–42)
MCH RBC QN AUTO: 32.1 PG (ref 26–35)
MCHC RBC AUTO-ENTMCNC: 32.6 G/DL (ref 32–34.5)
MCV RBC AUTO: 98.4 FL (ref 80–99.9)
MONOCYTES ABSOLUTE: 0.52 K/UL (ref 0.1–0.95)
MONOCYTES RELATIVE PERCENT: 8 % (ref 2–12)
NEUTROPHILS ABSOLUTE: 3.92 K/UL (ref 1.8–7.3)
NEUTROPHILS RELATIVE PERCENT: 57 % (ref 43–80)
PDW BLD-RTO: 11.9 % (ref 11.5–15)
PLATELET # BLD: 210 K/UL (ref 130–450)
PMV BLD AUTO: 11.9 FL (ref 7–12)
PROSTATE SPECIFIC ANTIGEN: 0.25 NG/ML (ref 0–4)
RBC # BLD: 4.92 M/UL (ref 3.8–5.8)
VITAMIN B-12: 711 PG/ML (ref 211–946)
VITAMIN D 25-HYDROXY: 54 NG/ML (ref 30–100)
WBC # BLD: 6.9 K/UL (ref 4.5–11.5)

## 2024-05-14 LAB
ALBUMIN: 4.4 G/DL (ref 3.5–5.2)
ALP BLD-CCNC: 84 U/L (ref 40–129)
ALT SERPL-CCNC: 36 U/L (ref 0–40)
ANION GAP SERPL CALCULATED.3IONS-SCNC: 21 MMOL/L (ref 7–16)
AST SERPL-CCNC: 35 U/L (ref 0–39)
BILIRUB SERPL-MCNC: 0.2 MG/DL (ref 0–1.2)
BUN BLDV-MCNC: 14 MG/DL (ref 6–23)
CALCIUM SERPL-MCNC: 9.9 MG/DL (ref 8.6–10.2)
CHLORIDE BLD-SCNC: 108 MMOL/L (ref 98–107)
CHOLESTEROL, TOTAL: 182 MG/DL
CO2: 15 MMOL/L (ref 22–29)
CREAT SERPL-MCNC: 1 MG/DL (ref 0.7–1.2)
GFR, ESTIMATED: 86 ML/MIN/1.73M2
GLUCOSE BLD-MCNC: 89 MG/DL (ref 74–99)
HDLC SERPL-MCNC: 61 MG/DL
LDL CHOLESTEROL: 85 MG/DL
PHENOBARBITAL DATE LAST DOSE: 0
PHENOBARBITAL DOSE AMOUNT: 0
PHENOBARBITAL TIME LAST DOSE: 0
PHENOBARBITAL: 22.8 UG/ML (ref 15–40)
POTASSIUM SERPL-SCNC: 5 MMOL/L (ref 3.5–5)
SODIUM BLD-SCNC: 144 MMOL/L (ref 132–146)
T3 FREE: 2.98 PG/ML (ref 2–4.4)
T4 FREE: 1 NG/DL (ref 0.9–1.7)
TOTAL PROTEIN: 7.4 G/DL (ref 6.4–8.3)
TRIGL SERPL-MCNC: 178 MG/DL
TSH SERPL DL<=0.05 MIU/L-ACNC: 3.14 UIU/ML (ref 0.27–4.2)
VLDLC SERPL CALC-MCNC: 36 MG/DL

## 2024-05-15 LAB — KEPPRA: 22 UG/ML

## 2024-05-21 SDOH — HEALTH STABILITY: PHYSICAL HEALTH: ON AVERAGE, HOW MANY DAYS PER WEEK DO YOU ENGAGE IN MODERATE TO STRENUOUS EXERCISE (LIKE A BRISK WALK)?: 0 DAYS

## 2024-05-21 ASSESSMENT — PATIENT HEALTH QUESTIONNAIRE - PHQ9
3. TROUBLE FALLING OR STAYING ASLEEP: NOT AT ALL
SUM OF ALL RESPONSES TO PHQ QUESTIONS 1-9: 1
6. FEELING BAD ABOUT YOURSELF - OR THAT YOU ARE A FAILURE OR HAVE LET YOURSELF OR YOUR FAMILY DOWN: NOT AT ALL
SUM OF ALL RESPONSES TO PHQ QUESTIONS 1-9: 1
4. FEELING TIRED OR HAVING LITTLE ENERGY: NOT AT ALL
SUM OF ALL RESPONSES TO PHQ9 QUESTIONS 1 & 2: 1
10. IF YOU CHECKED OFF ANY PROBLEMS, HOW DIFFICULT HAVE THESE PROBLEMS MADE IT FOR YOU TO DO YOUR WORK, TAKE CARE OF THINGS AT HOME, OR GET ALONG WITH OTHER PEOPLE: NOT DIFFICULT AT ALL
SUM OF ALL RESPONSES TO PHQ QUESTIONS 1-9: 1
7. TROUBLE CONCENTRATING ON THINGS, SUCH AS READING THE NEWSPAPER OR WATCHING TELEVISION: NOT AT ALL
2. FEELING DOWN, DEPRESSED OR HOPELESS: SEVERAL DAYS
1. LITTLE INTEREST OR PLEASURE IN DOING THINGS: NOT AT ALL
8. MOVING OR SPEAKING SO SLOWLY THAT OTHER PEOPLE COULD HAVE NOTICED. OR THE OPPOSITE, BEING SO FIGETY OR RESTLESS THAT YOU HAVE BEEN MOVING AROUND A LOT MORE THAN USUAL: NOT AT ALL
9. THOUGHTS THAT YOU WOULD BE BETTER OFF DEAD, OR OF HURTING YOURSELF: NOT AT ALL
5. POOR APPETITE OR OVEREATING: NOT AT ALL
SUM OF ALL RESPONSES TO PHQ QUESTIONS 1-9: 1

## 2024-05-21 ASSESSMENT — LIFESTYLE VARIABLES
HOW OFTEN DO YOU HAVE SIX OR MORE DRINKS ON ONE OCCASION: 1
HOW MANY STANDARD DRINKS CONTAINING ALCOHOL DO YOU HAVE ON A TYPICAL DAY: PATIENT DOES NOT DRINK
HOW OFTEN DO YOU HAVE A DRINK CONTAINING ALCOHOL: NEVER
HOW OFTEN DO YOU HAVE A DRINK CONTAINING ALCOHOL: 1
HOW MANY STANDARD DRINKS CONTAINING ALCOHOL DO YOU HAVE ON A TYPICAL DAY: 0

## 2024-05-24 ENCOUNTER — OFFICE VISIT (OUTPATIENT)
Dept: FAMILY MEDICINE CLINIC | Age: 64
End: 2024-05-24
Payer: MEDICARE

## 2024-05-24 VITALS
OXYGEN SATURATION: 97 % | TEMPERATURE: 98.5 F | SYSTOLIC BLOOD PRESSURE: 108 MMHG | HEIGHT: 73 IN | DIASTOLIC BLOOD PRESSURE: 74 MMHG | BODY MASS INDEX: 28.1 KG/M2 | WEIGHT: 212 LBS | HEART RATE: 70 BPM

## 2024-05-24 VITALS
BODY MASS INDEX: 28.1 KG/M2 | WEIGHT: 212 LBS | OXYGEN SATURATION: 97 % | TEMPERATURE: 98.5 F | DIASTOLIC BLOOD PRESSURE: 74 MMHG | HEIGHT: 73 IN | SYSTOLIC BLOOD PRESSURE: 108 MMHG | HEART RATE: 70 BPM

## 2024-05-24 DIAGNOSIS — E78.00 HYPERCHOLESTEROLEMIA: ICD-10-CM

## 2024-05-24 DIAGNOSIS — F31.9 BIPOLAR 1 DISORDER (HCC): ICD-10-CM

## 2024-05-24 DIAGNOSIS — K21.9 GASTROESOPHAGEAL REFLUX DISEASE WITHOUT ESOPHAGITIS: ICD-10-CM

## 2024-05-24 DIAGNOSIS — G91.9 HYDROCEPHALUS, UNSPECIFIED TYPE (HCC): ICD-10-CM

## 2024-05-24 DIAGNOSIS — R11.10 VOMITING, UNSPECIFIED VOMITING TYPE, UNSPECIFIED WHETHER NAUSEA PRESENT: ICD-10-CM

## 2024-05-24 DIAGNOSIS — Z00.00 MEDICARE ANNUAL WELLNESS VISIT, SUBSEQUENT: Primary | ICD-10-CM

## 2024-05-24 DIAGNOSIS — K57.92 DIVERTICULITIS: ICD-10-CM

## 2024-05-24 DIAGNOSIS — M54.6 CHRONIC BILATERAL THORACIC BACK PAIN: ICD-10-CM

## 2024-05-24 DIAGNOSIS — G89.29 CHRONIC BILATERAL THORACIC BACK PAIN: ICD-10-CM

## 2024-05-24 DIAGNOSIS — F02.80 DEMENTIA ASSOCIATED WITH OTHER UNDERLYING DISEASE WITHOUT BEHAVIORAL DISTURBANCE (HCC): ICD-10-CM

## 2024-05-24 DIAGNOSIS — G40.909 SEIZURE DISORDER (HCC): Primary | ICD-10-CM

## 2024-05-24 PROCEDURE — G8417 CALC BMI ABV UP PARAM F/U: HCPCS | Performed by: NURSE PRACTITIONER

## 2024-05-24 PROCEDURE — G8427 DOCREV CUR MEDS BY ELIG CLIN: HCPCS | Performed by: NURSE PRACTITIONER

## 2024-05-24 PROCEDURE — G0439 PPPS, SUBSEQ VISIT: HCPCS | Performed by: NURSE PRACTITIONER

## 2024-05-24 PROCEDURE — 3017F COLORECTAL CA SCREEN DOC REV: CPT | Performed by: NURSE PRACTITIONER

## 2024-05-24 PROCEDURE — 99214 OFFICE O/P EST MOD 30 MIN: CPT | Performed by: NURSE PRACTITIONER

## 2024-05-24 PROCEDURE — 1036F TOBACCO NON-USER: CPT | Performed by: NURSE PRACTITIONER

## 2024-05-24 SDOH — ECONOMIC STABILITY: FOOD INSECURITY: WITHIN THE PAST 12 MONTHS, THE FOOD YOU BOUGHT JUST DIDN'T LAST AND YOU DIDN'T HAVE MONEY TO GET MORE.: NEVER TRUE

## 2024-05-24 SDOH — ECONOMIC STABILITY: FOOD INSECURITY: WITHIN THE PAST 12 MONTHS, YOU WORRIED THAT YOUR FOOD WOULD RUN OUT BEFORE YOU GOT MONEY TO BUY MORE.: NEVER TRUE

## 2024-05-24 SDOH — ECONOMIC STABILITY: INCOME INSECURITY: HOW HARD IS IT FOR YOU TO PAY FOR THE VERY BASICS LIKE FOOD, HOUSING, MEDICAL CARE, AND HEATING?: NOT HARD AT ALL

## 2024-05-24 ASSESSMENT — ENCOUNTER SYMPTOMS
WHEEZING: 0
VOMITING: 1
SINUS PAIN: 0
BACK PAIN: 0
EYE DISCHARGE: 0
COUGH: 0
SINUS PRESSURE: 0
APNEA: 0
ABDOMINAL DISTENTION: 0
RECTAL PAIN: 0
CONSTIPATION: 0
EYE REDNESS: 0
SORE THROAT: 0
DIARRHEA: 0
VOICE CHANGE: 0
PHOTOPHOBIA: 0
COLOR CHANGE: 0
EYE ITCHING: 0
CHEST TIGHTNESS: 0
EYE PAIN: 0
NAUSEA: 1
CHOKING: 0
ABDOMINAL PAIN: 1
BLOOD IN STOOL: 0
TROUBLE SWALLOWING: 0
FACIAL SWELLING: 0
ANAL BLEEDING: 0
SHORTNESS OF BREATH: 0
STRIDOR: 0
RHINORRHEA: 0

## 2024-05-24 NOTE — PROGRESS NOTES
24  Chandra Rhoades : 1960 Sex: male  Age: 63 y.o.    Chief Complaint   Patient presents with    Discuss Labs       Patient here for check up and review of labs. He does have some occasional abdominal cramping with abdominal nausea and vomiting.        Review of Systems   Constitutional:  Negative for activity change, appetite change, chills, diaphoresis, fatigue, fever and unexpected weight change.   HENT:  Negative for congestion, dental problem, drooling, ear discharge, ear pain, facial swelling, hearing loss, mouth sores, nosebleeds, postnasal drip, rhinorrhea, sinus pressure, sinus pain, sneezing, sore throat, tinnitus, trouble swallowing and voice change.    Eyes:  Negative for photophobia, pain, discharge, redness, itching and visual disturbance.   Respiratory:  Negative for apnea, cough, choking, chest tightness, shortness of breath, wheezing and stridor.    Cardiovascular:  Negative for chest pain, palpitations and leg swelling.   Gastrointestinal:  Positive for abdominal pain, nausea and vomiting. Negative for abdominal distention, anal bleeding, blood in stool, constipation, diarrhea and rectal pain.   Endocrine: Negative for cold intolerance, heat intolerance, polydipsia, polyphagia and polyuria.   Genitourinary:  Negative for decreased urine volume, difficulty urinating, dysuria, enuresis, flank pain, frequency, genital sores, hematuria and urgency.   Musculoskeletal:  Negative for arthralgias, back pain, gait problem, joint swelling, myalgias, neck pain and neck stiffness.   Skin:  Negative for color change, pallor, rash and wound.   Allergic/Immunologic: Negative for environmental allergies, food allergies and immunocompromised state.   Neurological:  Negative for dizziness, tremors, seizures, syncope, facial asymmetry, speech difficulty, weakness, light-headedness, numbness and headaches.   Hematological:  Negative for adenopathy. Does not bruise/bleed easily.

## 2024-05-24 NOTE — PROGRESS NOTES
SpO2: 97%   Weight: 96.2 kg (212 lb)   Height: 1.842 m (6' 0.5\")      Body mass index is 28.36 kg/m².               Allergies   Allergen Reactions    Augmentin [Amoxicillin-Pot Clavulanate]     Aspirin      Other reaction(s): GI intolerance    Penicillins Rash     Prior to Visit Medications    Medication Sig Taking? Authorizing Provider   clonazePAM (KLONOPIN) 0.5 MG tablet 1/2 TAB (0.25MG) BY MOUTH TWIC E A DAY (BI-POLAR D/O) Yes Meera Park APRN - CNP   vitamin C (ASCORBIC ACID) 500 MG tablet  Yes ProviderFlavio MD   BANOPHEN 25 MG capsule  Yes ProviderFlavio MD   CHEST CONGESTION RELIEF 100 MG/5ML liquid  Yes ProviderFlavio MD   lidocaine (LIDODERM) 5 %  Yes ProviderFlavio MD   MILK OF MAGNESIA 400 MG/5ML suspension  Yes ProviderFlavio MD   miSOPROStol (CYTOTEC) 100 MCG tablet  Yes ProviderFlavio MD   neomycin-bacitracin-polymyxin (NEOSPORIN) 3.5-400-5000 OINT ointment  Yes ProviderFlavio MD   SORE THROAT SPRAY 1.4 % LIQD mouth spray  Yes Provider, MD Flavio   tolnaftate (TINACTIN) 1 % cream  Yes Provider, MD Flavio   levETIRAcetam (KEPPRA) 750 MG tablet Take 1 tablet by mouth 2 times daily Yes ProviderFlavio MD   zoster recombinant adjuvanted vaccine (SHINGRIX) 50 MCG/0.5ML SUSR injection Inject 0.5 mLs into the muscle See Admin Instructions 1 dose now and repeat in 2-6 months Yes Meera Park APRN - CNP   VRAYLAR 3 MG CAPS capsule  Yes ProviderFlavio MD   loperamide (IMODIUM) 2 MG capsule Oral for 30 Yes ProviderFlavio MD   omeprazole (PRILOSEC) 40 MG delayed release capsule  Yes ProviderFlavio MD   vitamin E 180 MG (400 UNIT) CAPS capsule  Yes ProviderFlavio MD   acetaminophen (TYLENOL) 500 MG tablet Take 1 tablet by mouth in the morning and 1 tablet in the evening. Yes Meera Park APRN - CNP   rivastigmine (EXELON) 13.3 MG/24HR Place 1 patch onto the skin daily Yes ProviderFlavio MD   memantine ER

## 2024-07-02 DIAGNOSIS — G40.909 SEIZURE DISORDER (HCC): ICD-10-CM

## 2024-07-02 RX ORDER — CLONAZEPAM 0.5 MG/1
TABLET ORAL
Qty: 31 TABLET | Refills: 3 | Status: SHIPPED | OUTPATIENT
Start: 2024-07-02 | End: 2024-11-04

## 2024-07-02 NOTE — TELEPHONE ENCOUNTER
Patients last appointment 5/24/2024.  Patients next scheduled appointment   Future Appointments   Date Time Provider Department Center   11/25/2024  9:00 AM SCHEDULE, ZHANG LEE E. PAL PC [facility]   12/2/2024  9:30 AM Meera Park APRN - CNP [facility]   5/28/2025  9:00 AM Meera Park APRN - CNP E. [facility]

## 2024-09-20 ENCOUNTER — TELEPHONE (OUTPATIENT)
Dept: PHYSICAL THERAPY | Age: 64
End: 2024-09-20

## 2024-11-06 ENCOUNTER — TELEPHONE (OUTPATIENT)
Dept: FAMILY MEDICINE CLINIC | Age: 64
End: 2024-11-06

## 2024-11-06 RX ORDER — PHENOBARBITAL 60 MG/1
60 TABLET ORAL 3 TIMES DAILY
COMMUNITY
Start: 2024-08-22

## 2024-11-22 DIAGNOSIS — F33.0 MILD EPISODE OF RECURRENT MAJOR DEPRESSIVE DISORDER (HCC): ICD-10-CM

## 2024-11-22 DIAGNOSIS — G40.909 SEIZURE DISORDER (HCC): Primary | ICD-10-CM

## 2024-11-22 DIAGNOSIS — F31.9 BIPOLAR 1 DISORDER (HCC): ICD-10-CM

## 2024-11-22 DIAGNOSIS — E78.00 HYPERCHOLESTEROLEMIA: ICD-10-CM

## 2024-11-22 DIAGNOSIS — G91.9 HYDROCEPHALUS, UNSPECIFIED TYPE (HCC): ICD-10-CM

## 2024-11-22 DIAGNOSIS — E55.9 VITAMIN D DEFICIENCY: ICD-10-CM

## 2024-11-22 DIAGNOSIS — F02.80 DEMENTIA ASSOCIATED WITH OTHER UNDERLYING DISEASE WITHOUT BEHAVIORAL DISTURBANCE (HCC): ICD-10-CM

## 2024-11-25 ENCOUNTER — OFFICE VISIT (OUTPATIENT)
Age: 64
End: 2024-11-25
Payer: MEDICARE

## 2024-11-25 ENCOUNTER — LAB (OUTPATIENT)
Dept: FAMILY MEDICINE CLINIC | Age: 64
End: 2024-11-25
Payer: MEDICARE

## 2024-11-25 VITALS
RESPIRATION RATE: 16 BRPM | OXYGEN SATURATION: 97 % | HEART RATE: 76 BPM | WEIGHT: 209 LBS | SYSTOLIC BLOOD PRESSURE: 110 MMHG | HEIGHT: 73 IN | DIASTOLIC BLOOD PRESSURE: 72 MMHG | BODY MASS INDEX: 27.7 KG/M2 | TEMPERATURE: 97.1 F

## 2024-11-25 DIAGNOSIS — F31.9 BIPOLAR 1 DISORDER (HCC): ICD-10-CM

## 2024-11-25 DIAGNOSIS — E55.9 VITAMIN D DEFICIENCY: ICD-10-CM

## 2024-11-25 DIAGNOSIS — G40.909 SEIZURE DISORDER (HCC): ICD-10-CM

## 2024-11-25 DIAGNOSIS — F33.0 MILD EPISODE OF RECURRENT MAJOR DEPRESSIVE DISORDER (HCC): ICD-10-CM

## 2024-11-25 DIAGNOSIS — G91.9 HYDROCEPHALUS, UNSPECIFIED TYPE (HCC): ICD-10-CM

## 2024-11-25 DIAGNOSIS — E78.00 HYPERCHOLESTEROLEMIA: ICD-10-CM

## 2024-11-25 DIAGNOSIS — M79.672 LEFT FOOT PAIN: Primary | ICD-10-CM

## 2024-11-25 DIAGNOSIS — F02.80 DEMENTIA ASSOCIATED WITH OTHER UNDERLYING DISEASE WITHOUT BEHAVIORAL DISTURBANCE (HCC): ICD-10-CM

## 2024-11-25 LAB
BASOPHILS ABSOLUTE: 0.03 K/UL (ref 0–0.2)
BASOPHILS RELATIVE PERCENT: 1 % (ref 0–2)
EOSINOPHILS ABSOLUTE: 0.37 K/UL (ref 0.05–0.5)
EOSINOPHILS RELATIVE PERCENT: 6 % (ref 0–6)
HCT VFR BLD CALC: 46.7 % (ref 37–54)
HEMOGLOBIN: 14.9 G/DL (ref 12.5–16.5)
IMMATURE GRANULOCYTES %: 0 % (ref 0–5)
IMMATURE GRANULOCYTES ABSOLUTE: <0.03 K/UL (ref 0–0.58)
LYMPHOCYTES ABSOLUTE: 2.02 K/UL (ref 1.5–4)
LYMPHOCYTES RELATIVE PERCENT: 31 % (ref 20–42)
MCH RBC QN AUTO: 31.6 PG (ref 26–35)
MCHC RBC AUTO-ENTMCNC: 31.9 G/DL (ref 32–34.5)
MCV RBC AUTO: 98.9 FL (ref 80–99.9)
MONOCYTES ABSOLUTE: 0.57 K/UL (ref 0.1–0.95)
MONOCYTES RELATIVE PERCENT: 9 % (ref 2–12)
NEUTROPHILS ABSOLUTE: 3.46 K/UL (ref 1.8–7.3)
NEUTROPHILS RELATIVE PERCENT: 54 % (ref 43–80)
PDW BLD-RTO: 12.6 % (ref 11.5–15)
PLATELET # BLD: 202 K/UL (ref 130–450)
PMV BLD AUTO: 12.9 FL (ref 7–12)
RBC # BLD: 4.72 M/UL (ref 3.8–5.8)
WBC # BLD: 6.5 K/UL (ref 4.5–11.5)

## 2024-11-25 PROCEDURE — 36415 COLL VENOUS BLD VENIPUNCTURE: CPT | Performed by: NURSE PRACTITIONER

## 2024-11-25 PROCEDURE — G8427 DOCREV CUR MEDS BY ELIG CLIN: HCPCS | Performed by: NURSE PRACTITIONER

## 2024-11-25 PROCEDURE — 1036F TOBACCO NON-USER: CPT | Performed by: NURSE PRACTITIONER

## 2024-11-25 PROCEDURE — G8417 CALC BMI ABV UP PARAM F/U: HCPCS | Performed by: NURSE PRACTITIONER

## 2024-11-25 PROCEDURE — 99213 OFFICE O/P EST LOW 20 MIN: CPT | Performed by: NURSE PRACTITIONER

## 2024-11-25 PROCEDURE — 3017F COLORECTAL CA SCREEN DOC REV: CPT | Performed by: NURSE PRACTITIONER

## 2024-11-25 PROCEDURE — G8484 FLU IMMUNIZE NO ADMIN: HCPCS | Performed by: NURSE PRACTITIONER

## 2024-11-25 ASSESSMENT — ENCOUNTER SYMPTOMS
EYE ITCHING: 0
COLOR CHANGE: 0
VOMITING: 0
BACK PAIN: 0
EYE REDNESS: 0
CHOKING: 0
RHINORRHEA: 0
TROUBLE SWALLOWING: 0
ABDOMINAL PAIN: 0
CONSTIPATION: 0
VOICE CHANGE: 0
ANAL BLEEDING: 0
FACIAL SWELLING: 0
NAUSEA: 0
COUGH: 0
SINUS PAIN: 0
SINUS PRESSURE: 0
DIARRHEA: 0
SHORTNESS OF BREATH: 0
BLOOD IN STOOL: 0
RECTAL PAIN: 0
EYE PAIN: 0
EYE DISCHARGE: 0
ABDOMINAL DISTENTION: 0
APNEA: 0
PHOTOPHOBIA: 0
WHEEZING: 0
STRIDOR: 0
SORE THROAT: 0
CHEST TIGHTNESS: 0

## 2024-11-25 NOTE — PROGRESS NOTES
24  Chandra Rhoades : 1960 Sex: male  Age: 63 y.o.    Chief Complaint   Patient presents with    Other     Left 3rd toe may be broken.        Patient is here today with complaints of left foot third toe being painful after striking a cabinet.  It is very bruised and swollen.  This occurred this morning.  It is quite tender.        Review of Systems   Constitutional:  Negative for activity change, appetite change, chills, diaphoresis, fatigue, fever and unexpected weight change.   HENT:  Negative for congestion, dental problem, drooling, ear discharge, ear pain, facial swelling, hearing loss, mouth sores, nosebleeds, postnasal drip, rhinorrhea, sinus pressure, sinus pain, sneezing, sore throat, tinnitus, trouble swallowing and voice change.    Eyes:  Negative for photophobia, pain, discharge, redness, itching and visual disturbance.   Respiratory:  Negative for apnea, cough, choking, chest tightness, shortness of breath, wheezing and stridor.    Cardiovascular:  Negative for chest pain, palpitations and leg swelling.   Gastrointestinal:  Negative for abdominal distention, abdominal pain, anal bleeding, blood in stool, constipation, diarrhea, nausea, rectal pain and vomiting.   Endocrine: Negative for cold intolerance, heat intolerance, polydipsia, polyphagia and polyuria.   Genitourinary:  Negative for decreased urine volume, difficulty urinating, dysuria, enuresis, flank pain, frequency, genital sores, hematuria and urgency.   Musculoskeletal:  Positive for arthralgias (left foot). Negative for back pain, gait problem, joint swelling, myalgias, neck pain and neck stiffness.   Skin:  Negative for color change, pallor, rash and wound.   Allergic/Immunologic: Negative for environmental allergies, food allergies and immunocompromised state.   Neurological:  Negative for dizziness, tremors, seizures, syncope, facial asymmetry, speech difficulty, weakness, light-headedness, numbness and headaches.

## 2024-11-26 LAB
ALBUMIN: 4.7 G/DL (ref 3.5–5.2)
ALP BLD-CCNC: 94 U/L (ref 40–129)
ALT SERPL-CCNC: 24 U/L (ref 0–40)
ANION GAP SERPL CALCULATED.3IONS-SCNC: 14 MMOL/L (ref 7–16)
AST SERPL-CCNC: 26 U/L (ref 0–39)
BILIRUB SERPL-MCNC: 0.2 MG/DL (ref 0–1.2)
BUN BLDV-MCNC: 7 MG/DL (ref 6–23)
CALCIUM SERPL-MCNC: 9.6 MG/DL (ref 8.6–10.2)
CHLORIDE BLD-SCNC: 102 MMOL/L (ref 98–107)
CHOLESTEROL, TOTAL: 162 MG/DL
CO2: 26 MMOL/L (ref 22–29)
CREAT SERPL-MCNC: 1.1 MG/DL (ref 0.7–1.2)
GFR, ESTIMATED: 78 ML/MIN/1.73M2
GLUCOSE BLD-MCNC: 91 MG/DL (ref 74–99)
HDLC SERPL-MCNC: 67 MG/DL
LDL CHOLESTEROL: 71 MG/DL
PHENOBARBITAL DATE LAST DOSE: NORMAL
PHENOBARBITAL DOSE AMOUNT: NORMAL
PHENOBARBITAL TIME LAST DOSE: NORMAL
PHENOBARBITAL: 19 UG/ML (ref 15–40)
POTASSIUM SERPL-SCNC: 4.5 MMOL/L (ref 3.5–5)
SODIUM BLD-SCNC: 142 MMOL/L (ref 132–146)
T3 FREE: 3.79 PG/ML (ref 2–4.4)
T4 FREE: 1.2 NG/DL (ref 0.9–1.7)
TOTAL PROTEIN: 7.5 G/DL (ref 6.4–8.3)
TRIGL SERPL-MCNC: 121 MG/DL
TSH SERPL DL<=0.05 MIU/L-ACNC: 4.52 UIU/ML (ref 0.27–4.2)
VITAMIN B-12: 993 PG/ML (ref 211–946)
VITAMIN D 25-HYDROXY: 60.6 NG/ML (ref 30–100)
VLDLC SERPL CALC-MCNC: 24 MG/DL

## 2024-11-27 LAB — KEPPRA: 21 UG/ML

## 2024-12-02 ENCOUNTER — OFFICE VISIT (OUTPATIENT)
Dept: FAMILY MEDICINE CLINIC | Age: 64
End: 2024-12-02

## 2024-12-02 VITALS
BODY MASS INDEX: 27.14 KG/M2 | DIASTOLIC BLOOD PRESSURE: 64 MMHG | OXYGEN SATURATION: 98 % | HEART RATE: 93 BPM | WEIGHT: 204.8 LBS | TEMPERATURE: 98.6 F | SYSTOLIC BLOOD PRESSURE: 110 MMHG | RESPIRATION RATE: 16 BRPM | HEIGHT: 73 IN

## 2024-12-02 DIAGNOSIS — G89.29 CHRONIC BILATERAL THORACIC BACK PAIN: ICD-10-CM

## 2024-12-02 DIAGNOSIS — J02.9 SORE THROAT: ICD-10-CM

## 2024-12-02 DIAGNOSIS — G40.909 SEIZURE DISORDER (HCC): ICD-10-CM

## 2024-12-02 DIAGNOSIS — M15.0 PRIMARY OSTEOARTHRITIS INVOLVING MULTIPLE JOINTS: ICD-10-CM

## 2024-12-02 DIAGNOSIS — G91.9 HYDROCEPHALUS, UNSPECIFIED TYPE (HCC): ICD-10-CM

## 2024-12-02 DIAGNOSIS — F33.0 MILD EPISODE OF RECURRENT MAJOR DEPRESSIVE DISORDER (HCC): ICD-10-CM

## 2024-12-02 DIAGNOSIS — M54.6 CHRONIC BILATERAL THORACIC BACK PAIN: ICD-10-CM

## 2024-12-02 DIAGNOSIS — E78.00 HYPERCHOLESTEROLEMIA: ICD-10-CM

## 2024-12-02 DIAGNOSIS — F31.9 BIPOLAR 1 DISORDER (HCC): ICD-10-CM

## 2024-12-02 DIAGNOSIS — K21.9 GASTROESOPHAGEAL REFLUX DISEASE WITHOUT ESOPHAGITIS: Primary | ICD-10-CM

## 2024-12-02 DIAGNOSIS — F02.80 DEMENTIA ASSOCIATED WITH OTHER UNDERLYING DISEASE WITHOUT BEHAVIORAL DISTURBANCE (HCC): ICD-10-CM

## 2024-12-02 LAB
INFLUENZA A ANTIGEN, POC: NORMAL
INFLUENZA B ANTIGEN, POC: NORMAL
Lab: NORMAL
PERFORMING INSTRUMENT: NORMAL
QC PASS/FAIL: NORMAL
S PYO AG THROAT QL: NORMAL
SARS-COV-2, POC: NORMAL

## 2024-12-02 RX ORDER — PANTOPRAZOLE SODIUM 40 MG/1
40 TABLET, DELAYED RELEASE ORAL
Qty: 60 TABLET | Refills: 5 | Status: SHIPPED | OUTPATIENT
Start: 2024-12-02

## 2024-12-02 ASSESSMENT — ENCOUNTER SYMPTOMS
CHOKING: 0
SINUS PAIN: 0
TROUBLE SWALLOWING: 0
EYE ITCHING: 0
ANAL BLEEDING: 0
BLOOD IN STOOL: 0
NAUSEA: 1
SINUS PRESSURE: 0
ABDOMINAL DISTENTION: 0
STRIDOR: 0
COUGH: 0
EYE DISCHARGE: 0
FACIAL SWELLING: 0
WHEEZING: 0
RECTAL PAIN: 0
CHEST TIGHTNESS: 0
APNEA: 0
SORE THROAT: 1
VOICE CHANGE: 0
CONSTIPATION: 0
PHOTOPHOBIA: 0
EYE REDNESS: 0
DIARRHEA: 0
COLOR CHANGE: 0
BACK PAIN: 1
SHORTNESS OF BREATH: 0
VOMITING: 0
EYE PAIN: 0
ABDOMINAL PAIN: 0
RHINORRHEA: 0

## 2024-12-02 NOTE — PROGRESS NOTES
Discussed the importance of drinking plenty of fluids.  [x] Cut down on Salt and Caffeine.  [x] Exercise 2-3 times weekly, if not more.  [] Cut down on Sugar and Fats.  [x] Continue Medications as Discussed.  [x] Communicated with patient any concerns, to phone office.  [x] Follow up as directed.       Return in about 3 months (around 3/2/2025).      Seen By:      RAKESH Wright - CNP

## 2024-12-09 DIAGNOSIS — G40.909 SEIZURE DISORDER (HCC): ICD-10-CM

## 2024-12-09 RX ORDER — CLONAZEPAM 0.5 MG/1
TABLET ORAL
Qty: 31 TABLET | Refills: 3 | Status: SHIPPED | OUTPATIENT
Start: 2024-12-09 | End: 2025-03-09

## 2024-12-09 NOTE — TELEPHONE ENCOUNTER
Name of Medication(s) Requested:  Requested Prescriptions     Pending Prescriptions Disp Refills    clonazePAM (KLONOPIN) 0.5 MG tablet [Pharmacy Med Name: CLONAZEPAM 0.5MG TABLET] 31 tablet 3     Si/2 TAB (0.25MG) BY MOUTH TWIC E A DAY (BI-POLAR D/O)       Medication is on current medication list Yes      Dosage and directions were verified? Yes    Quantity verified: 30 day supply     Pharmacy Verified?  Yes    Last Appointment:  2024    Future appts:  Future Appointments   Date Time Provider Department Center   3/10/2025  9:30 AM Meera Park APRN - CNP E. PAL [facility]   2025  9:00 AM Meera Park APRN - CNP E. [facility]        (If no appt send self scheduling link. .REFILLAPPT)  Scheduling request sent?     [] Yes  [x] No    Does patient need updated?  [] Yes  [x] No

## 2025-03-10 ENCOUNTER — OFFICE VISIT (OUTPATIENT)
Dept: FAMILY MEDICINE CLINIC | Age: 65
End: 2025-03-10
Payer: MEDICARE

## 2025-03-10 VITALS
DIASTOLIC BLOOD PRESSURE: 68 MMHG | OXYGEN SATURATION: 96 % | HEART RATE: 93 BPM | WEIGHT: 222.3 LBS | BODY MASS INDEX: 29.46 KG/M2 | RESPIRATION RATE: 16 BRPM | SYSTOLIC BLOOD PRESSURE: 92 MMHG | HEIGHT: 73 IN | TEMPERATURE: 98.6 F

## 2025-03-10 DIAGNOSIS — M15.0 PRIMARY OSTEOARTHRITIS INVOLVING MULTIPLE JOINTS: ICD-10-CM

## 2025-03-10 DIAGNOSIS — G89.29 CHRONIC BILATERAL THORACIC BACK PAIN: ICD-10-CM

## 2025-03-10 DIAGNOSIS — F02.80 DEMENTIA ASSOCIATED WITH OTHER UNDERLYING DISEASE WITHOUT BEHAVIORAL DISTURBANCE (HCC): ICD-10-CM

## 2025-03-10 DIAGNOSIS — G91.9 HYDROCEPHALUS, UNSPECIFIED TYPE (HCC): ICD-10-CM

## 2025-03-10 DIAGNOSIS — Z12.5 SCREENING FOR PROSTATE CANCER: ICD-10-CM

## 2025-03-10 DIAGNOSIS — G40.909 SEIZURE DISORDER (HCC): Primary | ICD-10-CM

## 2025-03-10 DIAGNOSIS — E78.00 HYPERCHOLESTEROLEMIA: ICD-10-CM

## 2025-03-10 DIAGNOSIS — M54.6 CHRONIC BILATERAL THORACIC BACK PAIN: ICD-10-CM

## 2025-03-10 DIAGNOSIS — F31.9 BIPOLAR 1 DISORDER (HCC): ICD-10-CM

## 2025-03-10 PROCEDURE — 3017F COLORECTAL CA SCREEN DOC REV: CPT | Performed by: NURSE PRACTITIONER

## 2025-03-10 PROCEDURE — 4004F PT TOBACCO SCREEN RCVD TLK: CPT | Performed by: NURSE PRACTITIONER

## 2025-03-10 PROCEDURE — 36415 COLL VENOUS BLD VENIPUNCTURE: CPT | Performed by: NURSE PRACTITIONER

## 2025-03-10 PROCEDURE — G8427 DOCREV CUR MEDS BY ELIG CLIN: HCPCS | Performed by: NURSE PRACTITIONER

## 2025-03-10 PROCEDURE — 99214 OFFICE O/P EST MOD 30 MIN: CPT | Performed by: NURSE PRACTITIONER

## 2025-03-10 PROCEDURE — G8417 CALC BMI ABV UP PARAM F/U: HCPCS | Performed by: NURSE PRACTITIONER

## 2025-03-10 SDOH — ECONOMIC STABILITY: INCOME INSECURITY: IN THE LAST 12 MONTHS, WAS THERE A TIME WHEN YOU WERE NOT ABLE TO PAY THE MORTGAGE OR RENT ON TIME?: NO

## 2025-03-10 SDOH — ECONOMIC STABILITY: FOOD INSECURITY: WITHIN THE PAST 12 MONTHS, THE FOOD YOU BOUGHT JUST DIDN'T LAST AND YOU DIDN'T HAVE MONEY TO GET MORE.: NEVER TRUE

## 2025-03-10 SDOH — ECONOMIC STABILITY: FOOD INSECURITY: WITHIN THE PAST 12 MONTHS, YOU WORRIED THAT YOUR FOOD WOULD RUN OUT BEFORE YOU GOT MONEY TO BUY MORE.: NEVER TRUE

## 2025-03-10 SDOH — ECONOMIC STABILITY: TRANSPORTATION INSECURITY
IN THE PAST 12 MONTHS, HAS THE LACK OF TRANSPORTATION KEPT YOU FROM MEDICAL APPOINTMENTS OR FROM GETTING MEDICATIONS?: NO

## 2025-03-10 SDOH — ECONOMIC STABILITY: TRANSPORTATION INSECURITY
IN THE PAST 12 MONTHS, HAS LACK OF TRANSPORTATION KEPT YOU FROM MEETINGS, WORK, OR FROM GETTING THINGS NEEDED FOR DAILY LIVING?: NO

## 2025-03-10 ASSESSMENT — PATIENT HEALTH QUESTIONNAIRE - PHQ9
8. MOVING OR SPEAKING SO SLOWLY THAT OTHER PEOPLE COULD HAVE NOTICED. OR THE OPPOSITE - BEING SO FIDGETY OR RESTLESS THAT YOU HAVE BEEN MOVING AROUND A LOT MORE THAN USUAL: NOT AT ALL
10. IF YOU CHECKED OFF ANY PROBLEMS, HOW DIFFICULT HAVE THESE PROBLEMS MADE IT FOR YOU TO DO YOUR WORK, TAKE CARE OF THINGS AT HOME, OR GET ALONG WITH OTHER PEOPLE: NOT DIFFICULT AT ALL
4. FEELING TIRED OR HAVING LITTLE ENERGY: SEVERAL DAYS
3. TROUBLE FALLING OR STAYING ASLEEP: NOT AT ALL
4. FEELING TIRED OR HAVING LITTLE ENERGY: SEVERAL DAYS
5. POOR APPETITE OR OVEREATING: NOT AT ALL
1. LITTLE INTEREST OR PLEASURE IN DOING THINGS: SEVERAL DAYS
6. FEELING BAD ABOUT YOURSELF - OR THAT YOU ARE A FAILURE OR HAVE LET YOURSELF OR YOUR FAMILY DOWN: NOT AT ALL
5. POOR APPETITE OR OVEREATING: NOT AT ALL
SUM OF ALL RESPONSES TO PHQ QUESTIONS 1-9: 3
9. THOUGHTS THAT YOU WOULD BE BETTER OFF DEAD, OR OF HURTING YOURSELF: NOT AT ALL
9. THOUGHTS THAT YOU WOULD BE BETTER OFF DEAD, OR OF HURTING YOURSELF: NOT AT ALL
SUM OF ALL RESPONSES TO PHQ QUESTIONS 1-9: 3
10. IF YOU CHECKED OFF ANY PROBLEMS, HOW DIFFICULT HAVE THESE PROBLEMS MADE IT FOR YOU TO DO YOUR WORK, TAKE CARE OF THINGS AT HOME, OR GET ALONG WITH OTHER PEOPLE: NOT DIFFICULT AT ALL
8. MOVING OR SPEAKING SO SLOWLY THAT OTHER PEOPLE COULD HAVE NOTICED. OR THE OPPOSITE, BEING SO FIGETY OR RESTLESS THAT YOU HAVE BEEN MOVING AROUND A LOT MORE THAN USUAL: NOT AT ALL
2. FEELING DOWN, DEPRESSED OR HOPELESS: SEVERAL DAYS
6. FEELING BAD ABOUT YOURSELF - OR THAT YOU ARE A FAILURE OR HAVE LET YOURSELF OR YOUR FAMILY DOWN: NOT AT ALL
SUM OF ALL RESPONSES TO PHQ QUESTIONS 1-9: 3
7. TROUBLE CONCENTRATING ON THINGS, SUCH AS READING THE NEWSPAPER OR WATCHING TELEVISION: NOT AT ALL
1. LITTLE INTEREST OR PLEASURE IN DOING THINGS: SEVERAL DAYS
2. FEELING DOWN, DEPRESSED OR HOPELESS: SEVERAL DAYS
3. TROUBLE FALLING OR STAYING ASLEEP: NOT AT ALL
SUM OF ALL RESPONSES TO PHQ QUESTIONS 1-9: 3
7. TROUBLE CONCENTRATING ON THINGS, SUCH AS READING THE NEWSPAPER OR WATCHING TELEVISION: NOT AT ALL
SUM OF ALL RESPONSES TO PHQ QUESTIONS 1-9: 3

## 2025-03-10 ASSESSMENT — ENCOUNTER SYMPTOMS
APNEA: 0
SINUS PAIN: 0
VOMITING: 0
DIARRHEA: 0
SORE THROAT: 0
ABDOMINAL DISTENTION: 0
WHEEZING: 0
FACIAL SWELLING: 0
SINUS PRESSURE: 0
SHORTNESS OF BREATH: 0
BACK PAIN: 0
ANAL BLEEDING: 0
COUGH: 0
EYE PAIN: 0
RECTAL PAIN: 0
BLOOD IN STOOL: 0
STRIDOR: 0
EYE ITCHING: 0
VOICE CHANGE: 0
CHEST TIGHTNESS: 0
CHOKING: 0
PHOTOPHOBIA: 0
NAUSEA: 0
TROUBLE SWALLOWING: 0
EYE REDNESS: 0
RHINORRHEA: 0
COLOR CHANGE: 0
CONSTIPATION: 0
ABDOMINAL PAIN: 0
EYE DISCHARGE: 0

## 2025-03-10 NOTE — PATIENT INSTRUCTIONS
medical appointments and shopping on a weekly schedule. 24-hour notice required. Cost: donation.  Phone: [phone]  Website: [email or web address]    [place] SENIOR TRANSPORTATION:  What they offer: No cost transportation for local seniors to medical appointments, shopping and social activities within [facility]. Call day of appointment.  Phone: [phone] option 1  [facility] TRANSIT AUTHORITY ([facility])  What they offer: [place] public transportation. Services available on weekdays and Saturdays. Hours and cost vary depending upon fixed route or other services. Fixed Route Service operates on fixed route and schedule with one route to [facility]. Special Service Transportation (SST) transports those 65+ or with disabilities. Goes ¾ mile outside [facility]'s fixed route for [facility]. Must be registered with program. 7-day notice required. [employer] provides services for areas in [facility] not served by fixed route or special service transportation.  Phone: [phone] or [phone]   Website: [email or web address].[email or web address]                         [facility] TRANSIT  What they offer:  Transportation for [facility] residents 60 years and older (copy of drivers license) and disabled (physician form and pt form)    Cost $2 each way for medical (can go 30 miles from residence) and non-medical (12 miles from residence)  Phone:  [phone]                                    [facility] HEALTHY AGING PROGRAM  What they offer: Transportation to medical appointments for [facility] residents for doctor appointments only; requires 2 business day notice      No cost   May be limited due to funding  Phone:  [phone] ([facility])                             INDIVIDUALS WITH MEDICAID MANAGED CARE PLANS ([employer], [place], [place] [employer], [employer], [employer], [employer], [place]

## 2025-03-10 NOTE — PROGRESS NOTES
distress.     Appearance: Normal appearance. He is normal weight. He is not ill-appearing, toxic-appearing or diaphoretic.   HENT:      Head: Normocephalic and atraumatic.      Right Ear: Tympanic membrane, ear canal and external ear normal. There is no impacted cerumen.      Left Ear: Tympanic membrane, ear canal and external ear normal. There is no impacted cerumen.      Nose: Nose normal. No congestion or rhinorrhea.      Mouth/Throat:      Mouth: Mucous membranes are moist.      Pharynx: Oropharynx is clear. No oropharyngeal exudate or posterior oropharyngeal erythema.   Eyes:      General: No scleral icterus.        Right eye: No discharge.         Left eye: No discharge.      Extraocular Movements: Extraocular movements intact.      Conjunctiva/sclera: Conjunctivae normal.      Pupils: Pupils are equal, round, and reactive to light.   Neck:      Vascular: No carotid bruit.   Cardiovascular:      Rate and Rhythm: Normal rate and regular rhythm.      Pulses: Normal pulses.      Heart sounds: Normal heart sounds. No murmur heard.     No friction rub. No gallop.   Pulmonary:      Effort: Pulmonary effort is normal. No respiratory distress.      Breath sounds: No stridor. No wheezing, rhonchi or rales.   Chest:      Chest wall: No tenderness.   Abdominal:      General: Abdomen is flat. Bowel sounds are normal. There is no distension.      Palpations: Abdomen is soft. There is no mass.      Tenderness: There is no abdominal tenderness. There is no right CVA tenderness, left CVA tenderness, guarding or rebound.      Hernia: No hernia is present.   Musculoskeletal:         General: Tenderness (left foot 2nd toe) present. No swelling, deformity or signs of injury. Normal range of motion.      Cervical back: Normal range of motion and neck supple. No rigidity. No muscular tenderness.      Right lower leg: No edema.      Left lower leg: No edema.   Lymphadenopathy:      Cervical: No cervical adenopathy.   Skin:

## 2025-05-07 ENCOUNTER — TELEPHONE (OUTPATIENT)
Dept: FAMILY MEDICINE CLINIC | Age: 65
End: 2025-05-07

## 2025-05-07 RX ORDER — CARIPRAZINE 4.5 MG/1
4.5 CAPSULE, GELATIN COATED ORAL DAILY
COMMUNITY
Start: 2025-04-22

## 2025-05-07 RX ORDER — RIVASTIGMINE TARTRATE 6 MG/1
6 CAPSULE ORAL 2 TIMES DAILY
COMMUNITY
Start: 2025-04-22

## 2025-05-23 DIAGNOSIS — G40.909 SEIZURE DISORDER (HCC): ICD-10-CM

## 2025-05-23 RX ORDER — CLONAZEPAM 0.5 MG/1
TABLET ORAL
Qty: 30 TABLET | Refills: 0 | Status: SHIPPED | OUTPATIENT
Start: 2025-05-23 | End: 2025-06-23

## 2025-05-23 NOTE — TELEPHONE ENCOUNTER
Name of Medication(s) Requested:  Requested Prescriptions     Pending Prescriptions Disp Refills    clonazePAM (KLONOPIN) 0.5 MG tablet [Pharmacy Med Name: CLONAZEPAM 0.5MG TABLET] 30 tablet 0     Si/2 TAB (0.25MG) BY MOUTH TWIC E A DAY (BI-POLAR D/O)       Medication is on current medication list Yes    Dosage and directions were verified? Yes    Quantity verified: 30 day supply     Pharmacy Verified?  Yes    Last Appointment:  3/10/2025    Future appts:  Future Appointments   Date Time Provider Department Center   2025  9:00 AM Meera Park APRN - CNP E. PAL [facility]   2025 10:30 AM Meera Park APRN - CNP E. PAL [facility]        (If no appt send self scheduling link. .REFILLAPPT)  Scheduling request sent?     [] Yes  [] No    Does patient need updated?  [] Yes  [] No

## 2025-06-06 ENCOUNTER — LAB (OUTPATIENT)
Dept: FAMILY MEDICINE CLINIC | Age: 65
End: 2025-06-06
Payer: MEDICARE

## 2025-06-06 DIAGNOSIS — G91.9 HYDROCEPHALUS, UNSPECIFIED TYPE (HCC): ICD-10-CM

## 2025-06-06 DIAGNOSIS — G89.29 CHRONIC BILATERAL THORACIC BACK PAIN: ICD-10-CM

## 2025-06-06 DIAGNOSIS — F02.80 DEMENTIA ASSOCIATED WITH OTHER UNDERLYING DISEASE WITHOUT BEHAVIORAL DISTURBANCE (HCC): ICD-10-CM

## 2025-06-06 DIAGNOSIS — G40.909 SEIZURE DISORDER (HCC): ICD-10-CM

## 2025-06-06 DIAGNOSIS — F31.9 BIPOLAR 1 DISORDER (HCC): ICD-10-CM

## 2025-06-06 DIAGNOSIS — E78.00 HYPERCHOLESTEROLEMIA: ICD-10-CM

## 2025-06-06 DIAGNOSIS — M15.0 PRIMARY OSTEOARTHRITIS INVOLVING MULTIPLE JOINTS: ICD-10-CM

## 2025-06-06 DIAGNOSIS — Z12.5 SCREENING FOR PROSTATE CANCER: ICD-10-CM

## 2025-06-06 DIAGNOSIS — M54.6 CHRONIC BILATERAL THORACIC BACK PAIN: ICD-10-CM

## 2025-06-06 LAB
ALBUMIN: 4.4 G/DL (ref 3.5–5.2)
ALP BLD-CCNC: 74 U/L (ref 40–129)
ALT SERPL-CCNC: 23 U/L (ref 0–50)
ANION GAP SERPL CALCULATED.3IONS-SCNC: 15 MMOL/L (ref 7–16)
AST SERPL-CCNC: 28 U/L (ref 0–50)
BASOPHILS ABSOLUTE: 0.04 K/UL (ref 0–0.2)
BASOPHILS RELATIVE PERCENT: 1 % (ref 0–2)
BILIRUB SERPL-MCNC: 0.3 MG/DL (ref 0–1.2)
BUN BLDV-MCNC: 9 MG/DL (ref 8–23)
CALCIUM SERPL-MCNC: 9.5 MG/DL (ref 8.8–10.2)
CHLORIDE BLD-SCNC: 102 MMOL/L (ref 98–107)
CHOLESTEROL, TOTAL: 168 MG/DL
CO2: 23 MMOL/L (ref 22–29)
CREAT SERPL-MCNC: 1 MG/DL (ref 0.7–1.2)
EOSINOPHILS ABSOLUTE: 0.31 K/UL (ref 0.05–0.5)
EOSINOPHILS RELATIVE PERCENT: 5 % (ref 0–6)
GFR, ESTIMATED: 86 ML/MIN/1.73M2
GLUCOSE BLD-MCNC: 105 MG/DL (ref 74–99)
HCT VFR BLD CALC: 44.5 % (ref 37–54)
HDLC SERPL-MCNC: 64 MG/DL
HEMOGLOBIN: 14.7 G/DL (ref 12.5–16.5)
IMMATURE GRANULOCYTES %: 0 % (ref 0–5)
IMMATURE GRANULOCYTES ABSOLUTE: <0.03 K/UL (ref 0–0.58)
LDL CHOLESTEROL: 82 MG/DL
LYMPHOCYTES ABSOLUTE: 2.46 K/UL (ref 1.5–4)
LYMPHOCYTES RELATIVE PERCENT: 36 % (ref 20–42)
MCH RBC QN AUTO: 31.8 PG (ref 26–35)
MCHC RBC AUTO-ENTMCNC: 33 G/DL (ref 32–34.5)
MCV RBC AUTO: 96.3 FL (ref 80–99.9)
MONOCYTES ABSOLUTE: 0.63 K/UL (ref 0.1–0.95)
MONOCYTES RELATIVE PERCENT: 9 % (ref 2–12)
NEUTROPHILS ABSOLUTE: 3.4 K/UL (ref 1.8–7.3)
NEUTROPHILS RELATIVE PERCENT: 50 % (ref 43–80)
PDW BLD-RTO: 12.2 % (ref 11.5–15)
PHENOBARBITAL DATE LAST DOSE: NORMAL
PHENOBARBITAL DOSE AMOUNT: NORMAL
PHENOBARBITAL TIME LAST DOSE: NORMAL
PHENOBARBITAL: 20.6 UG/ML (ref 10–30)
PLATELET # BLD: 231 K/UL (ref 130–450)
PMV BLD AUTO: 12 FL (ref 7–12)
POTASSIUM SERPL-SCNC: 4.1 MMOL/L (ref 3.5–5.1)
PROSTATE SPECIFIC ANTIGEN: 0.28 NG/ML (ref 0–4)
RBC # BLD: 4.62 M/UL (ref 3.8–5.8)
SODIUM BLD-SCNC: 139 MMOL/L (ref 136–145)
T4 FREE: 1.2 NG/DL (ref 0.9–1.7)
TOTAL PROTEIN: 7.1 G/DL (ref 6.4–8.3)
TRIGL SERPL-MCNC: 109 MG/DL
TSH SERPL DL<=0.05 MIU/L-ACNC: 5.13 UIU/ML (ref 0.27–4.2)
VLDLC SERPL CALC-MCNC: 22 MG/DL
WBC # BLD: 6.9 K/UL (ref 4.5–11.5)

## 2025-06-06 PROCEDURE — 36415 COLL VENOUS BLD VENIPUNCTURE: CPT | Performed by: NURSE PRACTITIONER

## 2025-06-08 LAB — KEPPRA: 21 UG/ML

## 2025-06-09 ENCOUNTER — RESULTS FOLLOW-UP (OUTPATIENT)
Dept: FAMILY MEDICINE CLINIC | Age: 65
End: 2025-06-09

## 2025-06-09 ENCOUNTER — OFFICE VISIT (OUTPATIENT)
Dept: FAMILY MEDICINE CLINIC | Age: 65
End: 2025-06-09
Payer: MEDICARE

## 2025-06-09 VITALS
HEART RATE: 60 BPM | OXYGEN SATURATION: 98 % | HEIGHT: 73 IN | SYSTOLIC BLOOD PRESSURE: 110 MMHG | WEIGHT: 215.5 LBS | TEMPERATURE: 97.5 F | BODY MASS INDEX: 28.56 KG/M2 | RESPIRATION RATE: 16 BRPM | DIASTOLIC BLOOD PRESSURE: 74 MMHG

## 2025-06-09 VITALS
RESPIRATION RATE: 16 BRPM | TEMPERATURE: 97.5 F | WEIGHT: 215.5 LBS | HEIGHT: 73 IN | DIASTOLIC BLOOD PRESSURE: 74 MMHG | OXYGEN SATURATION: 98 % | SYSTOLIC BLOOD PRESSURE: 110 MMHG | HEART RATE: 60 BPM | BODY MASS INDEX: 28.56 KG/M2

## 2025-06-09 DIAGNOSIS — F31.9 BIPOLAR 1 DISORDER (HCC): ICD-10-CM

## 2025-06-09 DIAGNOSIS — E78.00 HYPERCHOLESTEROLEMIA: ICD-10-CM

## 2025-06-09 DIAGNOSIS — G40.909 SEIZURE DISORDER (HCC): ICD-10-CM

## 2025-06-09 DIAGNOSIS — M54.6 CHRONIC BILATERAL THORACIC BACK PAIN: ICD-10-CM

## 2025-06-09 DIAGNOSIS — K21.9 GASTROESOPHAGEAL REFLUX DISEASE WITHOUT ESOPHAGITIS: ICD-10-CM

## 2025-06-09 DIAGNOSIS — E03.9 HYPOTHYROIDISM, UNSPECIFIED TYPE: Primary | ICD-10-CM

## 2025-06-09 DIAGNOSIS — F02.80 DEMENTIA ASSOCIATED WITH OTHER UNDERLYING DISEASE WITHOUT BEHAVIORAL DISTURBANCE (HCC): ICD-10-CM

## 2025-06-09 DIAGNOSIS — G89.29 CHRONIC BILATERAL THORACIC BACK PAIN: ICD-10-CM

## 2025-06-09 DIAGNOSIS — Z00.00 MEDICARE ANNUAL WELLNESS VISIT, SUBSEQUENT: Primary | ICD-10-CM

## 2025-06-09 DIAGNOSIS — M15.0 PRIMARY OSTEOARTHRITIS INVOLVING MULTIPLE JOINTS: ICD-10-CM

## 2025-06-09 DIAGNOSIS — G91.9 HYDROCEPHALUS, UNSPECIFIED TYPE (HCC): ICD-10-CM

## 2025-06-09 PROCEDURE — 99214 OFFICE O/P EST MOD 30 MIN: CPT | Performed by: NURSE PRACTITIONER

## 2025-06-09 PROCEDURE — G8417 CALC BMI ABV UP PARAM F/U: HCPCS | Performed by: NURSE PRACTITIONER

## 2025-06-09 PROCEDURE — 4004F PT TOBACCO SCREEN RCVD TLK: CPT | Performed by: NURSE PRACTITIONER

## 2025-06-09 PROCEDURE — G0439 PPPS, SUBSEQ VISIT: HCPCS | Performed by: NURSE PRACTITIONER

## 2025-06-09 PROCEDURE — G8427 DOCREV CUR MEDS BY ELIG CLIN: HCPCS | Performed by: NURSE PRACTITIONER

## 2025-06-09 RX ORDER — LEVOTHYROXINE SODIUM 25 UG/1
25 TABLET ORAL DAILY
Qty: 30 TABLET | Refills: 5 | Status: SHIPPED | OUTPATIENT
Start: 2025-06-09

## 2025-06-09 SDOH — HEALTH STABILITY: PHYSICAL HEALTH: ON AVERAGE, HOW MANY MINUTES DO YOU ENGAGE IN EXERCISE AT THIS LEVEL?: 0 MIN

## 2025-06-09 SDOH — HEALTH STABILITY: PHYSICAL HEALTH: ON AVERAGE, HOW MANY DAYS PER WEEK DO YOU ENGAGE IN MODERATE TO STRENUOUS EXERCISE (LIKE A BRISK WALK)?: 0 DAYS

## 2025-06-09 ASSESSMENT — ENCOUNTER SYMPTOMS
CHOKING: 0
SINUS PAIN: 0
NAUSEA: 0
ANAL BLEEDING: 0
STRIDOR: 0
SHORTNESS OF BREATH: 0
RHINORRHEA: 0
TROUBLE SWALLOWING: 0
COLOR CHANGE: 0
DIARRHEA: 0
ABDOMINAL DISTENTION: 0
PHOTOPHOBIA: 0
BACK PAIN: 0
VOMITING: 0
ABDOMINAL PAIN: 0
BLOOD IN STOOL: 0
VOICE CHANGE: 0
CONSTIPATION: 0
EYE DISCHARGE: 0
SORE THROAT: 0
COUGH: 0
EYE ITCHING: 0
CHEST TIGHTNESS: 0
EYE PAIN: 0
APNEA: 0
FACIAL SWELLING: 0
EYE REDNESS: 0
RECTAL PAIN: 0
SINUS PRESSURE: 0
WHEEZING: 0

## 2025-06-09 ASSESSMENT — LIFESTYLE VARIABLES
HOW MANY STANDARD DRINKS CONTAINING ALCOHOL DO YOU HAVE ON A TYPICAL DAY: PATIENT DOES NOT DRINK
HOW MANY STANDARD DRINKS CONTAINING ALCOHOL DO YOU HAVE ON A TYPICAL DAY: PATIENT DOES NOT DRINK
HOW OFTEN DO YOU HAVE SIX OR MORE DRINKS ON ONE OCCASION: 1
HOW OFTEN DO YOU HAVE A DRINK CONTAINING ALCOHOL: NEVER
HOW MANY STANDARD DRINKS CONTAINING ALCOHOL DO YOU HAVE ON A TYPICAL DAY: 0
HOW OFTEN DO YOU HAVE A DRINK CONTAINING ALCOHOL: 1
HOW OFTEN DO YOU HAVE A DRINK CONTAINING ALCOHOL: NEVER

## 2025-06-09 ASSESSMENT — PATIENT HEALTH QUESTIONNAIRE - PHQ9
SUM OF ALL RESPONSES TO PHQ QUESTIONS 1-9: 2
1. LITTLE INTEREST OR PLEASURE IN DOING THINGS: SEVERAL DAYS
2. FEELING DOWN, DEPRESSED OR HOPELESS: SEVERAL DAYS

## 2025-06-09 NOTE — PROGRESS NOTES
MULTIVITAMIN-MINERALS) tablet Take 1 tablet by mouth daily Yes Provider, MD Flavio   cyanocobalamin 1000 MCG/ML injection Inject 1 mL into the muscle every 30 days Yes ProviderFlavio MD   vitamin D (ERGOCALCIFEROL) 66213 units CAPS capsule Take 1 capsule by mouth once a week Yes ProviderFlavio MD   levothyroxine (SYNTHROID) 25 MCG tablet Take 1 tablet by mouth daily  Meera Park APRN - CNP       CareTeam (Including outside providers/suppliers regularly involved in providing care):   Patient Care Team:  Meera Park APRN - CNP as PCP - General (Family Medicine)  Meera Park APRN - CNP as PCP - Empaneled Provider     Recommendations for Preventive Services Due: see orders and patient instructions/AVS.  Recommended screening schedule for the next 5-10 years is provided to the patient in written form: see Patient Instructions/AVS.     Reviewed and updated this visit:  Allergies  Meds  Sexual Hx

## 2025-06-09 NOTE — PROGRESS NOTES
of Levetiracetam (Keppra) can be elevated due to the presence of both Keppra and   Brivaracetam (Briviact) in the patient's system.  The medications are structurally similar thus cross reactivity is possible.  Pharmacokinetics of Keppra are affected by renal function.  The relationship between serum concentrations and toxicity is not known.      PSA 06/06/2025 0.28  0.00 - 4.00 ng/mL Final    Comment: The Roche \"ECLIA\" assay is used.  Results obtained with different assay methods cannot be   used interchangeably.        Assessment and Plan:  Chandra \"Junior\" was seen today for follow-up.    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  [] Advised to monitor eating habits.  [] Reviewed and discussed Imaging results.  [x] Reviewed and discussed Lab results.  [] Discussed the importance of drinking plenty of fluids.  [] Cut down on Salt and Caffeine.  [] Exercise 2-3 times weekly, if not more.  [] Cut down on Sugar and Fats.  [x] Continue Medications as Discussed.  [x] Communicated with patient any concerns, to phone office.  [x] Follow up as directed.       Return in about 3 months (around 9/9/2025).      Seen By:      RAKESH Wright - CNP

## 2025-06-09 NOTE — PATIENT INSTRUCTIONS
Advance Directives: Care Instructions  Overview  An advance directive is a legal way to state your wishes at the end of your life. It tells your loved ones and doctor what to do if you can't say what you want.  There are two main types of advance directives. You can change them any time your wishes change.  Living will. This form tells your loved ones and doctor your wishes about life support and other treatment. The form is also called a declaration.  Medical power of . This form lets you name a person to make treatment decisions for you when you can't speak for yourself. This person is called a health care agent (health care proxy, health care surrogate). The form is also called a durable power of  for health care.  If you do not have an advance directive, decisions about your medical care may be made by a family member or doctor who doesn't know you or by a .  It may help to think of an advance directive as a gift to the people who care for you. If you have one, they won't have to make tough decisions by themselves.  For more information, including forms for your state, see the [employer] website (www.caringinfo.org/planning/advance-directives/).  Follow-up care is a key part of your treatment and safety. Be sure to make and go to all appointments, and call your doctor if you are having problems. It's also a good idea to know your test results and keep a list of the medicines you take.  What should you include in an advance directive?  Many states have a unique advance directive form. (It may ask you to address specific issues.) Or you might use a universal form that's approved by many states.  If your form doesn't tell you what to address, it may be hard to know what to include in your advance directive. Use the questions below to help you get started.  Who do you want to make decisions about your medical care if you are not able to?  What life-support measures do you want if you have a

## 2025-07-22 DIAGNOSIS — G40.909 SEIZURE DISORDER (HCC): ICD-10-CM

## 2025-07-22 RX ORDER — CLONAZEPAM 0.5 MG/1
TABLET ORAL
Qty: 31 TABLET | Refills: 4 | Status: SHIPPED | OUTPATIENT
Start: 2025-07-22 | End: 2025-11-22

## 2025-09-03 ENCOUNTER — LAB (OUTPATIENT)
Dept: FAMILY MEDICINE CLINIC | Age: 65
End: 2025-09-03
Payer: MEDICARE

## 2025-09-03 DIAGNOSIS — E03.9 HYPOTHYROIDISM, UNSPECIFIED TYPE: ICD-10-CM

## 2025-09-03 LAB — TSH SERPL DL<=0.05 MIU/L-ACNC: 3.81 UIU/ML (ref 0.27–4.2)

## 2025-09-03 PROCEDURE — 36415 COLL VENOUS BLD VENIPUNCTURE: CPT | Performed by: NURSE PRACTITIONER
